# Patient Record
Sex: MALE | Race: WHITE | NOT HISPANIC OR LATINO | Employment: OTHER | URBAN - METROPOLITAN AREA
[De-identification: names, ages, dates, MRNs, and addresses within clinical notes are randomized per-mention and may not be internally consistent; named-entity substitution may affect disease eponyms.]

---

## 2018-01-25 ENCOUNTER — TELEPHONE (OUTPATIENT)
Dept: FAMILY MEDICINE CLINIC | Facility: CLINIC | Age: 48
End: 2018-01-25

## 2018-01-25 DIAGNOSIS — Z20.828 EXPOSURE TO THE FLU: Primary | ICD-10-CM

## 2018-01-25 RX ORDER — OSELTAMIVIR PHOSPHATE 75 MG/1
75 CAPSULE ORAL DAILY
Qty: 10 CAPSULE | Refills: 0 | Status: SHIPPED | OUTPATIENT
Start: 2018-01-25 | End: 2018-10-03

## 2018-01-25 NOTE — TELEPHONE ENCOUNTER
Patient is with wife today, she is positive for influenza. He is requesting Tamiflu for prevention. KRYSTIN

## 2018-10-03 ENCOUNTER — HOSPITAL ENCOUNTER (OUTPATIENT)
Dept: GENERAL RADIOLOGY | Facility: HOSPITAL | Age: 48
Discharge: HOME | End: 2018-10-03
Admitting: FAMILY MEDICINE

## 2018-10-03 ENCOUNTER — OFFICE VISIT (OUTPATIENT)
Dept: FAMILY MEDICINE CLINIC | Facility: CLINIC | Age: 48
End: 2018-10-03

## 2018-10-03 VITALS
SYSTOLIC BLOOD PRESSURE: 140 MMHG | HEART RATE: 80 BPM | DIASTOLIC BLOOD PRESSURE: 72 MMHG | TEMPERATURE: 97.7 F | WEIGHT: 315 LBS | BODY MASS INDEX: 52.48 KG/M2 | HEIGHT: 65 IN | RESPIRATION RATE: 18 BRPM

## 2018-10-03 DIAGNOSIS — R55 NEAR SYNCOPE: ICD-10-CM

## 2018-10-03 DIAGNOSIS — Z00.00 WELL ADULT EXAM: Primary | ICD-10-CM

## 2018-10-03 DIAGNOSIS — H81.13 BENIGN PAROXYSMAL POSITIONAL VERTIGO DUE TO BILATERAL VESTIBULAR DISORDER: ICD-10-CM

## 2018-10-03 DIAGNOSIS — Z12.11 SCREEN FOR COLON CANCER: ICD-10-CM

## 2018-10-03 DIAGNOSIS — R06.02 SHORTNESS OF BREATH: ICD-10-CM

## 2018-10-03 DIAGNOSIS — G47.33 OSA (OBSTRUCTIVE SLEEP APNEA): ICD-10-CM

## 2018-10-03 DIAGNOSIS — R07.89 CHEST PAIN, ATYPICAL: ICD-10-CM

## 2018-10-03 PROBLEM — M54.50 LBP (LOW BACK PAIN): Status: ACTIVE | Noted: 2018-10-03

## 2018-10-03 PROBLEM — E66.9 ADIPOSITY: Status: ACTIVE | Noted: 2018-10-03

## 2018-10-03 PROCEDURE — 99214 OFFICE O/P EST MOD 30 MIN: CPT | Performed by: FAMILY MEDICINE

## 2018-10-03 PROCEDURE — 71046 X-RAY EXAM CHEST 2 VIEWS: CPT

## 2018-10-03 RX ORDER — HYDROCODONE BITARTRATE AND ACETAMINOPHEN 10; 325 MG/1; MG/1
TABLET ORAL
Refills: 0 | COMMUNITY
Start: 2018-09-17 | End: 2021-09-21

## 2018-10-03 RX ORDER — TRAMADOL HYDROCHLORIDE 50 MG/1
50 TABLET ORAL 2 TIMES DAILY
Refills: 1 | COMMUNITY
Start: 2018-08-18 | End: 2021-09-21

## 2018-10-03 NOTE — PROGRESS NOTES
"Subjective   Matt Bueno is a 47 y.o. male.     History of Present Illness     Matt Bueno 47 y.o. male who presents for yearly preventive exam.  His overall health is: good  He exercises none.  He does see his dentist regularly  His diet is in general, an \"unhealthy\" diet, but they are going to try to make changes  He describes his alcohol intake as very rare  He is sexually active  He does not have ED or other bedroom issues      Mom had colon cancer then she was 58 and passed away at age 64  He has not had a colonsocopy    He noticed since he had a cold about a month ago that he has had increase work of breathing  Can happen at rest and with exertion  Sometimes when cough it actually makes it easier to breath  No congestion, no CP    He had an epsiode one night about a month ago  He woke up, sweaty and SOA  Felt like he was going to pass out  He has been dizzy with change in position since that time  More dizzy with change of head position    The following portions of the patient's history were reviewed and updated as appropriate: allergies, current medications, past family history, past medical history, past social history, past surgical history and problem list.    Review of Systems   Constitutional: Positive for fatigue. Negative for fever.   HENT: Positive for congestion and rhinorrhea. Negative for sinus pressure and sore throat.    Eyes: Negative.    Respiratory: Positive for shortness of breath.    Cardiovascular: Positive for chest pain.   Gastrointestinal: Negative.  Negative for constipation and diarrhea.   Endocrine: Negative.  Negative for polydipsia and polyuria.   Genitourinary: Negative.    Musculoskeletal: Negative.    Skin: Negative.    Neurological: Positive for dizziness.   Hematological: Negative.  Does not bruise/bleed easily.   Psychiatric/Behavioral: Negative.    All other systems reviewed and are negative.      Objective   Physical Exam   Constitutional: He is oriented to " person, place, and time. He appears well-developed and well-nourished. No distress.   Morbidly obese, pleasant 47 YOM, appears stated age   HENT:   Head: Normocephalic and atraumatic.   Right Ear: Tympanic membrane, external ear and ear canal normal.   Left Ear: Tympanic membrane, external ear and ear canal normal.   Nose: Nose normal.   Mouth/Throat: Uvula is midline and oropharynx is clear and moist.   Eyes: Conjunctivae and EOM are normal.   Neck: Normal range of motion. Neck supple. No thyromegaly present.   Cardiovascular: Normal rate, regular rhythm and normal heart sounds.    No murmur heard.  Pulmonary/Chest: Effort normal and breath sounds normal. No respiratory distress.   Abdominal: Soft. Bowel sounds are normal. He exhibits no distension and no mass. There is no tenderness.   Musculoskeletal: Normal range of motion. He exhibits edema (trace BLE edema).   Lymphadenopathy:     He has no cervical adenopathy.   Neurological: He is alert and oriented to person, place, and time.   Skin: Skin is warm and dry.   Psychiatric: He has a normal mood and affect. His behavior is normal. Judgment and thought content normal.   Nursing note and vitals reviewed.      Assessment/Plan   Matt was seen today for establish care.    Diagnoses and all orders for this visit:    Well adult exam    Screen for colon cancer  -     Ambulatory Referral For Screening Colonoscopy    POOJA (obstructive sleep apnea)  -     Home Sleep Study; Future    Benign paroxysmal positional vertigo due to bilateral vestibular disorder    Shortness of breath  -     XR Chest PA & Lateral    Chest pain, atypical  -     Treadmill Stress Test; Future  -     CBC & Differential  -     Comprehensive Metabolic Panel  -     Lipid Panel    Near syncope      Counseled regarding well adult issues and encouraged diet and exercise for weight loss.  Will also check colonoscopy as his mother had colon cancer.  Will check routine labs as well    wioll check home sleep  study for POOJA that he is no longer using CPAP for  CXR for SOA, stress test for chest pain, consider PFTs is everything else checks out.  Home epley maneuvers for dizziness.

## 2018-10-04 LAB
ALBUMIN SERPL-MCNC: 4.26 G/DL (ref 3.2–4.8)
ALBUMIN/GLOB SERPL: 1.9 G/DL (ref 1.5–2.5)
ALP SERPL-CCNC: 57 U/L (ref 25–100)
ALT SERPL-CCNC: 11 U/L (ref 7–40)
AST SERPL-CCNC: 12 U/L (ref 0–33)
BASOPHILS # BLD AUTO: 0.03 10*3/MM3 (ref 0–0.2)
BASOPHILS NFR BLD AUTO: 0.4 % (ref 0–1)
BILIRUB SERPL-MCNC: 0.6 MG/DL (ref 0.3–1.2)
BUN SERPL-MCNC: 14 MG/DL (ref 9–23)
BUN/CREAT SERPL: 18.7 (ref 7–25)
CALCIUM SERPL-MCNC: 9.1 MG/DL (ref 8.7–10.4)
CHLORIDE SERPL-SCNC: 102 MMOL/L (ref 99–109)
CHOLEST SERPL-MCNC: 166 MG/DL (ref 0–200)
CO2 SERPL-SCNC: 26 MMOL/L (ref 20–31)
CREAT SERPL-MCNC: 0.75 MG/DL (ref 0.6–1.3)
EOSINOPHIL # BLD AUTO: 0.31 10*3/MM3 (ref 0–0.3)
EOSINOPHIL NFR BLD AUTO: 3.9 % (ref 0–3)
ERYTHROCYTE [DISTWIDTH] IN BLOOD BY AUTOMATED COUNT: 14.1 % (ref 11.3–14.5)
GLOBULIN SER CALC-MCNC: 2.2 GM/DL
GLUCOSE SERPL-MCNC: 87 MG/DL (ref 70–100)
HCT VFR BLD AUTO: 38.8 % (ref 38.9–50.9)
HDLC SERPL-MCNC: 47 MG/DL (ref 40–60)
HGB BLD-MCNC: 12 G/DL (ref 13.1–17.5)
IMM GRANULOCYTES # BLD: 0.02 10*3/MM3 (ref 0–0.03)
IMM GRANULOCYTES NFR BLD: 0.3 % (ref 0–0.6)
LDLC SERPL CALC-MCNC: 99 MG/DL (ref 0–100)
LYMPHOCYTES # BLD AUTO: 1.99 10*3/MM3 (ref 0.6–4.8)
LYMPHOCYTES NFR BLD AUTO: 24.9 % (ref 24–44)
MCH RBC QN AUTO: 25.6 PG (ref 27–31)
MCHC RBC AUTO-ENTMCNC: 30.9 G/DL (ref 32–36)
MCV RBC AUTO: 82.9 FL (ref 80–99)
MONOCYTES # BLD AUTO: 0.63 10*3/MM3 (ref 0–1)
MONOCYTES NFR BLD AUTO: 7.9 % (ref 0–12)
NEUTROPHILS # BLD AUTO: 5.01 10*3/MM3 (ref 1.5–8.3)
NEUTROPHILS NFR BLD AUTO: 62.6 % (ref 41–71)
PLATELET # BLD AUTO: 307 10*3/MM3 (ref 150–450)
POTASSIUM SERPL-SCNC: 4.4 MMOL/L (ref 3.5–5.5)
PROT SERPL-MCNC: 6.5 G/DL (ref 5.7–8.2)
RBC # BLD AUTO: 4.68 10*6/MM3 (ref 4.2–5.76)
SODIUM SERPL-SCNC: 139 MMOL/L (ref 132–146)
TRIGL SERPL-MCNC: 101 MG/DL (ref 0–150)
VLDLC SERPL CALC-MCNC: 20.2 MG/DL
WBC # BLD AUTO: 7.99 10*3/MM3 (ref 3.5–10.8)

## 2018-10-05 ENCOUNTER — PREP FOR SURGERY (OUTPATIENT)
Dept: OTHER | Facility: HOSPITAL | Age: 48
End: 2018-10-05

## 2018-10-05 DIAGNOSIS — Z12.11 SCREEN FOR COLON CANCER: Primary | ICD-10-CM

## 2018-10-05 DIAGNOSIS — I51.7 CARDIOMEGALY: Primary | ICD-10-CM

## 2018-10-19 PROBLEM — Z12.11 SCREEN FOR COLON CANCER: Status: ACTIVE | Noted: 2018-10-19

## 2018-10-31 ENCOUNTER — HOSPITAL ENCOUNTER (OUTPATIENT)
Dept: CARDIOLOGY | Facility: HOSPITAL | Age: 48
Discharge: HOME | End: 2018-10-31
Admitting: FAMILY MEDICINE

## 2018-10-31 ENCOUNTER — TELEPHONE (OUTPATIENT)
Dept: FAMILY MEDICINE CLINIC | Facility: CLINIC | Age: 48
End: 2018-10-31

## 2018-10-31 ENCOUNTER — OFFICE VISIT (OUTPATIENT)
Dept: FAMILY MEDICINE CLINIC | Facility: CLINIC | Age: 48
End: 2018-10-31

## 2018-10-31 VITALS — WEIGHT: 315 LBS | BODY MASS INDEX: 52.48 KG/M2 | HEIGHT: 65 IN

## 2018-10-31 VITALS
WEIGHT: 315 LBS | SYSTOLIC BLOOD PRESSURE: 148 MMHG | TEMPERATURE: 98.6 F | DIASTOLIC BLOOD PRESSURE: 76 MMHG | HEIGHT: 65 IN | RESPIRATION RATE: 24 BRPM | HEART RATE: 98 BPM | BODY MASS INDEX: 52.48 KG/M2 | OXYGEN SATURATION: 97 %

## 2018-10-31 DIAGNOSIS — E66.01 MORBIDLY OBESE (HCC): ICD-10-CM

## 2018-10-31 DIAGNOSIS — R07.89 OTHER CHEST PAIN: ICD-10-CM

## 2018-10-31 DIAGNOSIS — I51.7 CARDIOMEGALY: ICD-10-CM

## 2018-10-31 DIAGNOSIS — I50.9 CONGESTIVE HEART FAILURE OF UNKNOWN ETIOLOGY (HCC): Primary | ICD-10-CM

## 2018-10-31 DIAGNOSIS — J40 BRONCHITIS: Primary | ICD-10-CM

## 2018-10-31 PROCEDURE — 93306 TTE W/DOPPLER COMPLETE: CPT

## 2018-10-31 PROCEDURE — 99213 OFFICE O/P EST LOW 20 MIN: CPT | Performed by: FAMILY MEDICINE

## 2018-10-31 PROCEDURE — 93306 TTE W/DOPPLER COMPLETE: CPT | Performed by: INTERNAL MEDICINE

## 2018-10-31 PROCEDURE — 94640 AIRWAY INHALATION TREATMENT: CPT | Performed by: FAMILY MEDICINE

## 2018-10-31 RX ORDER — CARVEDILOL 3.12 MG/1
3.12 TABLET ORAL 2 TIMES DAILY WITH MEALS
Qty: 60 TABLET | Refills: 2 | Status: ON HOLD | OUTPATIENT
Start: 2018-10-31 | End: 2018-11-16 | Stop reason: SDUPTHER

## 2018-10-31 RX ORDER — AZITHROMYCIN 250 MG/1
TABLET, FILM COATED ORAL
Qty: 6 TABLET | Refills: 0 | Status: SHIPPED | OUTPATIENT
Start: 2018-10-31 | End: 2018-11-05

## 2018-10-31 RX ORDER — ALBUTEROL SULFATE 90 UG/1
AEROSOL, METERED RESPIRATORY (INHALATION)
Qty: 1 INHALER | Refills: 5 | Status: SHIPPED | OUTPATIENT
Start: 2018-10-31 | End: 2020-09-28

## 2018-10-31 RX ORDER — PREDNISONE 20 MG/1
40 TABLET ORAL DAILY
Qty: 10 TABLET | Refills: 0 | Status: SHIPPED | OUTPATIENT
Start: 2018-10-31 | End: 2018-11-05

## 2018-10-31 NOTE — PROGRESS NOTES
Subjective   Matt Bueno is a 48 y.o. male.     History of Present Illness     He continues to have SOA and difficulty breathing  this has been worse the last 3-4 days when he had a slight cold  He tried to do a stress test this AM but he was SOA that he could not complete this  This is new for him, just stuggles to get air in, movement is poor  No CP with this, just can't get enough air in      Review of Systems   Respiratory: Positive for shortness of breath.        Objective   Physical Exam   Constitutional: He appears well-developed and well-nourished.   HENT:   Head: Normocephalic and atraumatic.   Right Ear: Hearing and external ear normal.   Left Ear: Hearing and external ear normal.   Nose: Nose normal.   Mouth/Throat: Uvula is midline, oropharynx is clear and moist and mucous membranes are normal.   Eyes: Conjunctivae and EOM are normal.   Neck: Normal range of motion.   Cardiovascular: Normal rate, regular rhythm and normal heart sounds.    Pulmonary/Chest: Effort normal. He has wheezes.   Musculoskeletal: He exhibits edema (trace BLE edema).   Lymphadenopathy:     He has no cervical adenopathy.   Psychiatric: He has a normal mood and affect. His behavior is normal.   Nursing note and vitals reviewed.      Assessment/Plan   Matt was seen today for shortness of breath.    Diagnoses and all orders for this visit:    Bronchitis    Morbidly obese (CMS/HCC)    Other orders  -     albuterol (PROVENTIL HFA;VENTOLIN HFA) 108 (90 Base) MCG/ACT inhaler; 1-2 puffs q 4-6 hours PRN  -     predniSONE (DELTASONE) 20 MG tablet; Take 2 tablets by mouth Daily. Take with food  -     azithromycin (ZITHROMAX) 250 MG tablet; Take 2 tablets the first day, then 1 tablet daily for 4 days.    there was nice improvement in his breathing after duoneb nebulizer treatment.  Will use albuterol HFA as well as pred burst and Abx.  He will call back INB in 48 hours.  Will work on stress test in the future

## 2018-11-01 LAB
BH CV ECHO MEAS - AO ROOT AREA (BSA CORRECTED): 1.2
BH CV ECHO MEAS - AO ROOT AREA: 6.8 CM^2
BH CV ECHO MEAS - AO ROOT DIAM: 2.9 CM
BH CV ECHO MEAS - BSA(HAYCOCK): 2.8 M^2
BH CV ECHO MEAS - BSA: 2.5 M^2
BH CV ECHO MEAS - BZI_BMI: 57.6 KILOGRAMS/M^2
BH CV ECHO MEAS - BZI_METRIC_HEIGHT: 165.1 CM
BH CV ECHO MEAS - BZI_METRIC_WEIGHT: 156.9 KG
BH CV ECHO MEAS - EDV(CUBED): 317.9 ML
BH CV ECHO MEAS - EDV(TEICH): 241.3 ML
BH CV ECHO MEAS - EF(CUBED): 16.1 %
BH CV ECHO MEAS - EF(TEICH): 12.4 %
BH CV ECHO MEAS - ESV(CUBED): 266.9 ML
BH CV ECHO MEAS - ESV(TEICH): 211.4 ML
BH CV ECHO MEAS - FS: 5.7 %
BH CV ECHO MEAS - IVS/LVPW: 0.92
BH CV ECHO MEAS - IVSD: 1.1 CM
BH CV ECHO MEAS - LA DIMENSION: 5.9 CM
BH CV ECHO MEAS - LA/AO: 2
BH CV ECHO MEAS - LAD MAJOR: 6 CM
BH CV ECHO MEAS - LV MASS(C)D: 368.7 GRAMS
BH CV ECHO MEAS - LV MASS(C)DI: 147.6 GRAMS/M^2
BH CV ECHO MEAS - LVIDD: 6.8 CM
BH CV ECHO MEAS - LVIDS: 6.4 CM
BH CV ECHO MEAS - LVPWD: 1.2 CM
BH CV ECHO MEAS - MV E MAX VEL: 119.4 CM/SEC
BH CV ECHO MEAS - PA ACC SLOPE: 576.9 CM/SEC^2
BH CV ECHO MEAS - PA ACC TIME: 0.09 SEC
BH CV ECHO MEAS - PA PR(ACCEL): 37.8 MMHG
BH CV ECHO MEAS - RAP SYSTOLE: 15 MMHG
BH CV ECHO MEAS - RVSP: 57 MMHG
BH CV ECHO MEAS - SI(CUBED): 20.5 ML/M^2
BH CV ECHO MEAS - SI(TEICH): 12 ML/M^2
BH CV ECHO MEAS - SV(CUBED): 51.1 ML
BH CV ECHO MEAS - SV(TEICH): 29.9 ML
BH CV ECHO MEAS - TAPSE (>1.6): 1.5 CM2
BH CV ECHO MEAS - TR MAX PG: 47 MMHG
BH CV ECHO MEAS - TR MAX VEL: 341 CM/SEC
BH CV VAS BP RIGHT ARM: NORMAL MMHG
BH CV XLRA - RV BASE: 4.3 CM
BH CV XLRA - RV LENGTH: 8.6 CM
BH CV XLRA - RV MID: 3.3 CM
LEFT ATRIUM VOLUME INDEX: 56.5 ML/M^2
LEFT ATRIUM VOLUME: 141 CM3
LV EF 2D ECHO EST: 25 %
MAXIMAL PREDICTED HEART RATE: 170 BPM
STRESS TARGET HR: 145 BPM

## 2018-11-05 ENCOUNTER — CONSULT (OUTPATIENT)
Dept: CARDIOLOGY | Facility: CLINIC | Age: 48
End: 2018-11-05

## 2018-11-05 VITALS
BODY MASS INDEX: 52.48 KG/M2 | DIASTOLIC BLOOD PRESSURE: 80 MMHG | HEIGHT: 65 IN | SYSTOLIC BLOOD PRESSURE: 124 MMHG | HEART RATE: 98 BPM | WEIGHT: 315 LBS

## 2018-11-05 DIAGNOSIS — I50.43 ACUTE ON CHRONIC COMBINED SYSTOLIC AND DIASTOLIC CONGESTIVE HEART FAILURE (HCC): ICD-10-CM

## 2018-11-05 DIAGNOSIS — I42.9 CARDIOMYOPATHY, UNSPECIFIED TYPE (HCC): Primary | ICD-10-CM

## 2018-11-05 PROCEDURE — 93000 ELECTROCARDIOGRAM COMPLETE: CPT | Performed by: INTERNAL MEDICINE

## 2018-11-05 PROCEDURE — 99214 OFFICE O/P EST MOD 30 MIN: CPT | Performed by: INTERNAL MEDICINE

## 2018-11-05 RX ORDER — FUROSEMIDE 40 MG/1
40 TABLET ORAL DAILY
Qty: 30 TABLET | Refills: 11 | Status: SHIPPED | OUTPATIENT
Start: 2018-11-05

## 2018-11-05 RX ORDER — OMEPRAZOLE 20 MG/1
20 CAPSULE, DELAYED RELEASE ORAL DAILY
COMMUNITY
End: 2020-02-03

## 2018-11-05 NOTE — H&P (VIEW-ONLY)
Subjective:     Encounter Date:11/05/2018    Primary Care Physician: Stas Rust MD      Patient ID: Matt Bueno is a 48 y.o. male.    Chief Complaint:Chest Pain; Dizziness; Shortness of Breath; and Edema    PROBLEM LIST:  1. Cardiomyopathy  a.  ECHO EF 25% with RVSP 57 mmHg. Mild MR>  2. Morbid obesity  3. Sleep apnea on CPAP  4. Incomplete LBBB  5. Surgeries:  a. Gastric sleeve  b. Left knee arthroscopy  c. Bilateral shoulder surgery      No Known Allergies      Current Outpatient Prescriptions:   •  albuterol (PROVENTIL HFA;VENTOLIN HFA) 108 (90 Base) MCG/ACT inhaler, 1-2 puffs q 4-6 hours PRN, Disp: 1 inhaler, Rfl: 5  •  carvedilol (COREG) 3.125 MG tablet, Take 1 tablet by mouth 2 (Two) Times a Day With Meals., Disp: 60 tablet, Rfl: 2  •  HYDROcodone-acetaminophen (NORCO)  MG per tablet, TAKE 1 TABLET EVERY 6 HOURS, Disp: , Rfl: 0  •  omeprazole (priLOSEC) 20 MG capsule, Take 20 mg by mouth Daily., Disp: , Rfl:   •  traMADol (ULTRAM) 50 MG tablet, Take 50 mg by mouth 2 (Two) Times a Day., Disp: , Rfl: 1        History of Present Illness    Patient is a 48-year-old  male who we are seeing today for new diagnosis of cardiomyopathy.  He has no previous history of coronary disease.  He notes that he has not been feeling well for the last 4-5 months.  Within the last 2-3 months he had an episode where he woke up in the middle the night and was diaphoretic with associated nausea and felt as if he was going to pass out.  He notes that he laid down and after about 45 minutes the symptoms resolved.  Over the course of last few months he notes significant progressive shortness of breath with exertion.  He will also experience some exertional face discomfort.  This improves with rest.  Notes that he is only able to walk from the exam room to the lobby before becoming short of breath.  No reported syncope.  He states that he has gained almost 35 pounds in the last 6 months.  Also notes an  occasional sharp chest twinge.  Complains of some bilateral lower short of the edema which does not improve with elevation of his extremities.  He had a previous history of sleep apnea.  He was retested recently and started back on CPAP within the last one to 2 weeks.  Due to his symptoms he was referred for an echocardiogram which showed decreased LVEF.  Secondary to this he is referred here for further evaluation.    The following portions of the patient's history were reviewed and updated as appropriate: allergies, current medications, past family history, past medical history, past social history, past surgical history and problem list.    Family History   Problem Relation Age of Onset   • Colon cancer Mother    • Coronary artery disease Father    • Heart disease Father        Social History   Substance Use Topics   • Smoking status: Never Smoker   • Smokeless tobacco: Never Used   • Alcohol use No      Comment: rare         Review of Systems   Constitution: Positive for malaise/fatigue and weight gain. Negative for fever and weakness.   HENT: Negative for nosebleeds.    Eyes: Positive for blurred vision. Negative for redness and visual disturbance.   Cardiovascular: Positive for chest pain, leg swelling and orthopnea. Negative for palpitations and paroxysmal nocturnal dyspnea.   Respiratory: Positive for cough, shortness of breath and wheezing. Negative for snoring and sputum production.    Hematologic/Lymphatic: Negative for bleeding problem.   Skin: Negative for flushing, itching and rash.   Musculoskeletal: Positive for muscle weakness and myalgias. Negative for falls, joint pain and muscle cramps.   Gastrointestinal: Positive for heartburn. Negative for abdominal pain, diarrhea, nausea and vomiting.   Genitourinary: Negative for hematuria.   Neurological: Positive for excessive daytime sleepiness, dizziness, headaches and vertigo. Negative for tremors.   Psychiatric/Behavioral: Negative for substance abuse.  "The patient is not nervous/anxious.           Objective:    height is 165.1 cm (65\") and weight is 160 kg (353 lb) (abnormal). His blood pressure is 124/80 and his pulse is 98.         Physical Exam   Constitutional: He is oriented to person, place, and time. He appears well-developed and well-nourished.   HENT:   Head: Normocephalic and atraumatic.   Mouth/Throat: Oropharynx is clear and moist.   Eyes: Pupils are equal, round, and reactive to light. Conjunctivae are normal.   Neck: Normal carotid pulses and no JVD present. Carotid bruit is not present. No thyromegaly present.   Cardiovascular: Normal rate, regular rhythm, S1 normal and S2 normal.  Exam reveals no gallop and no friction rub.    No murmur heard.  Pulses:       Carotid pulses are 2+ on the right side, and 2+ on the left side.       Dorsalis pedis pulses are 2+ on the right side, and 2+ on the left side.        Posterior tibial pulses are 2+ on the right side, and 2+ on the left side.   Pulmonary/Chest: No respiratory distress. He has wheezes. He has no rales. He exhibits no tenderness.   Abdominal: He exhibits no distension, no abdominal bruit and no mass. There is no hepatosplenomegaly. There is no tenderness. There is no rebound.   Musculoskeletal: He exhibits edema (Into 2+ bilateral pretibial). He exhibits no tenderness or deformity.   Lymphadenopathy:     He has no cervical adenopathy.   Neurological: He is alert and oriented to person, place, and time. He has normal strength.   Skin: Skin is warm and dry. No rash noted. No cyanosis. Nails show no clubbing.   Psychiatric: He has a normal mood and affect. Cognition and memory are normal.         ECG 12 Lead  Date/Time: 11/5/2018 5:08 PM  Performed by: MATT COTE  Authorized by: MATT COTE   Rhythm: sinus rhythm  Conduction: incomplete LBBB                  Assessment:   Assessment/Plan      Matt was seen today for chest pain, dizziness, shortness of breath and edema.    Diagnoses and " all orders for this visit:    Cardiomyopathy, unspecified type (CMS/HCC)  -     Case Request Cath Lab: Left Heart Cath    Acute on chronic combined systolic and diastolic congestive heart failure (CMS/HCC)    Other orders  -     furosemide (LASIX) 40 MG tablet; Take 1 tablet by mouth Daily.      1.  New onset cardio myopathy, LVEF 20%.  2.  Heart failure, biventricular, acute on chronic functional class III–IV  3.  Pulmonary hypertension by echocardiography, RVSP equals 60.  4.  Obstructive sleep apnea, just starting on CPAP.    1.  The encourage CPAP usage and weight loss  2.  Initiate furosemide 40 mg daily for volume overload  Re-.  Start Entresto 24/26 twice a day, samples given  4.  Left heart catheterization next week once volume status as improved to rule out ischemia  5.  Further recommendations after the above.  Had long discussion today regarding the nature, diagnostic workup, and therapies for his new cardiomyopathy.       Noy GANT scribed portions of this dictation for  Dr. Hernandez.  I, Matt Hernandez MD, personally performed the services described in this documentation as scribed by the above individual in my presence, and it is both accurate and complete    Dictated utilizing Dragon dictation

## 2018-11-05 NOTE — PROGRESS NOTES
Subjective:     Encounter Date:11/05/2018    Primary Care Physician: Stas Rust MD      Patient ID: Matt Bueno is a 48 y.o. male.    Chief Complaint:Chest Pain; Dizziness; Shortness of Breath; and Edema    PROBLEM LIST:  1. Cardiomyopathy  a.  ECHO EF 25% with RVSP 57 mmHg. Mild MR>  2. Morbid obesity  3. Sleep apnea on CPAP  4. Incomplete LBBB  5. Surgeries:  a. Gastric sleeve  b. Left knee arthroscopy  c. Bilateral shoulder surgery      No Known Allergies      Current Outpatient Prescriptions:   •  albuterol (PROVENTIL HFA;VENTOLIN HFA) 108 (90 Base) MCG/ACT inhaler, 1-2 puffs q 4-6 hours PRN, Disp: 1 inhaler, Rfl: 5  •  carvedilol (COREG) 3.125 MG tablet, Take 1 tablet by mouth 2 (Two) Times a Day With Meals., Disp: 60 tablet, Rfl: 2  •  HYDROcodone-acetaminophen (NORCO)  MG per tablet, TAKE 1 TABLET EVERY 6 HOURS, Disp: , Rfl: 0  •  omeprazole (priLOSEC) 20 MG capsule, Take 20 mg by mouth Daily., Disp: , Rfl:   •  traMADol (ULTRAM) 50 MG tablet, Take 50 mg by mouth 2 (Two) Times a Day., Disp: , Rfl: 1        History of Present Illness    Patient is a 48-year-old  male who we are seeing today for new diagnosis of cardiomyopathy.  He has no previous history of coronary disease.  He notes that he has not been feeling well for the last 4-5 months.  Within the last 2-3 months he had an episode where he woke up in the middle the night and was diaphoretic with associated nausea and felt as if he was going to pass out.  He notes that he laid down and after about 45 minutes the symptoms resolved.  Over the course of last few months he notes significant progressive shortness of breath with exertion.  He will also experience some exertional face discomfort.  This improves with rest.  Notes that he is only able to walk from the exam room to the lobby before becoming short of breath.  No reported syncope.  He states that he has gained almost 35 pounds in the last 6 months.  Also notes an  occasional sharp chest twinge.  Complains of some bilateral lower short of the edema which does not improve with elevation of his extremities.  He had a previous history of sleep apnea.  He was retested recently and started back on CPAP within the last one to 2 weeks.  Due to his symptoms he was referred for an echocardiogram which showed decreased LVEF.  Secondary to this he is referred here for further evaluation.    The following portions of the patient's history were reviewed and updated as appropriate: allergies, current medications, past family history, past medical history, past social history, past surgical history and problem list.    Family History   Problem Relation Age of Onset   • Colon cancer Mother    • Coronary artery disease Father    • Heart disease Father        Social History   Substance Use Topics   • Smoking status: Never Smoker   • Smokeless tobacco: Never Used   • Alcohol use No      Comment: rare         Review of Systems   Constitution: Positive for malaise/fatigue and weight gain. Negative for fever and weakness.   HENT: Negative for nosebleeds.    Eyes: Positive for blurred vision. Negative for redness and visual disturbance.   Cardiovascular: Positive for chest pain, leg swelling and orthopnea. Negative for palpitations and paroxysmal nocturnal dyspnea.   Respiratory: Positive for cough, shortness of breath and wheezing. Negative for snoring and sputum production.    Hematologic/Lymphatic: Negative for bleeding problem.   Skin: Negative for flushing, itching and rash.   Musculoskeletal: Positive for muscle weakness and myalgias. Negative for falls, joint pain and muscle cramps.   Gastrointestinal: Positive for heartburn. Negative for abdominal pain, diarrhea, nausea and vomiting.   Genitourinary: Negative for hematuria.   Neurological: Positive for excessive daytime sleepiness, dizziness, headaches and vertigo. Negative for tremors.   Psychiatric/Behavioral: Negative for substance abuse.  "The patient is not nervous/anxious.           Objective:    height is 165.1 cm (65\") and weight is 160 kg (353 lb) (abnormal). His blood pressure is 124/80 and his pulse is 98.         Physical Exam   Constitutional: He is oriented to person, place, and time. He appears well-developed and well-nourished.   HENT:   Head: Normocephalic and atraumatic.   Mouth/Throat: Oropharynx is clear and moist.   Eyes: Pupils are equal, round, and reactive to light. Conjunctivae are normal.   Neck: Normal carotid pulses and no JVD present. Carotid bruit is not present. No thyromegaly present.   Cardiovascular: Normal rate, regular rhythm, S1 normal and S2 normal.  Exam reveals no gallop and no friction rub.    No murmur heard.  Pulses:       Carotid pulses are 2+ on the right side, and 2+ on the left side.       Dorsalis pedis pulses are 2+ on the right side, and 2+ on the left side.        Posterior tibial pulses are 2+ on the right side, and 2+ on the left side.   Pulmonary/Chest: No respiratory distress. He has wheezes. He has no rales. He exhibits no tenderness.   Abdominal: He exhibits no distension, no abdominal bruit and no mass. There is no hepatosplenomegaly. There is no tenderness. There is no rebound.   Musculoskeletal: He exhibits edema (Into 2+ bilateral pretibial). He exhibits no tenderness or deformity.   Lymphadenopathy:     He has no cervical adenopathy.   Neurological: He is alert and oriented to person, place, and time. He has normal strength.   Skin: Skin is warm and dry. No rash noted. No cyanosis. Nails show no clubbing.   Psychiatric: He has a normal mood and affect. Cognition and memory are normal.         ECG 12 Lead  Date/Time: 11/5/2018 5:08 PM  Performed by: MATT COTE  Authorized by: MATT COTE   Rhythm: sinus rhythm  Conduction: incomplete LBBB                  Assessment:   Assessment/Plan      Matt was seen today for chest pain, dizziness, shortness of breath and edema.    Diagnoses and " all orders for this visit:    Cardiomyopathy, unspecified type (CMS/HCC)  -     Case Request Cath Lab: Left Heart Cath    Acute on chronic combined systolic and diastolic congestive heart failure (CMS/HCC)    Other orders  -     furosemide (LASIX) 40 MG tablet; Take 1 tablet by mouth Daily.      1.  New onset cardio myopathy, LVEF 20%.  2.  Heart failure, biventricular, acute on chronic functional class III-IV  3.  Pulmonary hypertension by echocardiography, RVSP equals 60.  4.  Obstructive sleep apnea, just starting on CPAP.    1.  The encourage CPAP usage and weight loss  2.  Initiate furosemide 40 mg daily for volume overload  Re-.  Start Entresto 24/26 twice a day, samples given  4.  Left heart catheterization next week once volume status as improved to rule out ischemia  5.  Further recommendations after the above.  Had long discussion today regarding the nature, diagnostic workup, and therapies for his new cardiomyopathy.       Noy GANT scribed portions of this dictation for  Dr. Hernandez.  I, Matt Hernandez MD, personally performed the services described in this documentation as scribed by the above individual in my presence, and it is both accurate and complete    Dictated utilizing Dragon dictation

## 2018-11-07 PROBLEM — I42.9 CARDIOMYOPATHY (HCC): Status: ACTIVE | Noted: 2018-11-07

## 2018-11-08 ENCOUNTER — PREP FOR SURGERY (OUTPATIENT)
Dept: OTHER | Facility: HOSPITAL | Age: 48
End: 2018-11-08

## 2018-11-08 DIAGNOSIS — I42.9 CARDIOMYOPATHY (HCC): Primary | ICD-10-CM

## 2018-11-08 RX ORDER — ASPIRIN 325 MG
325 TABLET, DELAYED RELEASE (ENTERIC COATED) ORAL DAILY
Status: CANCELLED | OUTPATIENT
Start: 2018-11-09

## 2018-11-08 RX ORDER — ACETAMINOPHEN 325 MG/1
650 TABLET ORAL EVERY 4 HOURS PRN
Status: CANCELLED | OUTPATIENT
Start: 2018-11-08

## 2018-11-08 RX ORDER — NITROGLYCERIN 0.4 MG/1
0.4 TABLET SUBLINGUAL
Status: CANCELLED | OUTPATIENT
Start: 2018-11-08

## 2018-11-08 RX ORDER — SODIUM CHLORIDE 0.9 % (FLUSH) 0.9 %
3 SYRINGE (ML) INJECTION EVERY 12 HOURS SCHEDULED
Status: CANCELLED | OUTPATIENT
Start: 2018-11-08

## 2018-11-08 RX ORDER — SODIUM CHLORIDE 9 MG/ML
1-3 INJECTION, SOLUTION INTRAVENOUS CONTINUOUS
Status: CANCELLED | OUTPATIENT
Start: 2018-11-08

## 2018-11-08 RX ORDER — ASPIRIN 325 MG
325 TABLET ORAL ONCE
Status: CANCELLED | OUTPATIENT
Start: 2018-11-08 | End: 2018-11-08

## 2018-11-08 RX ORDER — ONDANSETRON 2 MG/ML
4 INJECTION INTRAMUSCULAR; INTRAVENOUS EVERY 6 HOURS PRN
Status: CANCELLED | OUTPATIENT
Start: 2018-11-08

## 2018-11-08 RX ORDER — SODIUM CHLORIDE 0.9 % (FLUSH) 0.9 %
1-10 SYRINGE (ML) INJECTION AS NEEDED
Status: CANCELLED | OUTPATIENT
Start: 2018-11-08

## 2018-11-16 ENCOUNTER — HOSPITAL ENCOUNTER (OUTPATIENT)
Facility: HOSPITAL | Age: 48
Discharge: HOME | End: 2018-11-16
Attending: INTERNAL MEDICINE | Admitting: INTERNAL MEDICINE

## 2018-11-16 VITALS
WEIGHT: 315 LBS | TEMPERATURE: 97.5 F | RESPIRATION RATE: 18 BRPM | BODY MASS INDEX: 52.48 KG/M2 | HEIGHT: 65 IN | DIASTOLIC BLOOD PRESSURE: 86 MMHG | OXYGEN SATURATION: 97 % | HEART RATE: 104 BPM | SYSTOLIC BLOOD PRESSURE: 101 MMHG

## 2018-11-16 DIAGNOSIS — I42.9 CARDIOMYOPATHY, UNSPECIFIED TYPE (HCC): ICD-10-CM

## 2018-11-16 DIAGNOSIS — I42.9 CARDIOMYOPATHY (HCC): ICD-10-CM

## 2018-11-16 LAB
ALBUMIN SERPL-MCNC: 4.23 G/DL (ref 3.2–4.8)
ALBUMIN/GLOB SERPL: 1.6 G/DL (ref 1.5–2.5)
ALP SERPL-CCNC: 53 U/L (ref 25–100)
ALT SERPL W P-5'-P-CCNC: 16 U/L (ref 7–40)
ANION GAP SERPL CALCULATED.3IONS-SCNC: 4 MMOL/L (ref 3–11)
ARTICHOKE IGE QN: 112 MG/DL (ref 0–130)
AST SERPL-CCNC: 18 U/L (ref 0–33)
BILIRUB SERPL-MCNC: 0.5 MG/DL (ref 0.3–1.2)
BUN BLD-MCNC: 14 MG/DL (ref 9–23)
BUN/CREAT SERPL: 17.5 (ref 7–25)
CALCIUM SPEC-SCNC: 8.9 MG/DL (ref 8.7–10.4)
CHLORIDE SERPL-SCNC: 103 MMOL/L (ref 99–109)
CHOLEST SERPL-MCNC: 172 MG/DL (ref 0–200)
CO2 SERPL-SCNC: 28 MMOL/L (ref 20–31)
CREAT BLD-MCNC: 0.8 MG/DL (ref 0.6–1.3)
DEPRECATED RDW RBC AUTO: 39.8 FL (ref 37–54)
ERYTHROCYTE [DISTWIDTH] IN BLOOD BY AUTOMATED COUNT: 13.7 % (ref 11.3–14.5)
GFR SERPL CREATININE-BSD FRML MDRD: 102 ML/MIN/1.73
GLOBULIN UR ELPH-MCNC: 2.6 GM/DL
GLUCOSE BLD-MCNC: 94 MG/DL (ref 70–100)
HBA1C MFR BLD: 6 % (ref 4.8–5.6)
HCT VFR BLD AUTO: 38.7 % (ref 38.9–50.9)
HDLC SERPL-MCNC: 44 MG/DL (ref 40–60)
HGB BLD-MCNC: 12.3 G/DL (ref 13.1–17.5)
MCH RBC QN AUTO: 25.3 PG (ref 27–31)
MCHC RBC AUTO-ENTMCNC: 31.8 G/DL (ref 32–36)
MCV RBC AUTO: 79.5 FL (ref 80–99)
PLATELET # BLD AUTO: 311 10*3/MM3 (ref 150–450)
PMV BLD AUTO: 9.7 FL (ref 6–12)
POTASSIUM BLD-SCNC: 4.3 MMOL/L (ref 3.5–5.5)
PROT SERPL-MCNC: 6.8 G/DL (ref 5.7–8.2)
RBC # BLD AUTO: 4.87 10*6/MM3 (ref 4.2–5.76)
SODIUM BLD-SCNC: 135 MMOL/L (ref 132–146)
TRIGL SERPL-MCNC: 164 MG/DL (ref 0–150)
WBC NRBC COR # BLD: 9.09 10*3/MM3 (ref 3.5–10.8)

## 2018-11-16 PROCEDURE — 0 IOPAMIDOL PER 1 ML: Performed by: INTERNAL MEDICINE

## 2018-11-16 PROCEDURE — 80053 COMPREHEN METABOLIC PANEL: CPT | Performed by: NURSE PRACTITIONER

## 2018-11-16 PROCEDURE — 25010000002 HEPARIN (PORCINE) PER 1000 UNITS: Performed by: INTERNAL MEDICINE

## 2018-11-16 PROCEDURE — 80061 LIPID PANEL: CPT | Performed by: NURSE PRACTITIONER

## 2018-11-16 PROCEDURE — 36415 COLL VENOUS BLD VENIPUNCTURE: CPT

## 2018-11-16 PROCEDURE — 25010000002 FENTANYL CITRATE (PF) 100 MCG/2ML SOLUTION: Performed by: INTERNAL MEDICINE

## 2018-11-16 PROCEDURE — 85027 COMPLETE CBC AUTOMATED: CPT | Performed by: NURSE PRACTITIONER

## 2018-11-16 PROCEDURE — C1894 INTRO/SHEATH, NON-LASER: HCPCS | Performed by: INTERNAL MEDICINE

## 2018-11-16 PROCEDURE — 93458 L HRT ARTERY/VENTRICLE ANGIO: CPT | Performed by: INTERNAL MEDICINE

## 2018-11-16 PROCEDURE — 83036 HEMOGLOBIN GLYCOSYLATED A1C: CPT | Performed by: NURSE PRACTITIONER

## 2018-11-16 PROCEDURE — C1769 GUIDE WIRE: HCPCS | Performed by: INTERNAL MEDICINE

## 2018-11-16 PROCEDURE — 25010000002 MIDAZOLAM PER 1 MG: Performed by: INTERNAL MEDICINE

## 2018-11-16 RX ORDER — MIDAZOLAM HYDROCHLORIDE 1 MG/ML
INJECTION INTRAMUSCULAR; INTRAVENOUS AS NEEDED
Status: DISCONTINUED | OUTPATIENT
Start: 2018-11-16 | End: 2018-11-16 | Stop reason: HOSPADM

## 2018-11-16 RX ORDER — NITROGLYCERIN 0.4 MG/1
0.4 TABLET SUBLINGUAL
Status: DISCONTINUED | OUTPATIENT
Start: 2018-11-16 | End: 2018-11-16 | Stop reason: HOSPADM

## 2018-11-16 RX ORDER — SODIUM CHLORIDE 0.9 % (FLUSH) 0.9 %
1-10 SYRINGE (ML) INJECTION AS NEEDED
Status: DISCONTINUED | OUTPATIENT
Start: 2018-11-16 | End: 2018-11-16 | Stop reason: HOSPADM

## 2018-11-16 RX ORDER — SODIUM CHLORIDE 0.9 % (FLUSH) 0.9 %
3 SYRINGE (ML) INJECTION EVERY 12 HOURS SCHEDULED
Status: DISCONTINUED | OUTPATIENT
Start: 2018-11-16 | End: 2018-11-16 | Stop reason: HOSPADM

## 2018-11-16 RX ORDER — ASPIRIN 325 MG
325 TABLET, DELAYED RELEASE (ENTERIC COATED) ORAL DAILY
Status: DISCONTINUED | OUTPATIENT
Start: 2018-11-17 | End: 2018-11-16 | Stop reason: HOSPADM

## 2018-11-16 RX ORDER — CARVEDILOL 6.25 MG/1
6.25 TABLET ORAL 2 TIMES DAILY WITH MEALS
Qty: 60 TABLET | Refills: 11 | Status: SHIPPED | OUTPATIENT
Start: 2018-11-16 | End: 2018-12-12

## 2018-11-16 RX ORDER — ASPIRIN 325 MG
325 TABLET ORAL ONCE
Status: COMPLETED | OUTPATIENT
Start: 2018-11-16 | End: 2018-11-16

## 2018-11-16 RX ORDER — ACETAMINOPHEN 325 MG/1
650 TABLET ORAL EVERY 4 HOURS PRN
Status: DISCONTINUED | OUTPATIENT
Start: 2018-11-16 | End: 2018-11-16 | Stop reason: HOSPADM

## 2018-11-16 RX ORDER — SODIUM CHLORIDE 9 MG/ML
1-3 INJECTION, SOLUTION INTRAVENOUS CONTINUOUS
Status: DISCONTINUED | OUTPATIENT
Start: 2018-11-16 | End: 2018-11-16 | Stop reason: HOSPADM

## 2018-11-16 RX ORDER — DIPHENHYDRAMINE HYDROCHLORIDE 50 MG/ML
25 INJECTION INTRAMUSCULAR; INTRAVENOUS EVERY 6 HOURS PRN
Status: DISCONTINUED | OUTPATIENT
Start: 2018-11-16 | End: 2018-11-16 | Stop reason: HOSPADM

## 2018-11-16 RX ORDER — HYDROCODONE BITARTRATE AND ACETAMINOPHEN 5; 325 MG/1; MG/1
1 TABLET ORAL EVERY 4 HOURS PRN
Status: DISCONTINUED | OUTPATIENT
Start: 2018-11-16 | End: 2018-11-16 | Stop reason: HOSPADM

## 2018-11-16 RX ORDER — LIDOCAINE HYDROCHLORIDE 10 MG/ML
INJECTION, SOLUTION EPIDURAL; INFILTRATION; INTRACAUDAL; PERINEURAL AS NEEDED
Status: DISCONTINUED | OUTPATIENT
Start: 2018-11-16 | End: 2018-11-16 | Stop reason: HOSPADM

## 2018-11-16 RX ORDER — ONDANSETRON 2 MG/ML
4 INJECTION INTRAMUSCULAR; INTRAVENOUS EVERY 6 HOURS PRN
Status: DISCONTINUED | OUTPATIENT
Start: 2018-11-16 | End: 2018-11-16 | Stop reason: HOSPADM

## 2018-11-16 RX ORDER — FENTANYL CITRATE 50 UG/ML
INJECTION, SOLUTION INTRAMUSCULAR; INTRAVENOUS AS NEEDED
Status: DISCONTINUED | OUTPATIENT
Start: 2018-11-16 | End: 2018-11-16 | Stop reason: HOSPADM

## 2018-11-16 RX ADMIN — ASPIRIN 325 MG ORAL TABLET 325 MG: 325 PILL ORAL at 06:45

## 2018-11-16 RX ADMIN — SODIUM CHLORIDE 2.06 ML/KG/HR: 9 INJECTION, SOLUTION INTRAVENOUS at 06:45

## 2018-11-16 NOTE — INTERVAL H&P NOTE
H&P reviewed. The patient was examined and there are no changes to the H&P.     ALFREDA Sandoval

## 2018-11-19 ENCOUNTER — DOCUMENTATION (OUTPATIENT)
Dept: CARDIAC REHAB | Facility: HOSPITAL | Age: 48
End: 2018-11-19

## 2018-12-04 ENCOUNTER — DOCUMENTATION (OUTPATIENT)
Dept: CARDIAC REHAB | Facility: HOSPITAL | Age: 48
End: 2018-12-04

## 2018-12-06 ENCOUNTER — DOCUMENTATION (OUTPATIENT)
Dept: CARDIAC REHAB | Facility: HOSPITAL | Age: 48
End: 2018-12-06

## 2018-12-06 NOTE — PROGRESS NOTES
Pt. Referred for Phase II Cardiac Rehab. Staff discussed benefits of exercise, program protocol, and educational material provided. Teach back verified.  Permission granted from patient for staff to fax referral information to outlying program at this time.  Staff to fax referral info to [facility].

## 2018-12-11 NOTE — PROGRESS NOTES
Subjective:     Encounter Date:12/12/2018    Primary Care Physician: Stas Rust MD      Patient ID: Matt Bueno is a 48 y.o. male.    Chief Complaint:Cardiomyopathy      PROBLEM LIST:  1. Cardiomyopathy, nonischemic  a.  ECHO EF 25% with RVSP 57 mmHg. Mild MR>  b. 11/16/18 cardiac catheterization with normal coronary arteries. Dilated cardiomyopathy with EF of 15%.  Significantly elevated LVEDP.  2. Morbid obesity  3. Sleep apnea on CPAP  4. Incomplete LBBB  5. Surgeries:  a. Gastric sleeve  b. Left knee arthroscopy  c. Bilateral shoulder surgery        No Known Allergies      Current Outpatient Medications:   •  albuterol (PROVENTIL HFA;VENTOLIN HFA) 108 (90 Base) MCG/ACT inhaler, 1-2 puffs q 4-6 hours PRN, Disp: 1 inhaler, Rfl: 5  •  carvedilol (COREG) 6.25 MG tablet, Take 1 tablet by mouth 2 (Two) Times a Day With Meals., Disp: 60 tablet, Rfl: 11  •  furosemide (LASIX) 40 MG tablet, Take 1 tablet by mouth Daily., Disp: 30 tablet, Rfl: 11  •  HYDROcodone-acetaminophen (NORCO)  MG per tablet, TAKE 1 TABLET EVERY 6 HOURS, Disp: , Rfl: 0  •  omeprazole (priLOSEC) 20 MG capsule, Take 20 mg by mouth Daily., Disp: , Rfl:   •  sacubitril-valsartan (ENTRESTO) 49-51 MG tablet, Take 1 tablet by mouth Every 12 (Twelve) Hours., Disp: 60 tablet, Rfl: 6  •  traMADol (ULTRAM) 50 MG tablet, Take 50 mg by mouth 2 (Two) Times a Day., Disp: , Rfl: 1        History of Present Illness    Patient is a 48-year-old  male who we are seeing today for follow-up for nonischemic cardiomyopathy post cardiac catheterization which showed normal coronary arteries.  Patient notes that he has been compliant with his medications.  States that he has had some mild improvement of his dyspnea with the diuretic therapy.  His edema is overall improved.  No reported chest pain, pressure, tightness.  States that his weight has been maintaining.  He is wearing his LifeVest.  He notes that this is alerted couple of times at  "night.    The following portions of the patient's history were reviewed and updated as appropriate: allergies, current medications, past family history, past medical history, past social history, past surgical history and problem list.      Social History     Tobacco Use   • Smoking status: Never Smoker   • Smokeless tobacco: Never Used   Substance Use Topics   • Alcohol use: No     Comment: rare   • Drug use: No         Review of Systems   Constitution: Positive for malaise/fatigue.   Eyes: Positive for blurred vision.   Cardiovascular: Positive for dyspnea on exertion.   Respiratory: Positive for shortness of breath.    Hematologic/Lymphatic: Negative for bleeding problem. Does not bruise/bleed easily.   Skin: Negative for rash.   Musculoskeletal: Positive for back pain, joint pain, myalgias and neck pain.   Gastrointestinal: Negative for heartburn, nausea and vomiting.   Neurological: Positive for excessive daytime sleepiness and dizziness.          Objective:    height is 165.1 cm (65\") and weight is 162 kg (358 lb) (abnormal). His blood pressure is 126/80 and his pulse is 77.         Physical Exam   Constitutional: He is oriented to person, place, and time. He appears well-developed and well-nourished. No distress.   Neck: No JVD present. No tracheal deviation present.   Cardiovascular: Normal rate, regular rhythm, normal heart sounds and intact distal pulses. Exam reveals no friction rub.   No murmur heard.  Pulmonary/Chest: Effort normal and breath sounds normal. No respiratory distress.   Abdominal: Soft. Bowel sounds are normal. There is no tenderness.   Musculoskeletal: He exhibits no edema or deformity.   Neurological: He is alert and oriented to person, place, and time.   Skin: Skin is warm and dry.       Procedures          Assessment:   Assessment/Plan      Matt was seen today for cardiomyopathy.    Diagnoses and all orders for this visit:    Nonischemic cardiomyopathy (CMS/HCC)    Morbidly obese " (CMS/[facility])      1. Nonischemic cardiomyopathy, currently appears euvolemic.  Currently on Entresto and Coreg.  2. Morbid obesity, encouraged weight loss.    Plan:  1. Increase Coreg to 12.5 mg twice daily.  2. Continue diuretics for systolic dysfunction.  3. We'll plan for repeat echocardiogram 90 days from institution of medical therapy for his cardiomyopathy.  4. Follow-up in one month's time.  If still room for medication titration will increase Entresto at that time.    Dictation was scribed for Dr. Matt Hernandez by Noy GANT I, Matt Hernandez MD, personally performed the services described in this documentation as scribed by the above individual in my presence, and it is both accurate and complete      Dictated utilizing Dragon dictation

## 2018-12-12 ENCOUNTER — OFFICE VISIT (OUTPATIENT)
Dept: CARDIOLOGY | Facility: CLINIC | Age: 48
End: 2018-12-12

## 2018-12-12 VITALS
SYSTOLIC BLOOD PRESSURE: 126 MMHG | HEART RATE: 77 BPM | HEIGHT: 65 IN | BODY MASS INDEX: 52.48 KG/M2 | DIASTOLIC BLOOD PRESSURE: 80 MMHG | WEIGHT: 315 LBS

## 2018-12-12 DIAGNOSIS — E66.01 MORBIDLY OBESE (HCC): ICD-10-CM

## 2018-12-12 DIAGNOSIS — I42.8 NONISCHEMIC CARDIOMYOPATHY (HCC): Primary | ICD-10-CM

## 2018-12-12 PROCEDURE — 99213 OFFICE O/P EST LOW 20 MIN: CPT | Performed by: INTERNAL MEDICINE

## 2018-12-12 RX ORDER — CARVEDILOL 12.5 MG/1
12.5 TABLET ORAL 2 TIMES DAILY WITH MEALS
Qty: 60 TABLET | Refills: 11 | Status: SHIPPED | OUTPATIENT
Start: 2018-12-12 | End: 2019-03-26

## 2019-01-15 NOTE — PROGRESS NOTES
Subjective:     Encounter Date:01/16/2019    Primary Care Physician: Stas Rust MD      Patient ID: Matt Bueno is a 48 y.o. male.    Chief Complaint:Cardiomyopathy    PROBLEM LIST:  1. Cardiomyopathy, nonischemic  a.  ECHO EF 25% with RVSP 57 mmHg. Mild MR>  b. 11/16/18 cardiac catheterization with normal coronary arteries. Dilated cardiomyopathy with EF of 15%.  Significantly elevated LVEDP.  2. Morbid obesity  3. Sleep apnea on CPAP  4. Incomplete LBBB  5. Surgeries:  a. Gastric sleeve  b. Left knee arthroscopy  c. Bilateral shoulder surgery      No Known Allergies      Current Outpatient Medications:   •  albuterol (PROVENTIL HFA;VENTOLIN HFA) 108 (90 Base) MCG/ACT inhaler, 1-2 puffs q 4-6 hours PRN, Disp: 1 inhaler, Rfl: 5  •  carvedilol (COREG) 12.5 MG tablet, Take 1 tablet by mouth 2 (Two) Times a Day With Meals., Disp: 60 tablet, Rfl: 11  •  furosemide (LASIX) 40 MG tablet, Take 1 tablet by mouth Daily., Disp: 30 tablet, Rfl: 11  •  HYDROcodone-acetaminophen (NORCO)  MG per tablet, TAKE 1 TABLET EVERY 6 HOURS, Disp: , Rfl: 0  •  omeprazole (priLOSEC) 20 MG capsule, Take 20 mg by mouth Daily., Disp: , Rfl:   •  sacubitril-valsartan (ENTRESTO) 49-51 MG tablet, Take 1 tablet by mouth Every 12 (Twelve) Hours., Disp: 60 tablet, Rfl: 6  •  traMADol (ULTRAM) 50 MG tablet, Take 50 mg by mouth 2 (Two) Times a Day., Disp: , Rfl: 1        History of Present Illness    Patient returns today for follow-up of his nonischemic cardio myopathy.  Since her last visit, and he is unchanged.  He notes he sometimes forgets his furosemide dosage notes no edema.  Does not have orthopnea but does wear his CPAP every evening.  Overall feels unchanged at his functional class II-III level.    The following portions of the patient's history were reviewed and updated as appropriate: allergies, current medications, past family history, past medical history, past social history, past surgical history and problem  "list.      Social History     Tobacco Use   • Smoking status: Never Smoker   • Smokeless tobacco: Never Used   Substance Use Topics   • Alcohol use: No     Comment: rare   • Drug use: No         Review of Systems   Constitution: Positive for malaise/fatigue and weight gain.   Eyes: Positive for blurred vision.   Cardiovascular: Positive for dyspnea on exertion.   Respiratory: Positive for shortness of breath.    Hematologic/Lymphatic: Negative for bleeding problem. Does not bruise/bleed easily.   Skin: Negative for rash.   Musculoskeletal: Positive for back pain, joint pain, myalgias and neck pain.   Gastrointestinal: Negative for heartburn, nausea and vomiting.   Neurological: Positive for excessive daytime sleepiness and dizziness.          Objective:    height is 165.1 cm (65\") and weight is 165 kg (363 lb) (abnormal). His blood pressure is 124/82 and his pulse is 68.         Physical Exam   Constitutional: He is oriented to person, place, and time. He appears well-developed and well-nourished.   HENT:   Mouth/Throat: Oropharynx is clear and moist.   Neck: No JVD present. Carotid bruit is not present. No thyromegaly present.   Cardiovascular: Regular rhythm, S1 normal, S2 normal, normal heart sounds and intact distal pulses. Exam reveals no gallop, no S3 and no S4.   No murmur heard.  Pulses:       Carotid pulses are 2+ on the right side, and 2+ on the left side.       Radial pulses are 2+ on the right side, and 2+ on the left side.   Pulmonary/Chest: Breath sounds normal.   Abdominal: Soft. Bowel sounds are normal. He exhibits no mass. There is no tenderness.   Musculoskeletal: He exhibits edema (Trace to 1+ bilateral).   Neurological: He is alert and oriented to person, place, and time.   Skin: Skin is warm and dry. No rash noted.       Procedures          Assessment:   Assessment/Plan      Matt was seen today for cardiomyopathy.    Diagnoses and all orders for this visit:    Dilated cardiomyopathy " (CMS/[facility])    Morbidly obese (CMS/[facility])    Essential hypertension      1.  Nonischemic cardiomyopathy.  In the process of uptitrating medications.  Wearing LifeVest.  2.  Morbid obesity  3.  Obstructive sleep apnea  4.  Hypertension improved    Recommendations: We'll increase his Entresto to 88/102 today.  Will revisit in one month with an echocardiogram to assess the need for CAD, and EKG to see if a biventricular pacer would also be of benefit    Matt Hernandez MD           Dictated utilizing Dragon dictation

## 2019-01-16 ENCOUNTER — OFFICE VISIT (OUTPATIENT)
Dept: CARDIOLOGY | Facility: CLINIC | Age: 49
End: 2019-01-16

## 2019-01-16 VITALS
DIASTOLIC BLOOD PRESSURE: 82 MMHG | HEIGHT: 65 IN | WEIGHT: 315 LBS | HEART RATE: 68 BPM | SYSTOLIC BLOOD PRESSURE: 124 MMHG | BODY MASS INDEX: 52.48 KG/M2

## 2019-01-16 DIAGNOSIS — I42.0 DILATED CARDIOMYOPATHY (HCC): Primary | ICD-10-CM

## 2019-01-16 DIAGNOSIS — E66.01 MORBIDLY OBESE (HCC): ICD-10-CM

## 2019-01-16 DIAGNOSIS — I10 ESSENTIAL HYPERTENSION: ICD-10-CM

## 2019-01-16 PROCEDURE — 99214 OFFICE O/P EST MOD 30 MIN: CPT | Performed by: INTERNAL MEDICINE

## 2019-01-22 ENCOUNTER — OFFICE VISIT (OUTPATIENT)
Dept: FAMILY MEDICINE CLINIC | Facility: CLINIC | Age: 49
End: 2019-01-22

## 2019-01-22 VITALS
HEART RATE: 74 BPM | SYSTOLIC BLOOD PRESSURE: 118 MMHG | RESPIRATION RATE: 18 BRPM | BODY MASS INDEX: 52.48 KG/M2 | DIASTOLIC BLOOD PRESSURE: 82 MMHG | TEMPERATURE: 98.4 F | HEIGHT: 65 IN | WEIGHT: 315 LBS

## 2019-01-22 DIAGNOSIS — Z99.89 OSA ON CPAP: Primary | ICD-10-CM

## 2019-01-22 DIAGNOSIS — G47.33 OSA ON CPAP: Primary | ICD-10-CM

## 2019-01-22 PROCEDURE — 99213 OFFICE O/P EST LOW 20 MIN: CPT | Performed by: FAMILY MEDICINE

## 2019-01-22 NOTE — PROGRESS NOTES
Subjective   Matt Bueno is a 48 y.o. male.     History of Present Illness     He has been using his CPAP for the last 3 months and he does feel like it is helping  He does feel better during the day after using the CPAP machine  No issues with the mask at all  He is using it every night    The following portions of the patient's history were reviewed and updated as appropriate: allergies, current medications, past family history, past medical history, past social history, past surgical history and problem list.    Review of Systems   Constitutional: Negative.        Objective   Physical Exam   Constitutional: He appears well-developed and well-nourished. No distress.   Cardiovascular: Normal rate, regular rhythm and normal heart sounds.   Pulmonary/Chest: Effort normal and breath sounds normal.   Psychiatric: He has a normal mood and affect. His behavior is normal.   Nursing note and vitals reviewed.      Assessment/Plan   Matt was seen today for follow-up.    Diagnoses and all orders for this visit:    POOJA on CPAP    he continues to use his CPAP machine and has felt positive improvement with the device  No complaints and will continue the CPAP machine

## 2019-02-20 ENCOUNTER — HOSPITAL ENCOUNTER (OUTPATIENT)
Dept: CARDIOLOGY | Facility: HOSPITAL | Age: 49
Discharge: HOME | End: 2019-02-20
Attending: INTERNAL MEDICINE | Admitting: INTERNAL MEDICINE

## 2019-02-20 VITALS — BODY MASS INDEX: 52.48 KG/M2 | WEIGHT: 315 LBS | HEIGHT: 65 IN

## 2019-02-20 DIAGNOSIS — E66.01 MORBIDLY OBESE (HCC): ICD-10-CM

## 2019-02-20 DIAGNOSIS — I10 ESSENTIAL HYPERTENSION: ICD-10-CM

## 2019-02-20 DIAGNOSIS — I42.0 DILATED CARDIOMYOPATHY (HCC): ICD-10-CM

## 2019-02-20 PROCEDURE — 93306 TTE W/DOPPLER COMPLETE: CPT

## 2019-02-20 PROCEDURE — 93306 TTE W/DOPPLER COMPLETE: CPT | Performed by: INTERNAL MEDICINE

## 2019-02-21 LAB
BH CV ECHO MEAS - AO MAX PG (FULL): 3.1 MMHG
BH CV ECHO MEAS - AO MAX PG: 5 MMHG
BH CV ECHO MEAS - AO MEAN PG (FULL): 2.1 MMHG
BH CV ECHO MEAS - AO MEAN PG: 3.1 MMHG
BH CV ECHO MEAS - AO ROOT AREA (BSA CORRECTED): 1.1
BH CV ECHO MEAS - AO ROOT AREA: 6 CM^2
BH CV ECHO MEAS - AO ROOT DIAM: 2.8 CM
BH CV ECHO MEAS - AO V2 MAX: 112.2 CM/SEC
BH CV ECHO MEAS - AO V2 MEAN: 83.7 CM/SEC
BH CV ECHO MEAS - AO V2 VTI: 21.8 CM
BH CV ECHO MEAS - AVA(I,A): 2.2 CM^2
BH CV ECHO MEAS - AVA(I,D): 2.2 CM^2
BH CV ECHO MEAS - AVA(V,A): 2.1 CM^2
BH CV ECHO MEAS - AVA(V,D): 2.1 CM^2
BH CV ECHO MEAS - BSA(HAYCOCK): 2.9 M^2
BH CV ECHO MEAS - BSA: 2.5 M^2
BH CV ECHO MEAS - BZI_BMI: 60.1 KILOGRAMS/M^2
BH CV ECHO MEAS - BZI_METRIC_HEIGHT: 165.1 CM
BH CV ECHO MEAS - BZI_METRIC_WEIGHT: 163.8 KG
BH CV ECHO MEAS - EDV(CUBED): 303.2 ML
BH CV ECHO MEAS - EDV(TEICH): 232.7 ML
BH CV ECHO MEAS - EF(CUBED): 27.9 %
BH CV ECHO MEAS - EF(TEICH): 21.9 %
BH CV ECHO MEAS - ESV(CUBED): 218.7 ML
BH CV ECHO MEAS - ESV(TEICH): 181.7 ML
BH CV ECHO MEAS - FS: 10.3 %
BH CV ECHO MEAS - IVS/LVPW: 1.1
BH CV ECHO MEAS - IVSD: 1.1 CM
BH CV ECHO MEAS - LA DIMENSION: 5.7 CM
BH CV ECHO MEAS - LA/AO: 2.1
BH CV ECHO MEAS - LAD MAJOR: 7.2 CM
BH CV ECHO MEAS - LAT PEAK E' VEL: 9.1 CM/SEC
BH CV ECHO MEAS - LATERAL E/E' RATIO: 11.6
BH CV ECHO MEAS - LV MASS(C)D: 314.8 GRAMS
BH CV ECHO MEAS - LV MASS(C)DI: 123.8 GRAMS/M^2
BH CV ECHO MEAS - LV MAX PG: 1.9 MMHG
BH CV ECHO MEAS - LV MEAN PG: 1 MMHG
BH CV ECHO MEAS - LV V1 MAX: 69.1 CM/SEC
BH CV ECHO MEAS - LV V1 MEAN: 47.2 CM/SEC
BH CV ECHO MEAS - LV V1 VTI: 14.1 CM
BH CV ECHO MEAS - LVIDD: 6.7 CM
BH CV ECHO MEAS - LVIDS: 6 CM
BH CV ECHO MEAS - LVOT AREA (M): 3.5 CM^2
BH CV ECHO MEAS - LVOT AREA: 3.4 CM^2
BH CV ECHO MEAS - LVOT DIAM: 2.1 CM
BH CV ECHO MEAS - LVPWD: 0.99 CM
BH CV ECHO MEAS - MED PEAK E' VEL: 5.7 CM/SEC
BH CV ECHO MEAS - MEDIAL E/E' RATIO: 18.4
BH CV ECHO MEAS - MV A MAX VEL: 74 CM/SEC
BH CV ECHO MEAS - MV DEC TIME: 0.17 SEC
BH CV ECHO MEAS - MV E MAX VEL: 106.6 CM/SEC
BH CV ECHO MEAS - MV E/A: 1.4
BH CV ECHO MEAS - PA ACC SLOPE: 1058 CM/SEC^2
BH CV ECHO MEAS - PA ACC TIME: 0.08 SEC
BH CV ECHO MEAS - PA MAX PG: 3 MMHG
BH CV ECHO MEAS - PA PR(ACCEL): 44.1 MMHG
BH CV ECHO MEAS - PA V2 MAX: 86.9 CM/SEC
BH CV ECHO MEAS - SI(AO): 51.8 ML/M^2
BH CV ECHO MEAS - SI(CUBED): 33.2 ML/M^2
BH CV ECHO MEAS - SI(LVOT): 18.7 ML/M^2
BH CV ECHO MEAS - SI(TEICH): 20.1 ML/M^2
BH CV ECHO MEAS - SV(AO): 131.7 ML
BH CV ECHO MEAS - SV(CUBED): 84.5 ML
BH CV ECHO MEAS - SV(LVOT): 47.5 ML
BH CV ECHO MEAS - SV(TEICH): 51 ML
BH CV ECHO MEASUREMENTS AVERAGE E/E' RATIO: 14.41
BH CV VAS BP RIGHT ARM: NORMAL MMHG
LEFT ATRIUM VOLUME INDEX: 42.9 ML/M^2
LEFT ATRIUM VOLUME: 109 ML
LV EF 2D ECHO EST: 25 %

## 2019-02-22 ENCOUNTER — TELEPHONE (OUTPATIENT)
Dept: CARDIOLOGY | Facility: CLINIC | Age: 49
End: 2019-02-22

## 2019-02-22 NOTE — TELEPHONE ENCOUNTER
Spoke with patient, advised needs ICD plus/minus biventricular pacemaker.  [facility]  Ok per him to place order for referral.

## 2019-02-22 NOTE — TELEPHONE ENCOUNTER
----- Message from Matt Hernandez MD sent at 2/22/2019 11:52 AM EST -----  Needs ICD, plus minus biventricular pacemaker.

## 2019-02-26 NOTE — PROGRESS NOTES
Subjective:     Encounter Date:02/27/2019    Primary Care Physician: Stas Rust MD      Patient ID: Matt Bueno is a 48 y.o. male.    Chief Complaint:Cardiomyopathy    PROBLEM LIST:  1. Cardiomyopathy, nonischemic  a.  ECHO EF 25% with RVSP 57 mmHg. Mild MR>  b. 11/16/18 cardiac catheterization with normal coronary arteries. Dilated cardiomyopathy with EF of 15%.  Significantly elevated LVEDP.  c. 2/20/19 echo EF 25%. Mild to moderate MR.  2. Morbid obesity  3. Sleep apnea on CPAP  4. Incomplete LBBB  5. Surgeries:  a. Gastric sleeve  b. Left knee arthroscopy  c. Bilateral shoulder surgery      No Known Allergies      Current Outpatient Medications:   •  albuterol (PROVENTIL HFA;VENTOLIN HFA) 108 (90 Base) MCG/ACT inhaler, 1-2 puffs q 4-6 hours PRN, Disp: 1 inhaler, Rfl: 5  •  carvedilol (COREG) 12.5 MG tablet, Take 1 tablet by mouth 2 (Two) Times a Day With Meals., Disp: 60 tablet, Rfl: 11  •  furosemide (LASIX) 40 MG tablet, Take 1 tablet by mouth Daily., Disp: 30 tablet, Rfl: 11  •  HYDROcodone-acetaminophen (NORCO)  MG per tablet, TAKE 1 TABLET EVERY 6 HOURS, Disp: , Rfl: 0  •  omeprazole (priLOSEC) 20 MG capsule, Take 20 mg by mouth Daily., Disp: , Rfl:   •  sacubitril-valsartan (ENTRESTO)  MG tablet, Take 1 tablet by mouth 2 (Two) Times a Day., Disp: 60 tablet, Rfl: 11  •  traMADol (ULTRAM) 50 MG tablet, Take 50 mg by mouth 2 (Two) Times a Day., Disp: , Rfl: 1        History of Present Illness    Returns today for follow-up of nonischemic cardiomyopathy.  Since her last visit an echocardiogram showed his LVEF is improved only to 25%.  From a symptom standpoint, no significant change since her last visit.  Still some occasional shortness of breath and orthopnea.  He has some trouble with the cost of his medications, as it issues with the cost of his LifeVest.  He is concerned about the cost of an upcoming defibrillator which is here discussed today.    The following portions of  "the patient's history were reviewed and updated as appropriate: allergies, current medications, past family history, past medical history, past social history, past surgical history and problem list.      Social History     Tobacco Use   • Smoking status: Never Smoker   • Smokeless tobacco: Never Used   Substance Use Topics   • Alcohol use: Yes     Alcohol/week: 0.6 oz     Types: 1 Cans of beer per week     Comment: twice a year   • Drug use: No         Review of Systems   Constitution: Positive for weight gain.   Eyes: Positive for blurred vision.   Cardiovascular: Positive for dyspnea on exertion and leg swelling.   Respiratory: Positive for shortness of breath.    Hematologic/Lymphatic: Negative for bleeding problem. Does not bruise/bleed easily.   Skin: Negative for rash.   Musculoskeletal: Positive for back pain, myalgias and neck pain.   Gastrointestinal: Negative for heartburn, nausea and vomiting.   Neurological: Positive for excessive daytime sleepiness and dizziness.          Objective:    height is 165.1 cm (65\") and weight is 162 kg (358 lb) (abnormal). His blood pressure is 136/82 and his pulse is 72.         Physical Exam   Constitutional: He is oriented to person, place, and time. He appears well-developed and well-nourished.   HENT:   Mouth/Throat: Oropharynx is clear and moist.   Neck: No JVD present. Carotid bruit is not present. No thyromegaly present.   Cardiovascular: Regular rhythm, S1 normal, S2 normal, normal heart sounds and intact distal pulses. Exam reveals no gallop, no S3 and no S4.   No murmur heard.  Pulses:       Carotid pulses are 2+ on the right side, and 2+ on the left side.       Radial pulses are 2+ on the right side, and 2+ on the left side.   Pulmonary/Chest: Breath sounds normal.   Abdominal: Soft. Bowel sounds are normal. He exhibits no mass. There is no tenderness.   Musculoskeletal: He exhibits no edema.   Neurological: He is alert and oriented to person, place, and time. "   Skin: Skin is warm and dry. No rash noted.       Procedures          Assessment:   Assessment/Plan      Matt was seen today for cardiomyopathy.    Diagnoses and all orders for this visit:    Dilated cardiomyopathy (CMS/HCC)    Morbidly obese (CMS/HCC)      1.  Nonischemic cardia myopathy.  90 days post medical therapy with LVEF still of 25%.  Chronic functional class II dyspnea.    Weight loss at length once again with patient.  Discussed ICD.  Patient is willing to proceed, but has issues with cost concerns.  Will have this addressed through electrophysiology.  But if the cost issues can be sorted out, he is willing to proceed.  ( he is not wearing a LifeVest as he sent back due to the high co-pay)     Matt Hernandez MD    Dictated utilizing Dragon dictation

## 2019-02-27 ENCOUNTER — OFFICE VISIT (OUTPATIENT)
Dept: CARDIOLOGY | Facility: CLINIC | Age: 49
End: 2019-02-27

## 2019-02-27 VITALS
WEIGHT: 315 LBS | HEART RATE: 72 BPM | DIASTOLIC BLOOD PRESSURE: 82 MMHG | HEIGHT: 65 IN | BODY MASS INDEX: 52.48 KG/M2 | SYSTOLIC BLOOD PRESSURE: 136 MMHG

## 2019-02-27 DIAGNOSIS — E66.01 MORBIDLY OBESE (HCC): ICD-10-CM

## 2019-02-27 DIAGNOSIS — I42.0 DILATED CARDIOMYOPATHY (HCC): Primary | ICD-10-CM

## 2019-02-27 PROCEDURE — 99213 OFFICE O/P EST LOW 20 MIN: CPT | Performed by: INTERNAL MEDICINE

## 2019-03-11 DIAGNOSIS — I42.8 NON-ISCHEMIC CARDIOMYOPATHY (HCC): Primary | ICD-10-CM

## 2019-03-26 ENCOUNTER — CONSULT (OUTPATIENT)
Dept: CARDIOLOGY | Facility: CLINIC | Age: 49
End: 2019-03-26

## 2019-03-26 ENCOUNTER — APPOINTMENT (OUTPATIENT)
Dept: PREADMISSION TESTING | Facility: HOSPITAL | Age: 49
End: 2019-03-26

## 2019-03-26 ENCOUNTER — PREP FOR SURGERY (OUTPATIENT)
Dept: OTHER | Facility: HOSPITAL | Age: 49
End: 2019-03-26

## 2019-03-26 VITALS
HEIGHT: 65 IN | WEIGHT: 315 LBS | HEART RATE: 71 BPM | DIASTOLIC BLOOD PRESSURE: 76 MMHG | SYSTOLIC BLOOD PRESSURE: 132 MMHG | BODY MASS INDEX: 52.48 KG/M2

## 2019-03-26 DIAGNOSIS — I50.22 CHRONIC SYSTOLIC HEART FAILURE (HCC): ICD-10-CM

## 2019-03-26 DIAGNOSIS — I50.22 CHRONIC SYSTOLIC CONGESTIVE HEART FAILURE (HCC): ICD-10-CM

## 2019-03-26 DIAGNOSIS — I42.0 DILATED CARDIOMYOPATHY (HCC): Primary | ICD-10-CM

## 2019-03-26 DIAGNOSIS — I50.22 CHRONIC SYSTOLIC HEART FAILURE (HCC): Primary | ICD-10-CM

## 2019-03-26 LAB
ALBUMIN SERPL-MCNC: 4.14 G/DL (ref 3.2–4.8)
ALBUMIN/GLOB SERPL: 1.8 G/DL (ref 1.5–2.5)
ALP SERPL-CCNC: 66 U/L (ref 25–100)
ALT SERPL W P-5'-P-CCNC: 14 U/L (ref 7–40)
ANION GAP SERPL CALCULATED.3IONS-SCNC: 7 MMOL/L (ref 3–11)
AST SERPL-CCNC: 14 U/L (ref 0–33)
BASOPHILS # BLD AUTO: 0.03 10*3/MM3 (ref 0–0.2)
BASOPHILS NFR BLD AUTO: 0.5 % (ref 0–1)
BILIRUB SERPL-MCNC: 0.4 MG/DL (ref 0.3–1.2)
BUN BLD-MCNC: 14 MG/DL (ref 9–23)
BUN/CREAT SERPL: 19.7 (ref 7–25)
CALCIUM SPEC-SCNC: 9 MG/DL (ref 8.7–10.4)
CHLORIDE SERPL-SCNC: 104 MMOL/L (ref 99–109)
CO2 SERPL-SCNC: 28 MMOL/L (ref 20–31)
CREAT BLD-MCNC: 0.71 MG/DL (ref 0.6–1.3)
DEPRECATED RDW RBC AUTO: 41.2 FL (ref 37–54)
EOSINOPHIL # BLD AUTO: 0.31 10*3/MM3 (ref 0–0.3)
EOSINOPHIL NFR BLD AUTO: 4.7 % (ref 0–3)
ERYTHROCYTE [DISTWIDTH] IN BLOOD BY AUTOMATED COUNT: 13.7 % (ref 11.3–14.5)
GFR SERPL CREATININE-BSD FRML MDRD: 117 ML/MIN/1.73
GLOBULIN UR ELPH-MCNC: 2.3 GM/DL
GLUCOSE BLD-MCNC: 101 MG/DL (ref 70–100)
HCT VFR BLD AUTO: 40.9 % (ref 38.9–50.9)
HGB BLD-MCNC: 13.3 G/DL (ref 13.1–17.5)
IMM GRANULOCYTES # BLD AUTO: 0.01 10*3/MM3 (ref 0–0.05)
IMM GRANULOCYTES NFR BLD AUTO: 0.2 % (ref 0–0.6)
LYMPHOCYTES # BLD AUTO: 1.74 10*3/MM3 (ref 0.6–4.8)
LYMPHOCYTES NFR BLD AUTO: 26.6 % (ref 24–44)
MCH RBC QN AUTO: 26.7 PG (ref 27–31)
MCHC RBC AUTO-ENTMCNC: 32.5 G/DL (ref 32–36)
MCV RBC AUTO: 82 FL (ref 80–99)
MONOCYTES # BLD AUTO: 0.49 10*3/MM3 (ref 0–1)
MONOCYTES NFR BLD AUTO: 7.5 % (ref 0–12)
NEUTROPHILS # BLD AUTO: 3.97 10*3/MM3 (ref 1.5–8.3)
NEUTROPHILS NFR BLD AUTO: 60.7 % (ref 41–71)
PLATELET # BLD AUTO: 264 10*3/MM3 (ref 150–450)
PMV BLD AUTO: 10.2 FL (ref 6–12)
POTASSIUM BLD-SCNC: 4.5 MMOL/L (ref 3.5–5.5)
PROT SERPL-MCNC: 6.4 G/DL (ref 5.7–8.2)
RBC # BLD AUTO: 4.99 10*6/MM3 (ref 4.2–5.76)
SODIUM BLD-SCNC: 139 MMOL/L (ref 132–146)
WBC NRBC COR # BLD: 6.54 10*3/MM3 (ref 3.5–10.8)

## 2019-03-26 PROCEDURE — 36415 COLL VENOUS BLD VENIPUNCTURE: CPT

## 2019-03-26 PROCEDURE — 80053 COMPREHEN METABOLIC PANEL: CPT | Performed by: PHYSICIAN ASSISTANT

## 2019-03-26 PROCEDURE — 93000 ELECTROCARDIOGRAM COMPLETE: CPT | Performed by: INTERNAL MEDICINE

## 2019-03-26 PROCEDURE — 99204 OFFICE O/P NEW MOD 45 MIN: CPT | Performed by: INTERNAL MEDICINE

## 2019-03-26 PROCEDURE — 85025 COMPLETE CBC W/AUTO DIFF WBC: CPT | Performed by: PHYSICIAN ASSISTANT

## 2019-03-26 RX ORDER — SODIUM CHLORIDE 0.9 % (FLUSH) 0.9 %
3-10 SYRINGE (ML) INJECTION AS NEEDED
Status: CANCELLED | OUTPATIENT
Start: 2019-03-26

## 2019-03-26 RX ORDER — SODIUM CHLORIDE 0.9 % (FLUSH) 0.9 %
3 SYRINGE (ML) INJECTION EVERY 12 HOURS SCHEDULED
Status: CANCELLED | OUTPATIENT
Start: 2019-03-26

## 2019-03-26 RX ORDER — CARVEDILOL 25 MG/1
12.5 TABLET ORAL 2 TIMES DAILY WITH MEALS
COMMUNITY
End: 2020-02-03

## 2019-03-26 RX ORDER — CEFAZOLIN SODIUM 2 G/100ML
2 INJECTION, SOLUTION INTRAVENOUS ONCE
Status: CANCELLED | OUTPATIENT
Start: 2019-03-26

## 2019-03-26 NOTE — DISCHARGE INSTRUCTIONS

## 2019-03-26 NOTE — PROGRESS NOTES
Matt Bueno  1970  PCP: Stas Rust MD    SUBJECTIVE:   Matt Bueno is a 48 y.o. male seen for a consultation visit regarding the following:     Chief Complaint:   Chief Complaint   Patient presents with   • Cardiomyopathy          Consultation is requested by Matt Hernandez MD for evaluation of Cardiomyopathy        History:  Patient is a 48 year old male with a history of morbid obesity and chronic systolic heart failure that presents today for evaluation for ICD. His EF was initially found to be low in Oct 2018. He then underwent LHC that showed normal coronaries. He was placed on optimal rx and echo was rechecked in Feb 2019 with no improvement and it remained at 25%. He is symptomatic with fatigue and WONG when he climbs stairs. No CP, edema, palpitations.       Cardiac PMH: (Old records have been reviewed and summarized below)    PROBLEM LIST:  1. Cardiomyopathy, nonischemic  a.  ECHO EF 25% with RVSP 57 mmHg. Mild MR>  b. 11/16/18 cardiac catheterization with normal coronary arteries. Dilated cardiomyopathy with EF of 15%.  Significantly elevated LVEDP.  c. 2/20/19 echo EF 25%. Mild to moderate MR.  2. Morbid obesity - Peak wt 500 pounds  3. Sleep apnea on CPAP  4. Incomplete LBBB  5. Surgeries:  a. Gastric sleeve  b. Left knee arthroscopy  c. Bilateral shoulder surgery    Past Medical History, Past Surgical History, Family history, Social History, and Medications were all reviewed with the patient today and updated as necessary.       Current Outpatient Medications:   •  albuterol (PROVENTIL HFA;VENTOLIN HFA) 108 (90 Base) MCG/ACT inhaler, 1-2 puffs q 4-6 hours PRN (Patient taking differently: Inhale 2 puffs Every 4 (Four) Hours As Needed for Shortness of Air. 1-2 puffs q 4-6 hours PRN), Disp: 1 inhaler, Rfl: 5  •  furosemide (LASIX) 40 MG tablet, Take 1 tablet by mouth Daily., Disp: 30 tablet, Rfl: 11  •  HYDROcodone-acetaminophen (NORCO)  MG per tablet, TAKE 1 TABLET  EVERY 6 HOURS prn, Disp: , Rfl: 0  •  omeprazole (priLOSEC) 20 MG capsule, Take 20 mg by mouth Daily., Disp: , Rfl:   •  sacubitril-valsartan (ENTRESTO)  MG tablet, Take 1 tablet by mouth 2 (Two) Times a Day., Disp: 60 tablet, Rfl: 11  •  traMADol (ULTRAM) 50 MG tablet, Take 50 mg by mouth 2 (Two) Times a Day., Disp: , Rfl: 1  •  carvedilol (COREG) 25 MG tablet, Take 25 mg by mouth 2 (Two) Times a Day With Meals., Disp: , Rfl:     No Known Allergies      Past Medical History:   Diagnosis Date   • Back pain     WHOLE BACK - LEFT LEG PAIN    • Bilateral arm pain     FROM NECK ISSUES    • CHF (congestive heart failure) (CMS/HCC)    • Enlarged heart    • GERD (gastroesophageal reflux disease)    • Hypertension    • Left leg pain     FROM BACK ISSUES   • Neck pain     BILAT ARM PAIN    • POOJA on CPAP     compliant with machine    • Sciatic nerve pain    • Sleep apnea    • SOBOE (shortness of breath on exertion)    • Wears glasses     readers     Past Surgical History:   Procedure Laterality Date   • CARDIAC CATHETERIZATION N/A 11/16/2018    Procedure: Left Heart Cath;  Surgeon: Matt Hernandez MD;  Location: [facility] INVASIVE LOCATION;  Service: Cardiovascular   • ENDOSCOPY     • GASTRIC SLEEVE LAPAROSCOPIC  2013   • KNEE ARTHROSCOPY Left    • SHOULDER ARTHROSCOPY Bilateral     both shoulder in the past   • WISDOM TOOTH EXTRACTION      aLL 4 REMOVED      Family History   Problem Relation Age of Onset   • Colon cancer Mother    • Coronary artery disease Father    • Heart disease Father      Social History     Tobacco Use   • Smoking status: Never Smoker   • Smokeless tobacco: Never Used   Substance Use Topics   • Alcohol use: Yes     Comment: twice a year       ROS:  Review of Systems:  General: Increased wt gain and fatigue  Skin: no rashes, lumps, or other skin changes  HEENT: no dizziness, lightheadedness, or vision changes  Respiratory: no cough or hemoptysis  Cardiovascular: + SOB/WONG, increased  "swelling  Gastrointestinal: no black/tarry stools or diarrhea  Urinary: no change in frequency or urgency  Peripheral Vascular: no claudication or leg cramps  Musculoskeletal: no muscle or joint pain/stiffness  Psychiatric: no depression or excessive stress  Neurological: no sensory or motor loss, no syncope  Hematologic: no anemia, easy bruising or bleeding  Endocrine: no thyroid problems, nor heat or cold intolerance       PHYSICAL EXAM:   /76 (BP Location: Left arm, Patient Position: Sitting)   Pulse 71   Ht 165.1 cm (65\")   Wt (!) 166 kg (365 lb 12.8 oz)   BMI 60.87 kg/m²      Wt Readings from Last 5 Encounters:   03/26/19 (!) 166 kg (365 lb 12.8 oz)   02/27/19 (!) 162 kg (358 lb)   02/20/19 (!) 164 kg (361 lb)   01/22/19 (!) 164 kg (361 lb)   01/16/19 (!) 165 kg (363 lb)     BP Readings from Last 5 Encounters:   03/26/19 132/76   02/27/19 136/82   01/22/19 118/82   01/16/19 124/82   12/12/18 126/80       General-Well Nourished, Well developed  Eyes - PERRLA  Neck- supple, No mass  CV- regular rate and rhythm, no MRG  Lung- clear bilaterally  Abd- soft, +BS  Musc/skel - Norm strength and range of motion  Skin- warm and dry  Neuro - Alert & Oriented x 3, appropriate mood.    Medical problems and test results were reviewed with the patient today.     Results for orders placed or performed during the hospital encounter of 02/20/19   Adult Transthoracic Echo Complete W/ Cont if Necessary Per Protocol   Result Value Ref Range    BSA 2.5 m^2    IVSd 1.1 cm    LVIDd 6.7 cm    LVIDs 6.0 cm    LVPWd 0.99 cm    IVS/LVPW 1.1     FS 10.3 %    EDV(Teich) 232.7 ml    ESV(Teich) 181.7 ml    EF(Teich) 21.9 %    EDV(cubed) 303.2 ml    ESV(cubed) 218.7 ml    EF(cubed) 27.9 %    LV mass(C)d 314.8 grams    LV mass(C)dI 123.8 grams/m^2    SV(Teich) 51.0 ml    SI(Teich) 20.1 ml/m^2    SV(cubed) 84.5 ml    SI(cubed) 33.2 ml/m^2    Ao root diam 2.8 cm    Ao root area 6.0 cm^2    LA dimension 5.7 cm    LA/Ao 2.1     LVOT diam " 2.1 cm    LVOT area 3.4 cm^2    LVOT area(traced) 3.5 cm^2    LAd major 7.2 cm    LA volume 109.0 ml    Ao root area (BSA corrected) 1.1     LA Volume Index 42.9 ml/m^2    MV E max erickson 106.6 cm/sec    MV A max erickson 74.0 cm/sec    MV E/A 1.4     MV dec time 0.17 sec    Ao pk erickson 112.2 cm/sec    Ao max PG 5.0 mmHg    Ao max PG (full) 3.1 mmHg    Ao V2 mean 83.7 cm/sec    Ao mean PG 3.1 mmHg    Ao mean PG (full) 2.1 mmHg    Ao V2 VTI 21.8 cm    VIKI(I,A) 2.2 cm^2    VIKI(I,D) 2.2 cm^2    VIKI(V,A) 2.1 cm^2    VIKI(V,D) 2.1 cm^2    LV V1 max PG 1.9 mmHg    LV V1 mean PG 1.0 mmHg    LV V1 max 69.1 cm/sec    LV V1 mean 47.2 cm/sec    LV V1 VTI 14.1 cm    SV(Ao) 131.7 ml    SI(Ao) 51.8 ml/m^2    SV(LVOT) 47.5 ml    SI(LVOT) 18.7 ml/m^2    PA V2 max 86.9 cm/sec    PA max PG 3.0 mmHg    PA acc slope 1,058 cm/sec^2    PA acc time 0.08 sec    PA pr(Accel) 44.1 mmHg    Lat E/e'  11.6     Med E/e' 18.4     Lat Peak E' Erickson 9.1 cm/sec    Med Peak E' Erickson 5.7 cm/sec     CV ECHO MINDA - BZI_BMI 60.1 kilograms/m^2     CV ECHO MINDA - BSA(HAYCOCK) 2.9 m^2     CV ECHO MINDA - BZI_METRIC_WEIGHT 163.8 kg     CV ECHO MINDA - BZI_METRIC_HEIGHT 165.1 cm    Avg E/e' ratio 14.41      CV VAS BP RIGHT /82 mmHg    Echo EF Estimated 25 %         Lab Results   Component Value Date    CHOL 172 11/16/2018    HDL 44 11/16/2018     11/16/2018    VLDL 20.2 10/04/2018       EKG:  (EKG/Tracing has been independently visualized by me and summarized below)      ECG 12 Lead  Date/Time: 3/26/2019 8:28 AM  Performed by: Romeo Day MD  Authorized by: Romeo Day MD   Rhythm: sinus rhythm  Rate: normal  BPM: 71  Conduction: non-specific intraventricular conduction delay  ST Segments: ST segments normal  T Waves: T waves normal  QRS axis: left  Other: no other findings    Clinical impression: abnormal EKG            ASSESSMENT and PLAN    1. Chronic systolic heart failure-nonischemic. Persistently low LVEF for >90 days despite  optimal rx. FC II-III HF symptoms. Will plan for VVI ICD implantation.     Return for following procedure .    Electronically signed by SEDRICK Mcmahon, 03/26/19, 7:53 AM.    I have personally performed a face to face diagnostic evaluation on this patient.  I have reviewed and agree with the care plan.  History and Exam by me shows: This is a 48-year-old with a history of a nonischemic dilated cardiomyopathy.  It is felt that his heart failure may be secondary to sleep apnea or chronic morbid obesity.  He has class II heart failure symptoms.  His EF remains low despite maximizing medical therapy.  He has indications for a single-chamber ICD. We discussed the procedure in great detail including risks, benefits, and alternatives. The patient understands that risks include bleeding, infection, stroke, MI, cardiac perforation, and even death.    1. VVI ICD - [place] [employer] vs [employer]     Romeo Day M.D., F.EARL.C, F.H.R.S.  Cardiology/Electrophysiology  03/26/19  12:53 PM

## 2019-04-01 ENCOUNTER — HOSPITAL ENCOUNTER (OUTPATIENT)
Facility: HOSPITAL | Age: 49
Discharge: HOME | End: 2019-04-02
Attending: INTERNAL MEDICINE | Admitting: INTERNAL MEDICINE

## 2019-04-01 DIAGNOSIS — I42.0 DILATED CARDIOMYOPATHY (HCC): ICD-10-CM

## 2019-04-01 PROCEDURE — 25010000003 CEFAZOLIN IN DEXTROSE 2-4 GM/100ML-% SOLUTION: Performed by: PHYSICIAN ASSISTANT

## 2019-04-01 PROCEDURE — 63710000001 OXYCODONE-ACETAMINOPHEN 5-325 MG TABLET: Performed by: INTERNAL MEDICINE

## 2019-04-01 PROCEDURE — 94799 UNLISTED PULMONARY SVC/PX: CPT

## 2019-04-01 PROCEDURE — 63710000001 CARVEDILOL 12.5 MG TABLET: Performed by: INTERNAL MEDICINE

## 2019-04-01 PROCEDURE — C1722 AICD, SINGLE CHAMBER: HCPCS | Performed by: INTERNAL MEDICINE

## 2019-04-01 PROCEDURE — C1895 LEAD, AICD, ENDO DUAL COIL: HCPCS

## 2019-04-01 PROCEDURE — A9270 NON-COVERED ITEM OR SERVICE: HCPCS | Performed by: INTERNAL MEDICINE

## 2019-04-01 PROCEDURE — 93641 EP EVL 1/2CHMB PAC CVDFB TST: CPT | Performed by: INTERNAL MEDICINE

## 2019-04-01 PROCEDURE — C1892 INTRO/SHEATH,FIXED,PEEL-AWAY: HCPCS | Performed by: INTERNAL MEDICINE

## 2019-04-01 PROCEDURE — 99153 MOD SED SAME PHYS/QHP EA: CPT | Performed by: INTERNAL MEDICINE

## 2019-04-01 PROCEDURE — 25010000002 MIDAZOLAM PER 1 MG: Performed by: INTERNAL MEDICINE

## 2019-04-01 PROCEDURE — 99152 MOD SED SAME PHYS/QHP 5/>YRS: CPT | Performed by: INTERNAL MEDICINE

## 2019-04-01 PROCEDURE — 33249 INSJ/RPLCMT DEFIB W/LEAD(S): CPT | Performed by: INTERNAL MEDICINE

## 2019-04-01 PROCEDURE — 25010000002 FENTANYL CITRATE (PF) 100 MCG/2ML SOLUTION: Performed by: INTERNAL MEDICINE

## 2019-04-01 PROCEDURE — 63710000001 SACUBITRIL-VALSARTAN 97-103 MG TABLET: Performed by: INTERNAL MEDICINE

## 2019-04-01 PROCEDURE — 25010000002 CEFAZOLIN PER 500 MG: Performed by: INTERNAL MEDICINE

## 2019-04-01 PROCEDURE — 94660 CPAP INITIATION&MGMT: CPT

## 2019-04-01 PROCEDURE — S0260 H&P FOR SURGERY: HCPCS | Performed by: INTERNAL MEDICINE

## 2019-04-01 DEVICE — IMPLANTABLE CARDIOVERTER DEFIBRILLATOR VR
Type: IMPLANTABLE DEVICE | Status: FUNCTIONAL
Brand: DYNAGEN™ EL ICD VR

## 2019-04-01 RX ORDER — ACETAMINOPHEN 325 MG/1
650 TABLET ORAL EVERY 4 HOURS PRN
Status: DISCONTINUED | OUTPATIENT
Start: 2019-04-01 | End: 2019-04-02 | Stop reason: HOSPADM

## 2019-04-01 RX ORDER — SODIUM CHLORIDE 0.9 % (FLUSH) 0.9 %
1-10 SYRINGE (ML) INJECTION AS NEEDED
Status: DISCONTINUED | OUTPATIENT
Start: 2019-04-01 | End: 2019-04-02 | Stop reason: HOSPADM

## 2019-04-01 RX ORDER — MIDAZOLAM HYDROCHLORIDE 1 MG/ML
INJECTION INTRAMUSCULAR; INTRAVENOUS AS NEEDED
Status: DISCONTINUED | OUTPATIENT
Start: 2019-04-01 | End: 2019-04-01 | Stop reason: HOSPADM

## 2019-04-01 RX ORDER — ACETAMINOPHEN 650 MG/1
650 SUPPOSITORY RECTAL EVERY 4 HOURS PRN
Status: DISCONTINUED | OUTPATIENT
Start: 2019-04-01 | End: 2019-04-02 | Stop reason: HOSPADM

## 2019-04-01 RX ORDER — BUPIVACAINE HYDROCHLORIDE 5 MG/ML
INJECTION, SOLUTION PERINEURAL AS NEEDED
Status: DISCONTINUED | OUTPATIENT
Start: 2019-04-01 | End: 2019-04-01 | Stop reason: HOSPADM

## 2019-04-01 RX ORDER — TEMAZEPAM 15 MG/1
15 CAPSULE ORAL NIGHTLY PRN
Status: DISCONTINUED | OUTPATIENT
Start: 2019-04-01 | End: 2019-04-02 | Stop reason: HOSPADM

## 2019-04-01 RX ORDER — FENTANYL CITRATE 50 UG/ML
INJECTION, SOLUTION INTRAMUSCULAR; INTRAVENOUS AS NEEDED
Status: DISCONTINUED | OUTPATIENT
Start: 2019-04-01 | End: 2019-04-01 | Stop reason: HOSPADM

## 2019-04-01 RX ORDER — LIDOCAINE HYDROCHLORIDE AND EPINEPHRINE 10; 10 MG/ML; UG/ML
INJECTION, SOLUTION INFILTRATION; PERINEURAL AS NEEDED
Status: DISCONTINUED | OUTPATIENT
Start: 2019-04-01 | End: 2019-04-01 | Stop reason: HOSPADM

## 2019-04-01 RX ORDER — CARVEDILOL 12.5 MG/1
25 TABLET ORAL 2 TIMES DAILY WITH MEALS
Status: DISCONTINUED | OUTPATIENT
Start: 2019-04-01 | End: 2019-04-02 | Stop reason: HOSPADM

## 2019-04-01 RX ORDER — SODIUM CHLORIDE 0.9 % (FLUSH) 0.9 %
3-10 SYRINGE (ML) INJECTION AS NEEDED
Status: DISCONTINUED | OUTPATIENT
Start: 2019-04-01 | End: 2019-04-01 | Stop reason: HOSPADM

## 2019-04-01 RX ORDER — FUROSEMIDE 40 MG/1
40 TABLET ORAL DAILY
Status: DISCONTINUED | OUTPATIENT
Start: 2019-04-01 | End: 2019-04-02 | Stop reason: HOSPADM

## 2019-04-01 RX ORDER — CEFAZOLIN SODIUM 2 G/100ML
2 INJECTION, SOLUTION INTRAVENOUS EVERY 8 HOURS
Status: COMPLETED | OUTPATIENT
Start: 2019-04-01 | End: 2019-04-02

## 2019-04-01 RX ORDER — ACETAMINOPHEN 160 MG/5ML
650 SOLUTION ORAL EVERY 4 HOURS PRN
Status: DISCONTINUED | OUTPATIENT
Start: 2019-04-01 | End: 2019-04-02 | Stop reason: HOSPADM

## 2019-04-01 RX ORDER — OXYCODONE HYDROCHLORIDE AND ACETAMINOPHEN 5; 325 MG/1; MG/1
1 TABLET ORAL EVERY 4 HOURS PRN
Status: DISCONTINUED | OUTPATIENT
Start: 2019-04-01 | End: 2019-04-02 | Stop reason: HOSPADM

## 2019-04-01 RX ORDER — SODIUM CHLORIDE 9 MG/ML
INJECTION, SOLUTION INTRAVENOUS CONTINUOUS PRN
Status: COMPLETED | OUTPATIENT
Start: 2019-04-01 | End: 2019-04-01

## 2019-04-01 RX ORDER — SODIUM CHLORIDE 0.9 % (FLUSH) 0.9 %
3 SYRINGE (ML) INJECTION EVERY 12 HOURS SCHEDULED
Status: DISCONTINUED | OUTPATIENT
Start: 2019-04-01 | End: 2019-04-01 | Stop reason: HOSPADM

## 2019-04-01 RX ORDER — SODIUM CHLORIDE 0.9 % (FLUSH) 0.9 %
3 SYRINGE (ML) INJECTION EVERY 12 HOURS SCHEDULED
Status: DISCONTINUED | OUTPATIENT
Start: 2019-04-01 | End: 2019-04-02 | Stop reason: HOSPADM

## 2019-04-01 RX ORDER — CEFAZOLIN SODIUM 2 G/100ML
2 INJECTION, SOLUTION INTRAVENOUS ONCE
Status: COMPLETED | OUTPATIENT
Start: 2019-04-01 | End: 2019-04-01

## 2019-04-01 RX ORDER — ONDANSETRON 2 MG/ML
4 INJECTION INTRAMUSCULAR; INTRAVENOUS EVERY 6 HOURS PRN
Status: DISCONTINUED | OUTPATIENT
Start: 2019-04-01 | End: 2019-04-02 | Stop reason: HOSPADM

## 2019-04-01 RX ORDER — TRAMADOL HYDROCHLORIDE 50 MG/1
50 TABLET ORAL 2 TIMES DAILY
Status: DISCONTINUED | OUTPATIENT
Start: 2019-04-01 | End: 2019-04-02 | Stop reason: HOSPADM

## 2019-04-01 RX ADMIN — CARVEDILOL 25 MG: 12.5 TABLET, FILM COATED ORAL at 17:13

## 2019-04-01 RX ADMIN — SACUBITRIL AND VALSARTAN 1 TABLET: 97; 103 TABLET, FILM COATED ORAL at 22:13

## 2019-04-01 RX ADMIN — OXYCODONE HYDROCHLORIDE AND ACETAMINOPHEN 1 TABLET: 5; 325 TABLET ORAL at 15:39

## 2019-04-01 RX ADMIN — DEXTROSE MONOHYDRATE 2 G: 5 INJECTION, SOLUTION INTRAVENOUS at 15:39

## 2019-04-01 RX ADMIN — CEFAZOLIN SODIUM 2 G: 2 INJECTION, SOLUTION INTRAVENOUS at 08:11

## 2019-04-01 RX ADMIN — SODIUM CHLORIDE, PRESERVATIVE FREE 3 ML: 5 INJECTION INTRAVENOUS at 22:12

## 2019-04-01 NOTE — PROCEDURES
PRE-ELECTROPHYSIOLOGY STUDY DIAGNOSES  1. Chronic systolic heart failure, ejection fraction of 25%  2. Non ischemic dilated cardiomyopathy.  3. Class II heart failure symptoms.  4. Chronic systolic heart failure for greater than 1 year's duration.  5. Primary prevention indications for defibrillator  6. No hospitalizations for congestive heart failure.  7. Life expectancy greater than 1 year.  8. On Guideline directed medical therapy maximum dose for 3 months prior to implant.         PROCEDURE PERFORMED  1. Insertion of an implantable cardioverter defibrillator.  2. Testing of implantable cardioverter defibrillator.  3. Moderate sedation    Anesthesia: Cath lab moderate sedation    I was present with the patient for the duration of moderate sedation and supervised staff who had no other duties and monitored the patient for the entire procedure     Name of independent trained observer: Valentine Johnson RN  Intra-Service start time: 0808  Intra-Service end time: 0915    Estimated Blood Loss: Less than 10 mL     Specimens: None      PROCEDURE IN DETAIL: The patient was brought into the EP lab in a fasting  state. The left shoulder was prepped and draped in the usual sterile  fashion. Skin anesthetized with lidocaine with epinephrine. Incision was  made in the region of the deltopectoral groove. Pocket was made for the  ICD. Access was obtained in the left subclavian vein via  the Seldinger technique over which a guidewire was placed.  Over the guidewire, an 8-[place] sheath was placed. Through this  8-[place] sheath, a [employer] ICD lead, model [place] 4-Front was placed  at the RV apex, we achieved the following values: R waves were 14.1,  threshold was 0.4 V at 0.4 msec pulse width. This lead was then secured  to the pectoral fascia with 0 Ti-Cron x2.  Pocket was irrigated  with triple antibiotic flush. The leads were connected to a [employer]  ICD, model Dynagen EL ICD VR, serial #[id number]. The  leads  and ICD were then placed in the pocket area. Defibrillator  threshold testing was performed and the patient was placed into  ventricular fibrillation, was converted out with 25 joules of energy.  Pocket was then closed with 2-0 Vicryl, followed by a next layer of 3-0  Vicryl, followed by a superficial layer of staples. The wound was  dressed. The patient was recovered from his sedation, transferred from  the lab in a stable condition.    IMPRESSION: Successful implantation of [employer] implantable cardioverter defibrillator for primary prevention of sudden death.

## 2019-04-01 NOTE — H&P
Primary Cardiologist: Dr. Hernandez     Chief Complaint: chronic systolic heart failure       Subjective:     Patient is a 48 y.o. male who presents with chronic systolic heart failure for VVI ICD implant. No changes since last office visit     History:  Patient is a 48 year old male with a history of morbid obesity and chronic systolic heart failure that presents today for evaluation for ICD. His EF was initially found to be low in Oct 2018. He then underwent LHC that showed normal coronaries. He was placed on optimal rx and echo was rechecked in Feb 2019 with no improvement and it remained at 25%. He is symptomatic with fatigue and WONG when he climbs stairs. No CP, edema, palpitations.         Cardiac PMH: (Old records have been reviewed and summarized below)     PROBLEM LIST:  1. Cardiomyopathy, nonischemic  a.  ECHO EF 25% with RVSP 57 mmHg. Mild MR>  b. 11/16/18 cardiac catheterization with normal coronary arteries. Dilated cardiomyopathy with EF of 15%.  Significantly elevated LVEDP.  c. 2/20/19 echo EF 25%. Mild to moderate MR.  2. Morbid obesity - Peak wt 500 pounds  3. Sleep apnea on CPAP  4. Incomplete LBBB  5. Surgeries:  a. Gastric sleeve  b. Left knee arthroscopy  c. Bilateral shoulder surgery        Past Medical History:   Diagnosis Date   • Back pain     WHOLE BACK - LEFT LEG PAIN    • Bilateral arm pain     FROM NECK ISSUES    • CHF (congestive heart failure) (CMS/HCC)    • Enlarged heart    • GERD (gastroesophageal reflux disease)    • Hypertension    • Left leg pain     FROM BACK ISSUES   • Neck pain     BILAT ARM PAIN    • POOJA on CPAP     compliant with machine    • Sciatic nerve pain    • Sleep apnea    • SOBOE (shortness of breath on exertion)    • Wears glasses     readers      Past Surgical History:   Procedure Laterality Date   • CARDIAC CATHETERIZATION N/A 11/16/2018    Procedure: Left Heart Cath;  Surgeon: Matt Hernandez MD;  Location: [facility] CATH INVASIVE LOCATION;  Service:  "Cardiovascular   • ENDOSCOPY     • GASTRIC SLEEVE LAPAROSCOPIC  2013   • KNEE ARTHROSCOPY Left    • SHOULDER ARTHROSCOPY Bilateral     both shoulder in the past   • WISDOM TOOTH EXTRACTION      aLL 4 REMOVED       No Known Allergies  Social History     Tobacco Use   • Smoking status: Never Smoker   • Smokeless tobacco: Never Used   Substance Use Topics   • Alcohol use: Yes     Comment: twice a year      FH:   Family History   Problem Relation Age of Onset   • Colon cancer Mother    • Coronary artery disease Father    • Heart disease Father           Current Facility-Administered Medications:   •  ceFAZolin in dextrose (ANCEF) IVPB solution 2 g, 2 g, Intravenous, Once, Jodie Henriquez PA  •  sodium chloride 0.9 % flush 3 mL, 3 mL, Intravenous, Q12H, Jodie Henriquez PA  •  sodium chloride 0.9 % flush 3-10 mL, 3-10 mL, Intravenous, PRN, Jodie Henriquez PA    Review of Systems  Review of Systems:  General: Increased wt gain and fatigue  Skin: no rashes, lumps, or other skin changes  HEENT: no dizziness, lightheadedness, or vision changes  Respiratory: no cough or hemoptysis  Cardiovascular: + SOB/WONG, increased swelling  Gastrointestinal: no black/tarry stools or diarrhea  Urinary: no change in frequency or urgency  Peripheral Vascular: no claudication or leg cramps  Musculoskeletal: no muscle or joint pain/stiffness  Psychiatric: no depression or excessive stress  Neurological: no sensory or motor loss, no syncope  Hematologic: no anemia, easy bruising or bleeding  Endocrine: no thyroid problems, nor heat or cold intolerance        Objective:       BP 97/53   Pulse 69   Temp 97.2 °F (36.2 °C) (Temporal)   Resp 18   Ht 165.1 cm (65\")   Wt (!) 167 kg (367 lb 1.6 oz)   SpO2 95%   BMI 61.09 kg/m²     No intake/output data recorded.  No intake/output data recorded.    Physical Exam:  General-Well Nourished, Well developed  Eyes - PERRLA  Neck- supple, No mass  CV- regular rate and rhythm, no MRG  Lung- clear " bilaterally  Abd- soft, +BS  Musc/skel - Norm strength and range of motion  Skin- warm and dry  Neuro - Alert & Oriented x 3, appropriate mood.      Data Review:     No results found for this or any previous visit (from the past 24 hour(s)).    Labs from 3/26/19 have been reviewed.     Assessment:     Dilated cardiomyopathy (CMS/HCC)         Plan:     1. Chronic systolic heart failure-nonischemic. Persistently low LVEF for >90 days despite optimal rx. FC II HF symptoms. Will undergoVVI ICD implantation. Risks, benefits, and alternatives have been discussed and patient wishes to proceed.           Electronically signed by SEDRICK Mcmahon, 04/01/19, 7:23 AM.

## 2019-04-02 ENCOUNTER — CLINICAL SUPPORT NO REQUIREMENTS (OUTPATIENT)
Dept: CARDIOLOGY | Facility: CLINIC | Age: 49
End: 2019-04-02

## 2019-04-02 ENCOUNTER — APPOINTMENT (OUTPATIENT)
Dept: GENERAL RADIOLOGY | Facility: HOSPITAL | Age: 49
End: 2019-04-02

## 2019-04-02 VITALS
OXYGEN SATURATION: 96 % | BODY MASS INDEX: 52.48 KG/M2 | HEART RATE: 80 BPM | DIASTOLIC BLOOD PRESSURE: 62 MMHG | HEIGHT: 65 IN | WEIGHT: 315 LBS | RESPIRATION RATE: 16 BRPM | SYSTOLIC BLOOD PRESSURE: 113 MMHG | TEMPERATURE: 97.9 F

## 2019-04-02 DIAGNOSIS — I42.0 DILATED CARDIOMYOPATHY (HCC): Primary | ICD-10-CM

## 2019-04-02 PROCEDURE — 63710000001 OXYCODONE-ACETAMINOPHEN 5-325 MG TABLET: Performed by: INTERNAL MEDICINE

## 2019-04-02 PROCEDURE — A9270 NON-COVERED ITEM OR SERVICE: HCPCS | Performed by: INTERNAL MEDICINE

## 2019-04-02 PROCEDURE — 93010 ELECTROCARDIOGRAM REPORT: CPT | Performed by: INTERNAL MEDICINE

## 2019-04-02 PROCEDURE — 71046 X-RAY EXAM CHEST 2 VIEWS: CPT

## 2019-04-02 PROCEDURE — 63710000001 CARVEDILOL 12.5 MG TABLET: Performed by: INTERNAL MEDICINE

## 2019-04-02 PROCEDURE — 63710000001 SACUBITRIL-VALSARTAN 97-103 MG TABLET: Performed by: INTERNAL MEDICINE

## 2019-04-02 PROCEDURE — 63710000001 TRAMADOL 50 MG TABLET: Performed by: INTERNAL MEDICINE

## 2019-04-02 PROCEDURE — 25010000002 CEFAZOLIN PER 500 MG: Performed by: INTERNAL MEDICINE

## 2019-04-02 PROCEDURE — 93005 ELECTROCARDIOGRAM TRACING: CPT | Performed by: INTERNAL MEDICINE

## 2019-04-02 PROCEDURE — 93282 PRGRMG EVAL IMPLANTABLE DFB: CPT | Performed by: INTERNAL MEDICINE

## 2019-04-02 RX ORDER — CEPHALEXIN 500 MG/1
500 CAPSULE ORAL 3 TIMES DAILY
Qty: 9 CAPSULE | Refills: 0 | Status: SHIPPED | OUTPATIENT
Start: 2019-04-02 | End: 2019-04-05

## 2019-04-02 RX ORDER — OXYCODONE HYDROCHLORIDE AND ACETAMINOPHEN 5; 325 MG/1; MG/1
1 TABLET ORAL EVERY 6 HOURS PRN
Qty: 5 TABLET | Refills: 0 | Status: SHIPPED | OUTPATIENT
Start: 2019-04-02 | End: 2019-04-16

## 2019-04-02 RX ADMIN — CARVEDILOL 25 MG: 12.5 TABLET, FILM COATED ORAL at 07:23

## 2019-04-02 RX ADMIN — SACUBITRIL AND VALSARTAN 1 TABLET: 97; 103 TABLET, FILM COATED ORAL at 07:23

## 2019-04-02 RX ADMIN — SODIUM CHLORIDE, PRESERVATIVE FREE 3 ML: 5 INJECTION INTRAVENOUS at 07:25

## 2019-04-02 RX ADMIN — DEXTROSE MONOHYDRATE 2 G: 5 INJECTION, SOLUTION INTRAVENOUS at 00:15

## 2019-04-02 RX ADMIN — OXYCODONE HYDROCHLORIDE AND ACETAMINOPHEN 1 TABLET: 5; 325 TABLET ORAL at 02:50

## 2019-04-02 RX ADMIN — TRAMADOL HYDROCHLORIDE 50 MG: 50 TABLET, FILM COATED ORAL at 07:22

## 2019-04-02 NOTE — DISCHARGE SUMMARY
Physician Discharge Summary     Patient ID:  Matt Bueno  [id number]  48 y.o.  1970    Admit date: 4/1/2019    Discharge date and time: No discharge date for patient encounter.     Admitting Physician: Romeo Day MD     Primary Physician: Stas Rust MD    Discharge Physician: Romeo Day MD    Admission Diagnoses: Dilated cardiomyopathy (CMS/HCC) [I42.0]  Dilated cardiomyopathy (CMS/HCC) [I42.0]    Discharge Diagnoses:   Patient Active Problem List    Diagnosis   • *Dilated cardiomyopathy (CMS/HCC) [I42.0]   • Chronic systolic congestive heart failure (CMS/HCC) [I50.22]   • Cardiomyopathy (CMS/HCC) [I42.9]   • Morbidly obese (CMS/HCC) [E66.01]   • Screen for colon cancer [Z12.11]   • LBP (low back pain) [M54.5]   • Adiposity [E66.9]   • POOJA on CPAP [G47.33, Z99.89]       Cardiology Procedures this admission:    1. [facility] ICD    [facility] Course: Patient had cardiac device placement. Post device CXR and device check were stable. Please see operation report for full implant details.    Discharge Exam:    Vitals:    04/02/19 0722   BP: 113/62   Pulse: 80   Resp:    Temp:    SpO2: 96%      General-Well Nourished, Well developed  Eyes - PERRLA  Neck- supple, No mass  CV- regular rate and rhythm, no MRG, No edema  Lung- clear bilaterally  Abd- soft, +BS  Musc/skel - Norm strength and range of motion  Skin- warm and dry  Neuro - Alert & Oriented x 3, appropriate mood.    Disposition: Patient will be discharged home    Patient discharge medications:      Your medication list      START taking these medications      Instructions Last Dose Given Next Dose Due   cephalexin 500 MG capsule  Commonly known as:  KEFLEX      Take 1 capsule by mouth 3 (Three) Times a Day for 3 days.       oxyCODONE-acetaminophen 5-325 MG per tablet  Commonly known as:  PERCOCET      Take 1 tablet by mouth Every 6 (Six) Hours As Needed for Moderate Pain .          CHANGE how you take these medications       Instructions Last Dose Given Next Dose Due   albuterol sulfate  (90 Base) MCG/ACT inhaler  Commonly known as:  PROVENTIL HFA;VENTOLIN HFA;PROAIR HFA  What changed:    · how much to take  · how to take this  · when to take this  · reasons to take this  · additional instructions      1-2 puffs q 4-6 hours PRN          CONTINUE taking these medications      Instructions Last Dose Given Next Dose Due   carvedilol 25 MG tablet  Commonly known as:  COREG      Take 25 mg by mouth 2 (Two) Times a Day With Meals.       furosemide 40 MG tablet  Commonly known as:  LASIX      Take 1 tablet by mouth Daily.       HYDROcodone-acetaminophen  MG per tablet  Commonly known as:  NORCO      TAKE 1 TABLET EVERY 6 HOURS prn       omeprazole 20 MG capsule  Commonly known as:  priLOSEC      Take 20 mg by mouth Daily.       sacubitril-valsartan  MG tablet  Commonly known as:  ENTRESTO      Take 1 tablet by mouth 2 (Two) Times a Day.       traMADol 50 MG tablet  Commonly known as:  ULTRAM      Take 50 mg by mouth 2 (Two) Times a Day.             Where to Get Your Medications      You can get these medications from any pharmacy    Bring a paper prescription for each of these medications  · cephalexin 500 MG capsule  · oxyCODONE-acetaminophen 5-325 MG per tablet         Referenced discharge instructions provided by nursing for diet and activity.    Follow-up with Pacemaker/ICD Clinic in 12 to 14 days    Signed:  Romeo Day MD  4/2/2019  7:44 AM

## 2019-04-08 ENCOUNTER — OFFICE VISIT (OUTPATIENT)
Dept: FAMILY MEDICINE CLINIC | Facility: CLINIC | Age: 49
End: 2019-04-08

## 2019-04-08 VITALS
HEIGHT: 65 IN | RESPIRATION RATE: 16 BRPM | SYSTOLIC BLOOD PRESSURE: 124 MMHG | TEMPERATURE: 98 F | HEART RATE: 72 BPM | WEIGHT: 315 LBS | BODY MASS INDEX: 52.48 KG/M2 | DIASTOLIC BLOOD PRESSURE: 74 MMHG

## 2019-04-08 DIAGNOSIS — R11.2 NAUSEA AND VOMITING, INTRACTABILITY OF VOMITING NOT SPECIFIED, UNSPECIFIED VOMITING TYPE: ICD-10-CM

## 2019-04-08 DIAGNOSIS — H81.13 BENIGN PAROXYSMAL POSITIONAL VERTIGO DUE TO BILATERAL VESTIBULAR DISORDER: Primary | ICD-10-CM

## 2019-04-08 PROCEDURE — 99213 OFFICE O/P EST LOW 20 MIN: CPT | Performed by: FAMILY MEDICINE

## 2019-04-08 RX ORDER — MECLIZINE HYDROCHLORIDE 25 MG/1
TABLET ORAL
Qty: 40 TABLET | Refills: 1 | Status: SHIPPED | OUTPATIENT
Start: 2019-04-08 | End: 2020-02-03

## 2019-04-08 RX ORDER — PROMETHAZINE HYDROCHLORIDE 25 MG/1
TABLET ORAL
Qty: 20 TABLET | Refills: 1 | Status: SHIPPED | OUTPATIENT
Start: 2019-04-08 | End: 2020-02-03

## 2019-04-08 NOTE — PROGRESS NOTES
Subjective   Matt Bueno is a 48 y.o. male.     History of Present Illness     He was at homeyesterday AM and felt like his balance was poor, spinning sensation that was worse with movement of his head  He did not fall but it was hard to walk, had to get assistance  Moving too fast makes him feel like he could fall over  He went to the ER yesterday and was checked out with imaning and EKG  He was told to follow up here and was not given any treatment        Review of Systems   Constitutional: Negative.    Neurological: Positive for dizziness.       Objective   Physical Exam   Constitutional: He appears well-developed and well-nourished. No distress.   Eyes: Conjunctivae and EOM are normal. Pupils are equal, round, and reactive to light.   No nystagmus   Cardiovascular: Normal rate, regular rhythm and normal heart sounds.   Pulmonary/Chest: Effort normal and breath sounds normal.   Musculoskeletal: Normal range of motion.   Walks slowly but without significant difficulty   Psychiatric: He has a normal mood and affect. His behavior is normal.   Nursing note and vitals reviewed.      Assessment/Plan   Matt was seen today for follow-up.    Diagnoses and all orders for this visit:    Benign paroxysmal positional vertigo due to bilateral vestibular disorder  -     meclizine (ANTIVERT) 25 MG tablet; 1/2 to 1 to 2 pills as needed every 4 hours for dizziness    Nausea and vomiting, intractability of vomiting not specified, unspecified vomiting type  -     promethazine (PHENERGAN) 25 MG tablet; 1/2-1 po q 6 hours PRN    ok meclizine PRN, epley maneuvers at home and time.  Discussed this could take 2 days to a couple weeks to resolve. He will call back if worse  Ok PRN phenergan for nausea

## 2019-04-16 ENCOUNTER — OFFICE VISIT (OUTPATIENT)
Dept: CARDIOLOGY | Facility: CLINIC | Age: 49
End: 2019-04-16

## 2019-04-16 VITALS
HEIGHT: 65 IN | HEART RATE: 76 BPM | BODY MASS INDEX: 52.48 KG/M2 | DIASTOLIC BLOOD PRESSURE: 74 MMHG | SYSTOLIC BLOOD PRESSURE: 112 MMHG | WEIGHT: 315 LBS

## 2019-04-16 DIAGNOSIS — Z95.810 PRESENCE OF BIVENTRICULAR IMPLANTABLE CARDIOVERTER-DEFIBRILLATOR (ICD): Primary | ICD-10-CM

## 2019-04-16 DIAGNOSIS — I42.8 OTHER CARDIOMYOPATHY (HCC): ICD-10-CM

## 2019-04-16 PROCEDURE — 99024 POSTOP FOLLOW-UP VISIT: CPT | Performed by: INTERNAL MEDICINE

## 2019-04-16 PROCEDURE — 93282 PRGRMG EVAL IMPLANTABLE DFB: CPT | Performed by: INTERNAL MEDICINE

## 2019-04-16 NOTE — PROGRESS NOTES
2019    Matt Bueno, : 1970    WOUND CHECK      Patient has fever: [] YES   [x] NO     Temperature if indicated:       Wound Location:  Left shoulder      Dressing was:  Removed       Old Dressing Appearance:  Old, bloody drainage        Wound Appearance:  Incision well-approximated with no signs or symptoms of infection        Gloves used, staples removed without diffuculty, wound cleansed with alcohol       Incision dresssed with triple antibiotic ointment, 4x4, and tegaderm with patient to remove in 3 days.  Verbal understanding from patient       Device was: Interrogated - Please see separate report        Plan:  Normal wound check      Appointment for follow-up scheduled for 3 months post procedure [x]    Future Appointments   Date Time Provider Department Center   2019  4:00 PM Romeo Day MD [facility] None           Micaela Moran RN, 19

## 2019-06-26 ENCOUNTER — TELEPHONE (OUTPATIENT)
Dept: CARDIOLOGY | Facility: CLINIC | Age: 49
End: 2019-06-26

## 2019-06-26 NOTE — TELEPHONE ENCOUNTER
Patient called to let us know he moved to [place] and wants us to change his pharmacy to the [facility] in [place], but also stated he is no longer has health insurance and will be on his wife's insurance in November.     When returning his call he did not answer.  LVM letting him know we can sample his Entresto  if we have samples and we can also try to complete patient assistance forms for him until he gets insurance in November. Will await pt return call.

## 2019-07-23 ENCOUNTER — OFFICE VISIT (OUTPATIENT)
Dept: CARDIOLOGY | Facility: CLINIC | Age: 49
End: 2019-07-23

## 2019-07-23 VITALS
BODY MASS INDEX: 52.48 KG/M2 | SYSTOLIC BLOOD PRESSURE: 114 MMHG | HEART RATE: 84 BPM | HEIGHT: 65 IN | DIASTOLIC BLOOD PRESSURE: 72 MMHG | WEIGHT: 315 LBS | OXYGEN SATURATION: 98 %

## 2019-07-23 DIAGNOSIS — I42.0 DILATED CARDIOMYOPATHY (HCC): ICD-10-CM

## 2019-07-23 DIAGNOSIS — I50.22 CHRONIC SYSTOLIC CONGESTIVE HEART FAILURE (HCC): Primary | ICD-10-CM

## 2019-07-23 DIAGNOSIS — R42 VERTIGO: ICD-10-CM

## 2019-07-23 PROCEDURE — 93282 PRGRMG EVAL IMPLANTABLE DFB: CPT | Performed by: PHYSICIAN ASSISTANT

## 2019-07-23 PROCEDURE — 99214 OFFICE O/P EST MOD 30 MIN: CPT | Performed by: PHYSICIAN ASSISTANT

## 2019-07-23 NOTE — PROGRESS NOTES
Matt Bueno  1970  PCP: Stas Rust MD    SUBJECTIVE:   Matt Bueno is a 48 y.o. male seen for a follow up visit regarding the following:     Chief Complaint: Follow up for NICM, ICD     HPI:    Since last visit the patient's status has been stable since device implant. He has been stable from device standpoint. He does note dizziness that he describes as the room spinning for the last 2 months. It is intermittent and he was seen in the ED and prescribed meclizine with no relief. He also feels this sensation if he looks up or turns his head to the side too quickly. No CP, sob, edema.     History:  Patient is a 48 year old male with a history of morbid obesity and chronic systolic heart failure that presents today for evaluation for ICD. His EF was initially found to be low in Oct 2018. He then underwent LHC that showed normal coronaries. He was placed on optimal rx and echo was rechecked in Feb 2019 with no improvement and it remained at 25%. He is symptomatic with fatigue and WONG when he climbs stairs. No CP, edema, palpitations.         Cardiac PMH: (Old records have been reviewed and summarized below)     PROBLEM LIST:  1. Cardiomyopathy, nonischemic  a.  ECHO EF 25% with RVSP 57 mmHg. Mild MR>  b. 11/16/18 cardiac catheterization with normal coronary arteries. Dilated cardiomyopathy with EF of 15%.  Significantly elevated LVEDP.  c. 2/20/19 echo EF 25%. Mild to moderate MR.  d. S/p BSC VVI ICD 4/1/19  2. Morbid obesity - Peak wt 500 pounds  3. Sleep apnea on CPAP  4. Incomplete LBBB  5. Surgeries:  a. Gastric sleeve  b. Left knee arthroscopy  c. Bilateral shoulder surgery          Current Outpatient Medications:   •  albuterol (PROVENTIL HFA;VENTOLIN HFA) 108 (90 Base) MCG/ACT inhaler, 1-2 puffs q 4-6 hours PRN (Patient taking differently: Inhale 2 puffs Every 4 (Four) Hours As Needed for Shortness of Air. 1-2 puffs q 4-6 hours PRN), Disp: 1 inhaler, Rfl: 5  •  carvedilol  (COREG) 25 MG tablet, Take 12.5 mg by mouth 2 (Two) Times a Day With Meals., Disp: , Rfl:   •  furosemide (LASIX) 40 MG tablet, Take 1 tablet by mouth Daily. (Patient taking differently: Take 40 mg by mouth As Needed.), Disp: 30 tablet, Rfl: 11  •  HYDROcodone-acetaminophen (NORCO)  MG per tablet, TAKE 1 TABLET EVERY 6 HOURS prn, Disp: , Rfl: 0  •  meclizine (ANTIVERT) 25 MG tablet, 1/2 to 1 to 2 pills as needed every 4 hours for dizziness, Disp: 40 tablet, Rfl: 1  •  omeprazole (priLOSEC) 20 MG capsule, Take 20 mg by mouth Daily., Disp: , Rfl:   •  promethazine (PHENERGAN) 25 MG tablet, 1/2-1 po q 6 hours PRN, Disp: 20 tablet, Rfl: 1  •  sacubitril-valsartan (ENTRESTO)  MG tablet, Take 1 tablet by mouth 2 (Two) Times a Day., Disp: 60 tablet, Rfl: 11  •  traMADol (ULTRAM) 50 MG tablet, Take 50 mg by mouth 2 (Two) Times a Day., Disp: , Rfl: 1    Past Medical History, Past Surgical History, Family history, Social History, and Medications were all reviewed with the patient today and updated as necessary.       Patient Active Problem List   Diagnosis   • LBP (low back pain)   • Adiposity   • POOJA on CPAP   • Screen for colon cancer   • Morbidly obese (CMS/HCC)   • Cardiomyopathy (CMS/HCC)   • Dilated cardiomyopathy (CMS/HCC)   • Chronic systolic congestive heart failure (CMS/HCC)     No Known Allergies  Past Medical History:   Diagnosis Date   • Back pain     WHOLE BACK - LEFT LEG PAIN    • Bilateral arm pain     FROM NECK ISSUES    • CHF (congestive heart failure) (CMS/HCC)    • Enlarged heart    • GERD (gastroesophageal reflux disease)    • Hypertension    • Left leg pain     FROM BACK ISSUES   • Neck pain     BILAT ARM PAIN    • POOJA on CPAP     compliant with machine    • Sciatic nerve pain    • Sleep apnea    • SOBOE (shortness of breath on exertion)    • Wears glasses     readers     Past Surgical History:   Procedure Laterality Date   • CARDIAC CATHETERIZATION N/A 11/16/2018    Procedure: Left Heart  "Cath;  Surgeon: Matt Hernandez MD;  Location:  [facility];  Service: Cardiovascular   • CARDIAC ELECTROPHYSIOLOGY PROCEDURE N/A 4/1/2019    Procedure: Device Implant- VDI ICD Biotronik;  Surgeon: Romeo Day MD;  Location:  [facility];  Service: Cardiology   • ENDOSCOPY     • GASTRIC SLEEVE LAPAROSCOPIC  2013   • KNEE ARTHROSCOPY Left    • SHOULDER ARTHROSCOPY Bilateral     both shoulder in the past   • WISDOM TOOTH EXTRACTION      aLL 4 REMOVED      Family History   Problem Relation Age of Onset   • Colon cancer Mother    • Coronary artery disease Father    • Heart disease Father      Social History     Tobacco Use   • Smoking status: Never Smoker   • Smokeless tobacco: Never Used   Substance Use Topics   • Alcohol use: Yes     Comment: twice a year         PHYSICAL EXAM:    /72 (BP Location: Right arm, Patient Position: Sitting)   Pulse 84   Ht 165.1 cm (65\")   Wt (!) 168 kg (370 lb)   SpO2 98%   BMI 61.57 kg/m²        Wt Readings from Last 5 Encounters:   07/23/19 (!) 168 kg (370 lb)   04/16/19 (!) 163 kg (360 lb)   04/08/19 (!) 165 kg (363 lb)   04/01/19 (!) 167 kg (367 lb 1.6 oz)   03/26/19 (!) 166 kg (365 lb 12.8 oz)       BP Readings from Last 5 Encounters:   07/23/19 114/72   04/16/19 112/74   04/08/19 124/74   04/02/19 113/62   03/26/19 132/76       General-Well Nourished, Well developed  Eyes - PERRLA  Neck- supple, No mass  CV- regular rate and rhythm, no MRG, No edema  Lung- clear bilaterally  Abd- obese, soft, +BS  Musc/skel - Norm strength and range of motion  Skin- warm and dry  Neuro - Alert & Oriented x 3, appropriate mood.        Medical problems and test results were reviewed with the patient today.     No results found for this or any previous visit (from the past 672 hour(s)).      EKG: (EKG has been independently visualized by me and summarized below)    Procedures     ASSESSMENT and PLAN    1. Chronic Systolic Heart Failure- nonischemic. " Well compensated w/ FC II HF symptoms. Continue Entresto and Coreg   2. NICM s/p VVI ICD with normal function on device interrogation. 3 VT-1 events but appear to be atrially driven. Longest 12 seconds   3. HTN- controlled. Continue Coreg and Entresto.   4. Vertigo- patient's symptoms sound classic for vertigo and does not correlate with any event on device interrogation. Meclizine was not helpful. Will get referral to ENT.       Return in about 6 months (around 1/23/2020).        Jodie Henriquez PA-C   Cardiology/Electrophysiology  7/23/2019  2:56 PM

## 2019-07-24 ENCOUNTER — TELEPHONE (OUTPATIENT)
Dept: CARDIOLOGY | Facility: CLINIC | Age: 49
End: 2019-07-24

## 2019-07-24 NOTE — TELEPHONE ENCOUNTER
Faxed patient assistance forms to [employer], confirmation received.  Placed patient's income information in mail to return to him.

## 2019-08-05 RX ORDER — CARVEDILOL 12.5 MG/1
TABLET ORAL
Qty: 180 TABLET | Refills: 3 | Status: SHIPPED | OUTPATIENT
Start: 2019-08-05 | End: 2020-08-04 | Stop reason: SDUPTHER

## 2019-08-08 ENCOUNTER — TELEPHONE (OUTPATIENT)
Dept: CARDIOLOGY | Facility: CLINIC | Age: 49
End: 2019-08-08

## 2019-08-08 NOTE — TELEPHONE ENCOUNTER
Spoke with patient, advised received approval from [facility] Patient Assistance for Entresto.  Placed his personal records in mail to send back to him which [facility].

## 2019-10-22 ENCOUNTER — TELEPHONE (OUTPATIENT)
Dept: CARDIOLOGY | Facility: CLINIC | Age: 49
End: 2019-10-22

## 2019-11-14 ENCOUNTER — CLINICAL SUPPORT NO REQUIREMENTS (OUTPATIENT)
Dept: CARDIOLOGY | Facility: CLINIC | Age: 49
End: 2019-11-14

## 2019-11-14 DIAGNOSIS — I42.0 DILATED CARDIOMYOPATHY (HCC): ICD-10-CM

## 2019-11-14 DIAGNOSIS — I50.22 CHRONIC SYSTOLIC CONGESTIVE HEART FAILURE (HCC): ICD-10-CM

## 2019-11-14 PROCEDURE — 93295 DEV INTERROG REMOTE 1/2/MLT: CPT | Performed by: INTERNAL MEDICINE

## 2019-11-14 PROCEDURE — 93296 REM INTERROG EVL PM/IDS: CPT | Performed by: INTERNAL MEDICINE

## 2019-11-26 ENCOUNTER — TELEPHONE (OUTPATIENT)
Dept: CARDIOLOGY | Facility: CLINIC | Age: 49
End: 2019-11-26

## 2019-11-26 NOTE — TELEPHONE ENCOUNTER
I called and scheduled the patient for an ENT appointment with Dr. Hawthorne in [place], [place] on December 18, 2019 at 2:20 pm. I faxed all of the referral information to them at [phone].      I tried to call the patient. No answer. I left a message.

## 2019-12-10 ENCOUNTER — TELEPHONE (OUTPATIENT)
Dept: FAMILY MEDICINE CLINIC | Facility: CLINIC | Age: 49
End: 2019-12-10

## 2019-12-10 NOTE — TELEPHONE ENCOUNTER
Pt called and stated that Mad[facility] in [facility] needs a new order for his cpap supplies can you please send?    Pt call [phone]

## 2020-02-03 ENCOUNTER — OFFICE VISIT (OUTPATIENT)
Dept: CARDIOLOGY | Facility: CLINIC | Age: 50
End: 2020-02-03

## 2020-02-03 VITALS
HEART RATE: 87 BPM | BODY MASS INDEX: 52.48 KG/M2 | SYSTOLIC BLOOD PRESSURE: 112 MMHG | OXYGEN SATURATION: 94 % | WEIGHT: 315 LBS | DIASTOLIC BLOOD PRESSURE: 70 MMHG | HEIGHT: 65 IN

## 2020-02-03 DIAGNOSIS — I42.0 DILATED CARDIOMYOPATHY (HCC): Primary | ICD-10-CM

## 2020-02-03 DIAGNOSIS — E66.01 MORBIDLY OBESE (HCC): ICD-10-CM

## 2020-02-03 DIAGNOSIS — I50.22 CHRONIC SYSTOLIC CONGESTIVE HEART FAILURE (HCC): ICD-10-CM

## 2020-02-03 PROCEDURE — 93283 PRGRMG EVAL IMPLANTABLE DFB: CPT | Performed by: INTERNAL MEDICINE

## 2020-02-03 PROCEDURE — 99214 OFFICE O/P EST MOD 30 MIN: CPT | Performed by: INTERNAL MEDICINE

## 2020-02-03 NOTE — PROGRESS NOTES
[facility] Cardiology  Subjective:     Encounter Date:02/03/2020      Patient ID: Matt Bueno is a  49 y.o. male.    Chief Complaint: Cardiomyopathy; Shortness of Breath; and Dizziness      PROBLEM LIST:  1. Nonischemic cardiomyopathy:  a. Echocardiogram, 10/31/2018: EF 25%. Mild MR. RVSP 57 mmHg.  b. LHC, 11/16/2018: Normal coronary arteries. Dilated cardiomyopathy with EF 15%. Significantly elevated LVEDP.  c. Echocardiogram, 02/20/2019: EF 25%. Mild-to-moderate MR.  d. ICD placement, 04/01/2019, Dr. Day: Connectem VVI ICD, model Dynagen EL ICD VR, serial #[id number].  2. Morbid obesity:  a. Peak weight 500 lbs.  b. Current weight 389 lbs, BMI 64.7.  3. Sleep apnea on CPAP  4. Incomplete LBBB  5. Surgeries:  a. Gastric sleeve  b. Left knee arthroscopy  c. Bilateral shoulder surgery    History of Present Illness  Matt Bueno returns today for follow up with a history of nonischemic cardiomyopathy. Since last visit, he underwent ICD placement with Dr. Day for primary prevention of sudden cardiac death. He has been experiencing some dizziness and loss of balance. He underwent ENT testing for vertigo about a year ago and took medication for this. He has been tolerating his medication with no significant side effects. Denies any exertional chest pain, shortness of breath, orthopnea, PND, or palpitations.    No Known Allergies      Current Outpatient Medications:   •  albuterol (PROVENTIL HFA;VENTOLIN HFA) 108 (90 Base) MCG/ACT inhaler, 1-2 puffs q 4-6 hours PRN (Patient taking differently: Inhale 2 puffs Every 4 (Four) Hours As Needed for Shortness of Air. 1-2 puffs q 4-6 hours PRN), Disp: 1 inhaler, Rfl: 5  •  carvedilol (COREG) 12.5 MG tablet, TAKE 1 TABLET BY MOUTH TWICE A DAY WITH MEALS, Disp: 180 tablet, Rfl: 3  •  furosemide (LASIX) 40 MG tablet, Take 1 tablet by mouth Daily. (Patient taking differently: Take 40 mg by mouth As Needed.), Disp: 30  "tablet, Rfl: 11  •  HYDROcodone-acetaminophen (NORCO)  MG per tablet, TAKE 1 TABLET EVERY 6 HOURS prn, Disp: , Rfl: 0  •  sacubitril-valsartan (ENTRESTO)  MG tablet, Take 1 tablet by mouth 2 (Two) Times a Day., Disp: 60 tablet, Rfl: 11  •  traMADol (ULTRAM) 50 MG tablet, Take 50 mg by mouth 2 (Two) Times a Day., Disp: , Rfl: 1    The following portions of the patient's history were reviewed and updated as appropriate: allergies, current medications, past family history, past medical history, past social history, past surgical history and problem list.    Review of Systems   Constitution: Positive for weight gain.   Cardiovascular: Positive for dyspnea on exertion.   Respiratory: Negative.    Hematologic/Lymphatic: Negative for bleeding problem. Does not bruise/bleed easily.   Skin: Negative for rash.   Musculoskeletal: Positive for back pain, joint pain, neck pain and stiffness. Negative for joint swelling, muscle weakness and myalgias.   Gastrointestinal: Negative for heartburn, nausea and vomiting.   Neurological: Positive for dizziness and loss of balance.          Objective:     Vitals:    02/03/20 1451   BP: 112/70   BP Location: Right arm   Patient Position: Sitting   Pulse: 87   SpO2: 94%   Weight: (!) 176 kg (389 lb)   Height: 165.1 cm (65\")         Physical Exam   Constitutional: He is oriented to person, place, and time. He appears well-developed and well-nourished.   HENT:   Mouth/Throat: Oropharynx is clear and moist.   Neck: No JVD present. Carotid bruit is not present. No thyromegaly present.   Cardiovascular: Regular rhythm, S1 normal, S2 normal, normal heart sounds and intact distal pulses. Exam reveals no gallop, no S3 and no S4.   No murmur heard.  Pulses:       Carotid pulses are 2+ on the right side, and 2+ on the left side.       Radial pulses are 2+ on the right side, and 2+ on the left side.   Pulmonary/Chest: Breath sounds normal.   Abdominal: Soft. Bowel sounds are normal. He " exhibits no mass. There is no tenderness.   Musculoskeletal: He exhibits no edema.   Neurological: He is alert and oriented to person, place, and time.   Skin: Skin is warm and dry. No rash noted.       Lab Review:  Lab Results   Component Value Date    GLUCOSE 101 (H) 03/26/2019    BUN 14 03/26/2019    CREATININE 0.71 03/26/2019    EGFRIFNONA 117 03/26/2019    EGFRIFAFRI 134 10/04/2018    BCR 19.7 03/26/2019    K 4.5 03/26/2019    CO2 28.0 03/26/2019    CALCIUM 9.0 03/26/2019    PROTENTOTREF 6.5 10/04/2018    ALBUMIN 4.14 03/26/2019    LABIL2 1.9 10/04/2018    AST 14 03/26/2019    ALT 14 03/26/2019     Lab Results   Component Value Date    CHOL 172 11/16/2018    CHLPL 166 10/04/2018    TRIG 164 (H) 11/16/2018    HDL 44 11/16/2018     11/16/2018      Lab Results   Component Value Date    WBC 6.54 03/26/2019    HGB 13.3 03/26/2019    HCT 40.9 03/26/2019    MCV 82.0 03/26/2019     03/26/2019       Procedures     Manual device interrogation, 02/03/20: Normal, well-functioning [employer] ICD with 12 years of battery life remaining. Events: 1 VT, 172 bpm, 25-30 seconds. <1% RV paced.         Assessment:   Matt was seen today for cardiomyopathy, shortness of breath and dizziness.    Diagnoses and all orders for this visit:    Dilated cardiomyopathy (CMS/HCC)    Chronic systolic congestive heart failure (CMS/HCC)    Morbidly obese, BMI 64.7        Impression:  1. Nonischemic cardiomyopathy/chronic systolic heart failure, with normal functioning ICD. Last EF 25% by echo in Feb 2019. On carvedilol and Entresto.  2. One episode nonsustained ventricular tachycardia noted on monitoring.  Asymptomatic.  No therapy is required.  3. 3.  Normal functioning ICD  4. 4.  Morbid obesity    Plan:  1. Repeat echocardiogram to reassess EF. Last EF Feb 2019 was 25%  2. Continue current medications.  3. Revisit in 6 MO with device check, or sooner as needed.    Scribed for Matt Hernandez MD by Ashley Suárez.  2/3/2020  3:24 PM    Matt Hernandez MD      Please note that portions of this note may have been completed with a voice recognition program. Efforts were made to edit the dictations, but occasionally words are mistranscribed.

## 2020-02-25 ENCOUNTER — OFFICE VISIT (OUTPATIENT)
Dept: CARDIOLOGY | Facility: CLINIC | Age: 50
End: 2020-02-25

## 2020-02-25 ENCOUNTER — HOSPITAL ENCOUNTER (OUTPATIENT)
Dept: CARDIOLOGY | Facility: HOSPITAL | Age: 50
Discharge: HOME | End: 2020-02-25
Admitting: INTERNAL MEDICINE

## 2020-02-25 VITALS
HEIGHT: 65 IN | SYSTOLIC BLOOD PRESSURE: 114 MMHG | OXYGEN SATURATION: 98 % | WEIGHT: 315 LBS | HEART RATE: 75 BPM | BODY MASS INDEX: 52.48 KG/M2 | DIASTOLIC BLOOD PRESSURE: 70 MMHG

## 2020-02-25 VITALS — WEIGHT: 315 LBS | BODY MASS INDEX: 52.48 KG/M2 | HEIGHT: 65 IN

## 2020-02-25 DIAGNOSIS — I42.0 DILATED CARDIOMYOPATHY (HCC): ICD-10-CM

## 2020-02-25 DIAGNOSIS — I50.22 CHRONIC SYSTOLIC CONGESTIVE HEART FAILURE (HCC): ICD-10-CM

## 2020-02-25 DIAGNOSIS — I50.22 CHRONIC SYSTOLIC CONGESTIVE HEART FAILURE (HCC): Primary | ICD-10-CM

## 2020-02-25 PROCEDURE — 93306 TTE W/DOPPLER COMPLETE: CPT

## 2020-02-25 PROCEDURE — 93282 PRGRMG EVAL IMPLANTABLE DFB: CPT | Performed by: INTERNAL MEDICINE

## 2020-02-25 PROCEDURE — 99213 OFFICE O/P EST LOW 20 MIN: CPT | Performed by: INTERNAL MEDICINE

## 2020-02-25 PROCEDURE — 93306 TTE W/DOPPLER COMPLETE: CPT | Performed by: INTERNAL MEDICINE

## 2020-02-25 PROCEDURE — 25010000002 SULFUR HEXAFLUORIDE MICROSPH 60.7-25 MG RECONSTITUTED SUSPENSION: Performed by: INTERNAL MEDICINE

## 2020-02-25 RX ADMIN — SULFUR HEXAFLUORIDE 2 ML: KIT at 14:00

## 2020-02-25 NOTE — PROGRESS NOTES
Matt Bueno  1970  PCP: Stas Rust MD    SUBJECTIVE:   Matt Bueno is a 49 y.o. male seen for a follow up visit regarding the following:     Chief Complaint: Follow up for NICM, ICD     HPI:    Since last visit the patient's status has been stable since device implant. Stable SOB and WONG. Continues to gain wt    History:  Patient is a 49 year old male with a history of morbid obesity and chronic systolic heart failure that presents today for evaluation for ICD. His EF was initially found to be low in Oct 2018. He then underwent LHC that showed normal coronaries. He was placed on optimal rx and echo was rechecked in Feb 2019 with no improvement and it remained at 25%. He is symptomatic with fatigue and WONG when he climbs stairs. No CP, edema, palpitations.         Cardiac PMH: (Old records have been reviewed and summarized below)     PROBLEM LIST:  1. Cardiomyopathy, nonischemic  a.  ECHO EF 25% with RVSP 57 mmHg. Mild MR>  b. 11/16/18 cardiac catheterization with normal coronary arteries. Dilated cardiomyopathy with EF of 15%.  Significantly elevated LVEDP.  c. 2/20/19 echo EF 25%. Mild to moderate MR.  d. S/p BSC VVI ICD 4/1/19  2. Morbid obesity - Peak wt 500 pounds  3. Sleep apnea on CPAP  4. Incomplete LBBB  5. Surgeries:  a. Gastric sleeve  b. Left knee arthroscopy  c. Bilateral shoulder surgery          Current Outpatient Medications:   •  albuterol (PROVENTIL HFA;VENTOLIN HFA) 108 (90 Base) MCG/ACT inhaler, 1-2 puffs q 4-6 hours PRN (Patient taking differently: Inhale 2 puffs Every 4 (Four) Hours As Needed for Shortness of Air. 1-2 puffs q 4-6 hours PRN), Disp: 1 inhaler, Rfl: 5  •  carvedilol (COREG) 12.5 MG tablet, TAKE 1 TABLET BY MOUTH TWICE A DAY WITH MEALS, Disp: 180 tablet, Rfl: 3  •  furosemide (LASIX) 40 MG tablet, Take 1 tablet by mouth Daily. (Patient taking differently: Take 40 mg by mouth As Needed.), Disp: 30 tablet, Rfl: 11  •  HYDROcodone-acetaminophen  (NORCO)  MG per tablet, TAKE 1 TABLET EVERY 6 HOURS prn, Disp: , Rfl: 0  •  sacubitril-valsartan (ENTRESTO)  MG tablet, Take 1 tablet by mouth 2 (Two) Times a Day., Disp: 60 tablet, Rfl: 11  •  traMADol (ULTRAM) 50 MG tablet, Take 50 mg by mouth 2 (Two) Times a Day., Disp: , Rfl: 1  No current facility-administered medications for this visit.     Past Medical History, Past Surgical History, Family history, Social History, and Medications were all reviewed with the patient today and updated as necessary.       Patient Active Problem List   Diagnosis   • LBP (low back pain)   • Adiposity   • POOJA on CPAP   • Screen for colon cancer   • Morbidly obese (CMS/HCC)   • Cardiomyopathy (CMS/HCC)   • Dilated cardiomyopathy (CMS/HCC)   • Chronic systolic congestive heart failure (CMS/HCC)     No Known Allergies  Past Medical History:   Diagnosis Date   • Back pain     WHOLE BACK - LEFT LEG PAIN    • Bilateral arm pain     FROM NECK ISSUES    • CHF (congestive heart failure) (CMS/HCC)    • Enlarged heart    • GERD (gastroesophageal reflux disease)    • Hypertension    • Left leg pain     FROM BACK ISSUES   • Neck pain     BILAT ARM PAIN    • POOJA on CPAP     compliant with machine    • Sciatic nerve pain    • Sleep apnea    • SOBOE (shortness of breath on exertion)    • Wears glasses     readers     Past Surgical History:   Procedure Laterality Date   • CARDIAC CATHETERIZATION N/A 11/16/2018    Procedure: Left Heart Cath;  Surgeon: Matt Hernandez MD;  Location:  [facility];  Service: Cardiovascular   • CARDIAC ELECTROPHYSIOLOGY PROCEDURE N/A 4/1/2019    Procedure: Device Implant- VDI ICD Biotronik;  Surgeon: Romeo Day MD;  Location:  [facility];  Service: Cardiology   • ENDOSCOPY     • GASTRIC SLEEVE LAPAROSCOPIC  2013   • KNEE ARTHROSCOPY Left    • SHOULDER ARTHROSCOPY Bilateral     both shoulder in the past   • WISDOM TOOTH EXTRACTION      aLL 4 REMOVED      Family  "History   Problem Relation Age of Onset   • Colon cancer Mother    • Coronary artery disease Father    • Heart disease Father      Social History     Tobacco Use   • Smoking status: Never Smoker   • Smokeless tobacco: Never Used   Substance Use Topics   • Alcohol use: Yes     Comment: twice a year         PHYSICAL EXAM:    /70 (BP Location: Left arm, Patient Position: Sitting)   Pulse 75   Ht 165.1 cm (65\")   Wt (!) 178 kg (392 lb)   SpO2 98%   BMI 65.23 kg/m²        Wt Readings from Last 5 Encounters:   02/25/20 (!) 178 kg (392 lb)   02/25/20 (!) 176 kg (389 lb)   02/03/20 (!) 176 kg (389 lb)   07/23/19 (!) 168 kg (370 lb)   04/16/19 (!) 163 kg (360 lb)       BP Readings from Last 5 Encounters:   02/25/20 114/70   02/03/20 112/70   07/23/19 114/72   04/16/19 112/74   04/08/19 124/74       General-Well Nourished, Well developed  Eyes - PERRLA  Neck- supple, No mass  CV- regular rate and rhythm, no MRG, No edema  Lung- clear bilaterally  Abd- obese, soft, +BS  Musc/skel - Norm strength and range of motion  Skin- warm and dry  Neuro - Alert & Oriented x 3, appropriate mood.        Medical problems and test results were reviewed with the patient today.     Recent Results (from the past 672 hour(s))   Adult Transthoracic Echo Complete W/ Cont if Necessary Per Protocol    Collection Time: 02/25/20  2:21 PM   Result Value Ref Range    BSA 2.6 m^2    IVSd 0.9 cm    LVIDd 6.9 cm    LVIDs 5.8 cm    LVPWd 1.0 cm    IVS/LVPW 0.9     FS 15.9 %    EDV(Teich) 247.3 ml    ESV(Teich) 166.6 ml    EF(Teich) 32.6 %    EDV(cubed) 328.5 ml    ESV(cubed) 195.1 ml    EF(cubed) 40.6 %    LV mass(C)d 294.3 grams    LV mass(C)dI 112.1 grams/m^2    SV(Teich) 80.7 ml    SI(Teich) 30.8 ml/m^2    SV(cubed) 133.4 ml    SI(cubed) 50.8 ml/m^2    MV Diam 3.6 cm    Ao root diam 3.2 cm    Ao root area 8.0 cm^2    LA dimension 4.7 cm    LA/Ao 1.5     LVOT diam 2.2 cm    LVOT area 3.8 cm^2    LVOT area(traced) 3.8 cm^2    LAd major 6.2 cm    " LVLd ap4 8.8 cm    EDV(MOD-sp4) 252.0 ml    LVLs ap4 8.0 cm    ESV(MOD-sp4) 167.0 ml    EF(MOD-sp4) 33.7 %    LVLd ap2 9.0 cm    EDV(MOD-sp2) 181.0 ml    LVLs ap2 7.7 cm    ESV(MOD-sp2) 107.0 ml    EF(MOD-sp2) 40.9 %    LA volume 76.8 ml    EF(MOD-bp) 36.6 %    SV(MOD-sp4) 85.0 ml    SI(MOD-sp4) 32.4 ml/m^2    SV(MOD-sp2) 74.0 ml    SI(MOD-sp2) 28.2 ml/m^2    Ao root area (BSA corrected) 1.2     LV [place] Vol (BSA corrected) 96.0 ml/m^2    LV Sys Vol (BSA corrected) 63.6 ml/m^2    LA Volume Index 29.3 ml/m^2    MV E max erickson 78.2 cm/sec    MV A max erickson 85.3 cm/sec    MV E/A 0.92     MV V2 max 85.9 cm/sec    MV max PG 3.0 mmHg    MV V2 mean 66.8 cm/sec    MV mean PG 2.0 mmHg    MV V2 VTI 23.5 cm    MV area (1 diam) 10.2 cm^2    MVA(VTI) 2.2 cm^2    MV Flow area(1diam) 10.2 cm^2    MV dec time 0.24 sec    Ao pk erickson 117.0 cm/sec    Ao max PG 5.0 mmHg    Ao max PG (full) 3.5 mmHg    Ao V2 mean 88.4 cm/sec    Ao mean PG 4.0 mmHg    Ao mean PG (full) 3.0 mmHg    Ao V2 VTI 25.7 cm    VIKI(I,A) 2.0 cm^2    VIKI(I,D) 2.0 cm^2    VIKI(V,A) 2.0 cm^2    VIKI(V,D) 2.0 cm^2    LV V1 max PG 1.5 mmHg    LV V1 mean PG 1.0 mmHg    LV V1 max 60.9 cm/sec    LV V1 mean 42.7 cm/sec    LV V1 VTI 13.5 cm    MR PISA 4.0 cm^2    MR flow rate 123.9 cm^3/sec    MR PISA radius 0.8 cm    MR alias erickson 30.8 cm/sec    SV(MV 1 diam) 239.2 ml    SI(MV 1 diam) 91.1 ml/m^2    SV(Ao) 206.7 ml    SI(Ao) 78.8 ml/m^2    SV(LVOT) 51.3 ml    SI(LVOT) 19.6 ml/m^2    PA V2 max 76.7 cm/sec    PA max PG 2.4 mmHg    PA acc time 0.18 sec    PA pr(Accel) -3.4 mmHg     CV ECHO MINDA - RF(MV,AO)(1 DIAM) 0.14     RF(MV,LVOT)(1diam) 0.79     Lat E/e'  11.8     Med E/e' 10.3     Lat Peak E' Erickson 6.6 cm/sec    Med Peak E' Erickson 7.6 cm/sec     CV ECHO MINDA - BZI_BMI 64.7 kilograms/m^2     CV ECHO MINDA - BSA(LowellCOCK) 3.0 m^2     CV ECHO MINDA - BZI_METRIC_WEIGHT 176.5 kg     CV ECHO MINDA - BZI_METRIC_HEIGHT 165.1 cm    RAP systole 8.0 mmHg    Avg E/e' ratio 11.01      CV  VAS BP LEFT /68 mmHg    TDI S' 12.90 cm/sec    RV Base 4.10 cm    RV Length 8.20 cm    RV Mid 3.60 cm    MV vena contracta 0.55 cm    PISA ALIASING COREEN 30.80 m/s    Radius 0.8 cm    TAPSE (>1.6) 1.82 cm2         EKG: (EKG has been independently visualized by me and summarized below)    Procedures     ASSESSMENT and PLAN    1. Chronic Systolic Heart Failure- nonischemic. Well compensated w/ FC II HF symptoms. Continue Entresto and Coreg.   2. VVI ICD with normal function on device interrogation. No events.    3. HTN- controlled. Continue Coreg and Entresto.        Return if symptoms worsen or fail to improve.        Romeo Day M.D., F.A.C.C, F.H.R.S.  Cardiology/Electrophysiology  2/25/2020  2:49 PM

## 2020-02-29 LAB
BH CV ECHO MEAS - AO MAX PG (FULL): 3.5 MMHG
BH CV ECHO MEAS - AO MAX PG: 5 MMHG
BH CV ECHO MEAS - AO MEAN PG (FULL): 3 MMHG
BH CV ECHO MEAS - AO MEAN PG: 4 MMHG
BH CV ECHO MEAS - AO ROOT AREA (BSA CORRECTED): 1.2
BH CV ECHO MEAS - AO ROOT AREA: 8 CM^2
BH CV ECHO MEAS - AO ROOT DIAM: 3.2 CM
BH CV ECHO MEAS - AO V2 MAX: 117 CM/SEC
BH CV ECHO MEAS - AO V2 MEAN: 88.4 CM/SEC
BH CV ECHO MEAS - AO V2 VTI: 25.7 CM
BH CV ECHO MEAS - AVA(I,A): 2 CM^2
BH CV ECHO MEAS - AVA(I,D): 2 CM^2
BH CV ECHO MEAS - AVA(V,A): 2 CM^2
BH CV ECHO MEAS - AVA(V,D): 2 CM^2
BH CV ECHO MEAS - BSA(HAYCOCK): 3 M^2
BH CV ECHO MEAS - BSA: 2.6 M^2
BH CV ECHO MEAS - BZI_BMI: 64.7 KILOGRAMS/M^2
BH CV ECHO MEAS - BZI_METRIC_HEIGHT: 165.1 CM
BH CV ECHO MEAS - BZI_METRIC_WEIGHT: 176.5 KG
BH CV ECHO MEAS - EDV(CUBED): 328.5 ML
BH CV ECHO MEAS - EDV(MOD-SP2): 181 ML
BH CV ECHO MEAS - EDV(MOD-SP4): 252 ML
BH CV ECHO MEAS - EDV(TEICH): 247.3 ML
BH CV ECHO MEAS - EF(CUBED): 40.6 %
BH CV ECHO MEAS - EF(MOD-BP): 33 %
BH CV ECHO MEAS - EF(MOD-SP2): 40.9 %
BH CV ECHO MEAS - EF(MOD-SP4): 33.7 %
BH CV ECHO MEAS - EF(TEICH): 32.6 %
BH CV ECHO MEAS - ESV(CUBED): 195.1 ML
BH CV ECHO MEAS - ESV(MOD-SP2): 107 ML
BH CV ECHO MEAS - ESV(MOD-SP4): 167 ML
BH CV ECHO MEAS - ESV(TEICH): 166.6 ML
BH CV ECHO MEAS - FS: 15.9 %
BH CV ECHO MEAS - IVS/LVPW: 0.9
BH CV ECHO MEAS - IVSD: 0.9 CM
BH CV ECHO MEAS - LA DIMENSION: 4.7 CM
BH CV ECHO MEAS - LA/AO: 1.5
BH CV ECHO MEAS - LAD MAJOR: 6.2 CM
BH CV ECHO MEAS - LAT PEAK E' VEL: 6.6 CM/SEC
BH CV ECHO MEAS - LATERAL E/E' RATIO: 11.8
BH CV ECHO MEAS - LV DIASTOLIC VOL/BSA (35-75): 96 ML/M^2
BH CV ECHO MEAS - LV MASS(C)D: 294.3 GRAMS
BH CV ECHO MEAS - LV MASS(C)DI: 112.1 GRAMS/M^2
BH CV ECHO MEAS - LV MAX PG: 1.5 MMHG
BH CV ECHO MEAS - LV MEAN PG: 1 MMHG
BH CV ECHO MEAS - LV SYSTOLIC VOL/BSA (12-30): 63.6 ML/M^2
BH CV ECHO MEAS - LV V1 MAX: 60.9 CM/SEC
BH CV ECHO MEAS - LV V1 MEAN: 42.7 CM/SEC
BH CV ECHO MEAS - LV V1 VTI: 13.5 CM
BH CV ECHO MEAS - LVIDD: 6.9 CM
BH CV ECHO MEAS - LVIDS: 5.8 CM
BH CV ECHO MEAS - LVLD AP2: 9 CM
BH CV ECHO MEAS - LVLD AP4: 8.8 CM
BH CV ECHO MEAS - LVLS AP2: 7.7 CM
BH CV ECHO MEAS - LVLS AP4: 8 CM
BH CV ECHO MEAS - LVOT AREA (M): 3.8 CM^2
BH CV ECHO MEAS - LVOT AREA: 3.8 CM^2
BH CV ECHO MEAS - LVOT DIAM: 2.2 CM
BH CV ECHO MEAS - LVPWD: 1 CM
BH CV ECHO MEAS - MED PEAK E' VEL: 7.6 CM/SEC
BH CV ECHO MEAS - MEDIAL E/E' RATIO: 10.3
BH CV ECHO MEAS - MR ALIAS VEL: 30.8 CM/SEC
BH CV ECHO MEAS - MR FLOW RATE: 123.9 CM^3/SEC
BH CV ECHO MEAS - MR PISA RADIUS: 0.8 CM
BH CV ECHO MEAS - MR PISA: 4 CM^2
BH CV ECHO MEAS - MV A MAX VEL: 85.3 CM/SEC
BH CV ECHO MEAS - MV AREA (1 DIAM): 10.2 CM^2
BH CV ECHO MEAS - MV DEC TIME: 0.24 SEC
BH CV ECHO MEAS - MV DIAM: 3.6 CM
BH CV ECHO MEAS - MV E MAX VEL: 78.2 CM/SEC
BH CV ECHO MEAS - MV E/A: 0.92
BH CV ECHO MEAS - MV FLOW AREA(1DIAM): 10.2 CM^2
BH CV ECHO MEAS - MV MAX PG: 3 MMHG
BH CV ECHO MEAS - MV MEAN PG: 2 MMHG
BH CV ECHO MEAS - MV V2 MAX: 85.9 CM/SEC
BH CV ECHO MEAS - MV V2 MEAN: 66.8 CM/SEC
BH CV ECHO MEAS - MV V2 VTI: 23.5 CM
BH CV ECHO MEAS - MVA(VTI): 2.2 CM^2
BH CV ECHO MEAS - PA ACC TIME: 0.18 SEC
BH CV ECHO MEAS - PA MAX PG: 2.4 MMHG
BH CV ECHO MEAS - PA PR(ACCEL): -3.4 MMHG
BH CV ECHO MEAS - PA V2 MAX: 76.7 CM/SEC
BH CV ECHO MEAS - RAP SYSTOLE: 8 MMHG
BH CV ECHO MEAS - RF(MV,AO)(1 DIAM): 0.14
BH CV ECHO MEAS - RF(MV,LVOT)(1DIAM): 0.79
BH CV ECHO MEAS - SI(AO): 78.8 ML/M^2
BH CV ECHO MEAS - SI(CUBED): 50.8 ML/M^2
BH CV ECHO MEAS - SI(LVOT): 19.6 ML/M^2
BH CV ECHO MEAS - SI(MOD-SP2): 28.2 ML/M^2
BH CV ECHO MEAS - SI(MOD-SP4): 32.4 ML/M^2
BH CV ECHO MEAS - SI(MV 1 DIAM): 91.1 ML/M^2
BH CV ECHO MEAS - SI(TEICH): 30.8 ML/M^2
BH CV ECHO MEAS - SV(AO): 206.7 ML
BH CV ECHO MEAS - SV(CUBED): 133.4 ML
BH CV ECHO MEAS - SV(LVOT): 51.3 ML
BH CV ECHO MEAS - SV(MOD-SP2): 74 ML
BH CV ECHO MEAS - SV(MOD-SP4): 85 ML
BH CV ECHO MEAS - SV(MV 1 DIAM): 239.2 ML
BH CV ECHO MEAS - SV(TEICH): 80.7 ML
BH CV ECHO MEAS - TAPSE (>1.6): 1.82 CM2
BH CV ECHO MEASUREMENTS AVERAGE E/E' RATIO: 11.01
BH CV VAS BP LEFT ARM: NORMAL MMHG
BH CV XLRA - RV BASE: 4.1 CM
BH CV XLRA - RV LENGTH: 8.2 CM
BH CV XLRA - RV MID: 3.6 CM
BH CV XLRA - TDI S': 12.9 CM/SEC
LEFT ATRIUM VOLUME INDEX: 29.3 ML/M^2
LEFT ATRIUM VOLUME: 76.8 ML
LV EF 2D ECHO EST: 30 %
MV VENA CONTRACTA: 0.55 CM
PISA ALIASING VEL: 30.8 M/S
PISA RADIUS: 0.8 CM

## 2020-03-02 ENCOUNTER — TELEPHONE (OUTPATIENT)
Dept: CARDIOLOGY | Facility: CLINIC | Age: 50
End: 2020-03-02

## 2020-03-02 NOTE — TELEPHONE ENCOUNTER
Spoke with patient, advised of echo results.     ----- Message from Matt Hernandez MD sent at 3/1/2020  8:34 PM EST -----  No change

## 2020-03-26 ENCOUNTER — CLINICAL SUPPORT NO REQUIREMENTS (OUTPATIENT)
Dept: CARDIOLOGY | Facility: CLINIC | Age: 50
End: 2020-03-26

## 2020-03-26 DIAGNOSIS — I42.0 DILATED CARDIOMYOPATHY (HCC): ICD-10-CM

## 2020-03-26 PROCEDURE — 93295 DEV INTERROG REMOTE 1/2/MLT: CPT | Performed by: INTERNAL MEDICINE

## 2020-03-26 PROCEDURE — 93296 REM INTERROG EVL PM/IDS: CPT | Performed by: INTERNAL MEDICINE

## 2020-08-04 RX ORDER — CARVEDILOL 12.5 MG/1
12.5 TABLET ORAL 2 TIMES DAILY WITH MEALS
Qty: 180 TABLET | Refills: 3 | Status: SHIPPED | OUTPATIENT
Start: 2020-08-04 | End: 2021-05-24

## 2020-08-10 RX ORDER — CARVEDILOL 12.5 MG/1
12.5 TABLET ORAL 2 TIMES DAILY WITH MEALS
Qty: 180 TABLET | Refills: 3 | OUTPATIENT
Start: 2020-08-10

## 2020-09-28 ENCOUNTER — OFFICE VISIT (OUTPATIENT)
Dept: CARDIOLOGY | Facility: CLINIC | Age: 50
End: 2020-09-28

## 2020-09-28 VITALS
WEIGHT: 315 LBS | DIASTOLIC BLOOD PRESSURE: 70 MMHG | SYSTOLIC BLOOD PRESSURE: 122 MMHG | HEIGHT: 65 IN | TEMPERATURE: 96.8 F | OXYGEN SATURATION: 97 % | BODY MASS INDEX: 52.48 KG/M2 | HEART RATE: 68 BPM

## 2020-09-28 DIAGNOSIS — I50.22 CHRONIC SYSTOLIC CONGESTIVE HEART FAILURE (HCC): ICD-10-CM

## 2020-09-28 DIAGNOSIS — I42.0 DILATED CARDIOMYOPATHY (HCC): Primary | ICD-10-CM

## 2020-09-28 DIAGNOSIS — E66.01 MORBIDLY OBESE (HCC): ICD-10-CM

## 2020-09-28 PROCEDURE — 99213 OFFICE O/P EST LOW 20 MIN: CPT | Performed by: INTERNAL MEDICINE

## 2020-09-28 PROCEDURE — 93283 PRGRMG EVAL IMPLANTABLE DFB: CPT | Performed by: INTERNAL MEDICINE

## 2020-09-28 NOTE — PROGRESS NOTES
[facility] Cardiology  Subjective:     Encounter Date: 09/28/2020      Patient ID: Matt Bueno is a 49 y.o. male.    Chief Complaint: Cardiomyopathy      PROBLEM LIST:  1. Nonischemic cardiomyopathy:  a. Echocardiogram, 10/31/2018: EF 25%. Mild MR. RVSP 57 mmHg.  b. C, 11/16/2018: Normal coronary arteries. Dilated cardiomyopathy with EF 15%. Significantly elevated LVEDP.  c. Echocardiogram, 02/20/2019: EF 25%. Mild-to-moderate MR.  d. ICD placement, 04/01/2019, Dr. Day: Functional [employer] VVI ICD, model [employer] EL ICD VR, serial #[id number].  e. Echocardiogram, 02/03/20: EF 30%. Mild MR.  2. Morbid obesity:  a. Peak weight 500 lbs.  b. Current weight 389 lbs, BMI 64.7.  3. Sleep apnea on CPAP  4. Incomplete LBBB  5. Surgeries:  a. Gastric sleeve  b. Left knee arthroscopy  c. Bilateral shoulder surgery      History of Present Illness  Matt Bueno returns today for a 6 month follow up with a history of nonischemic cardiomyopathy. Since last visit, patient has been feeling well overall from a cardiovascular standpoint. Patient denies chest pain, palpitations, edema, dizziness, and syncope. His only complaint is that he will experience some shortness of breath when doing tasks such as walking up a hill. Patient has not had any ER visits, hospitalizations, surgeries, or new diagnoses since he was last seen. He has been staying busy at home by working in his garage, and doing yard work at his house.    No Known Allergies      Current Outpatient Medications:   •  carvedilol (COREG) 12.5 MG tablet, Take 1 tablet by mouth 2 (Two) Times a Day With Meals., Disp: 180 tablet, Rfl: 3  •  furosemide (LASIX) 40 MG tablet, Take 1 tablet by mouth Daily. (Patient taking differently: Take 40 mg by mouth As Needed.), Disp: 30 tablet, Rfl: 11  •  HYDROcodone-acetaminophen (NORCO)  MG per tablet, TAKE 1 TABLET EVERY 6 HOURS prn, Disp: , Rfl: 0  •  sacubitril-valsartan (ENTRESTO)   "MG tablet, Take 1 tablet by mouth 2 (Two) Times a Day., Disp: 60 tablet, Rfl: 3  •  traMADol (ULTRAM) 50 MG tablet, Take 50 mg by mouth 2 (Two) Times a Day., Disp: , Rfl: 1    The following portions of the patient's history were reviewed and updated as appropriate: allergies, current medications, past family history, past medical history, past social history, past surgical history and problem list.    Review of Systems   Constitution: Negative.   Cardiovascular: Negative for chest pain, dyspnea on exertion, leg swelling, palpitations and syncope.   Respiratory: Negative.  Negative for shortness of breath.    Hematologic/Lymphatic: Negative for bleeding problem. Does not bruise/bleed easily.   Skin: Negative for rash.   Musculoskeletal: Negative for muscle weakness and myalgias.   Gastrointestinal: Negative for heartburn, nausea and vomiting.   Neurological: Negative for dizziness, light-headedness, loss of balance and numbness.          Objective:     Vitals:    09/28/20 0918   BP: 122/70   BP Location: Right arm   Patient Position: Sitting   Pulse: 68   Temp: 96.8 °F (36 °C)   SpO2: 97%   Weight: (!) 171 kg (378 lb)   Height: 165.1 cm (65\")         Vitals signs reviewed.   Constitutional:       Appearance: Well-developed and not in distress.   Neck:      Thyroid: No thyromegaly.      Vascular: No carotid bruit or JVD.   Pulmonary:      Breath sounds: Normal breath sounds.   Cardiovascular:      Regular rhythm.      No gallop. No S3 and S4 gallop.   Edema:     Peripheral edema absent.   Abdominal:      General: Bowel sounds are normal.      Palpations: Abdomen is soft. There is no abdominal mass.      Tenderness: There is no abdominal tenderness.   Musculoskeletal:         General: No deformity.      Extremities: No clubbing present.  Skin:     General: Skin is warm and dry.      Findings: No rash.   Neurological:      Mental Status: Alert and oriented to person, place, and time.         Lab Review:  Lab Results "   Component Value Date    GLUCOSE 101 (H) 03/26/2019    BUN 14 03/26/2019    CREATININE 0.71 03/26/2019    EGFRIFNONA 117 03/26/2019    BCR 19.7 03/26/2019    K 4.5 03/26/2019    CO2 28.0 03/26/2019    CALCIUM 9.0 03/26/2019    ALBUMIN 4.14 03/26/2019    ALKPHOS 66 03/26/2019    AST 14 03/26/2019    ALT 14 03/26/2019     Lab Results   Component Value Date    CHOL 172 11/16/2018    TRIG 164 (H) 11/16/2018    HDL 44 11/16/2018     11/16/2018      Lab Results   Component Value Date    WBC 6.54 03/26/2019    RBC 4.99 03/26/2019    HGB 13.3 03/26/2019    HCT 40.9 03/26/2019    MCV 82.0 03/26/2019     03/26/2019     No results found for: TSH  Lab Results   Component Value Date    HGBA1C 6.00 (H) 11/16/2018        Procedures  Manual device interrogation, 09/28/2020: [employer] ICD   V-paced <1%.   Battery voltage: 12+ years  Events: NSVT  Device updates: none        Assessment:   Matt was seen today for cardiomyopathy.    Diagnoses and all orders for this visit:    Dilated cardiomyopathy (CMS/HCC)    Chronic systolic congestive heart failure (CMS/HCC)    Morbidly obese (CMS/HCC)        Impression:  1. Nonischemic cardiomyopathy/chronic systolic heart failure, with normal functioning ICD. Last EF 25% by echo in Feb 2019. On carvedilol and Entresto.  Currently euvolemic  2. Normal functioning ICD  3.  Morbid obesity, patient is on a diet plan for his obesity.     Plan:  1. Stable cardiac status.  2. Discussed weight loss/dietary changes  3. Continue current medications.  4. Revisit in 6 MO, or sooner as needed.    Scribed for Matt Hernandez MD by Tan Aguilar 9/28/2020  09:36 EDT    Matt Hernandez MD      Please note that portions of this note may have been completed with a voice recognition program. Efforts were made to edit the dictations, but occasionally words are mistranscribed.

## 2021-03-27 NOTE — PROGRESS NOTES
[facility] Cardiology  Subjective:     Encounter Date: 03/29/2021      Patient ID: Matt Bueno is a 50 y.o. male.    Chief Complaint: No chief complaint on file.      PROBLEM LIST:  1. Nonischemic cardiomyopathy:  a. Echocardiogram, 10/31/2018: EF 25%. Mild MR. RVSP 57 mmHg.  b. LHC, 11/16/2018: Normal coronary arteries. Dilated cardiomyopathy with EF 15%. Significantly elevated LVEDP.  c. Echocardiogram, 02/20/2019: EF 25%. Mild-to-moderate MR.  d. ICD placement, 04/01/2019, Dr. Day: [employer] VVI ICD, model Dynagen EL ICD VR, serial #[id number].  e. Echocardiogram, 02/03/20: EF 30%. Mild MR.  2. Morbid obesity:  a. Peak weight 500 lbs.  b. Current weight 389 lbs, BMI 64.7.  3. Sleep apnea on CPAP  4. Incomplete LBBB  5. Surgeries:  a. Gastric sleeve  b. Left knee arthroscopy  c. Bilateral shoulder surgery      History of Present Illness  Matt Bueno returns today for a 6 month follow up with a history of  cardiomyopathy, chronic systolic congestive heart failure and cardiac risk factors. Since last visit, he has been doing well overall from a cardiovascular standpoint. He has not needed to take his furosemide for bilateral lower extremity edema. He says the only time he experiences swelling is after driving for a long period of time. He tries to exercise regularly and has been remodeling his house. He has not received his COVID-19 immunizations, but plans to get them. Patient otherwise denies chest pain, shortness of breath, palpitations, dizziness, and syncope. Patient has had no interim ER visits, hospitalizations, serious illnesses, or injuries.        No Known Allergies      Current Outpatient Medications:   •  carvedilol (COREG) 12.5 MG tablet, Take 1 tablet by mouth 2 (Two) Times a Day With Meals., Disp: 180 tablet, Rfl: 3  •  furosemide (LASIX) 40 MG tablet, Take 1 tablet by mouth Daily. (Patient taking differently: Take 40 mg by mouth As Needed.), Disp:  30 tablet, Rfl: 11  •  HYDROcodone-acetaminophen (NORCO)  MG per tablet, TAKE 1 TABLET EVERY 6 HOURS prn, Disp: , Rfl: 0  •  sacubitril-valsartan (ENTRESTO)  MG tablet, Take 1 tablet by mouth 2 (Two) Times a Day., Disp: 60 tablet, Rfl: 3  •  traMADol (ULTRAM) 50 MG tablet, Take 50 mg by mouth 2 (Two) Times a Day., Disp: , Rfl: 1    The following portions of the patient's history were reviewed and updated as appropriate: allergies, current medications, past family history, past medical history, past social history, past surgical history and problem list.    Review of Systems   Constitutional: Negative.   Cardiovascular: Negative for chest pain, dyspnea on exertion, leg swelling, palpitations and syncope.   Respiratory: Negative.  Negative for shortness of breath.    Hematologic/Lymphatic: Negative for bleeding problem. Does not bruise/bleed easily.   Skin: Negative for rash.   Musculoskeletal: Negative for muscle weakness and myalgias.   Gastrointestinal: Negative for heartburn, nausea and vomiting.   Neurological: Negative for dizziness, light-headedness, loss of balance and numbness.          Objective:   There were no vitals filed for this visit.      Constitutional:       Appearance: Well-developed.   Neck:      Thyroid: No thyromegaly.      Vascular: No carotid bruit or JVD.   Pulmonary:      Breath sounds: Normal breath sounds.   Cardiovascular:      Regular rhythm.      No gallop. No S3 and S4 gallop.   Edema:     Peripheral edema absent.   Abdominal:      General: Bowel sounds are normal.      Palpations: Abdomen is soft. There is no abdominal mass.      Tenderness: There is no abdominal tenderness.   Skin:     General: Skin is warm and dry.      Findings: No rash.   Neurological:      Mental Status: Alert and oriented to person, place, and time.         Lab Review:            Procedures   Manual device interrogation, 03/29/2021: [employer] ICD DI50  V-paced <1%.   Battery voltage: 12  years  Events: No new events.  Device updates: 0          Assessment:   Diagnoses and all orders for this visit:    1. Dilated cardiomyopathy (CMS/HCC) (Primary)    2. Chronic systolic congestive heart failure (CMS/HCC)    3. Morbidly obese (CMS/HCC)        Impression:  1. Dilated cardiomyopathy. Stable and asymptomatic with normal functioning ICD. On Entresto and carvedilol.  Functional class I without diuretics  2. Chronic systolic congestive heart failure. Stable and asymptomatic with normal functioning ICD. On furosemide as needed basis only.  3. Morbid obesity, patient has been exercising more often.     Plan:  1. Stable cardiac status.  2. Continue current medications.  3. Revisit in  MO, or sooner as needed.    Scribed for Matt Hernandez MD by Sana Nolasco. 3/29/2021 10:21 EDT          Matt Hernandez MD      Please note that portions of this note may have been completed with a voice recognition program. Efforts were made to edit the dictations, but occasionally words are mistranscribed.

## 2021-03-29 ENCOUNTER — OFFICE VISIT (OUTPATIENT)
Dept: CARDIOLOGY | Facility: CLINIC | Age: 51
End: 2021-03-29

## 2021-03-29 VITALS
HEIGHT: 65 IN | WEIGHT: 315 LBS | TEMPERATURE: 97.2 F | DIASTOLIC BLOOD PRESSURE: 74 MMHG | SYSTOLIC BLOOD PRESSURE: 132 MMHG | BODY MASS INDEX: 52.48 KG/M2 | OXYGEN SATURATION: 98 % | HEART RATE: 70 BPM

## 2021-03-29 DIAGNOSIS — I50.22 CHRONIC SYSTOLIC CONGESTIVE HEART FAILURE (HCC): ICD-10-CM

## 2021-03-29 DIAGNOSIS — I42.0 DILATED CARDIOMYOPATHY (HCC): Primary | ICD-10-CM

## 2021-03-29 DIAGNOSIS — E66.01 MORBIDLY OBESE (HCC): ICD-10-CM

## 2021-03-29 PROCEDURE — 93283 PRGRMG EVAL IMPLANTABLE DFB: CPT | Performed by: INTERNAL MEDICINE

## 2021-03-29 PROCEDURE — 99213 OFFICE O/P EST LOW 20 MIN: CPT | Performed by: INTERNAL MEDICINE

## 2021-04-01 ENCOUNTER — TELEPHONE (OUTPATIENT)
Dept: FAMILY MEDICINE CLINIC | Facility: CLINIC | Age: 51
End: 2021-04-01

## 2021-04-01 NOTE — TELEPHONE ENCOUNTER
PATIENT IS NEEDING A PRESCRIPTION FOR A C PAP MASK TO BE SENT TO [email or web address]. PLEASE RETURN CALL [phone] FOR QUESTIONS

## 2021-04-02 NOTE — TELEPHONE ENCOUNTER
Pt has not been seen here in 2 years!  I did write for mask but please ask him to schedule follow up or let us know who he is seeing.

## 2021-04-04 PROCEDURE — 93295 DEV INTERROG REMOTE 1/2/MLT: CPT | Performed by: INTERNAL MEDICINE

## 2021-04-04 PROCEDURE — 93296 REM INTERROG EVL PM/IDS: CPT | Performed by: INTERNAL MEDICINE

## 2021-05-24 RX ORDER — CARVEDILOL 12.5 MG/1
TABLET ORAL
Qty: 180 TABLET | Refills: 3 | Status: SHIPPED | OUTPATIENT
Start: 2021-05-24 | End: 2022-07-22 | Stop reason: SDUPTHER

## 2021-07-04 PROCEDURE — 93296 REM INTERROG EVL PM/IDS: CPT | Performed by: INTERNAL MEDICINE

## 2021-07-04 PROCEDURE — 93295 DEV INTERROG REMOTE 1/2/MLT: CPT | Performed by: INTERNAL MEDICINE

## 2021-09-21 ENCOUNTER — OFFICE VISIT (OUTPATIENT)
Dept: FAMILY MEDICINE CLINIC | Facility: CLINIC | Age: 51
End: 2021-09-21

## 2021-09-21 ENCOUNTER — TELEPHONE (OUTPATIENT)
Dept: FAMILY MEDICINE CLINIC | Facility: CLINIC | Age: 51
End: 2021-09-21

## 2021-09-21 VITALS
WEIGHT: 315 LBS | HEART RATE: 70 BPM | OXYGEN SATURATION: 99 % | HEIGHT: 65 IN | SYSTOLIC BLOOD PRESSURE: 132 MMHG | RESPIRATION RATE: 14 BRPM | BODY MASS INDEX: 52.48 KG/M2 | DIASTOLIC BLOOD PRESSURE: 86 MMHG | TEMPERATURE: 97.3 F

## 2021-09-21 DIAGNOSIS — G47.33 OSA ON CPAP: Primary | ICD-10-CM

## 2021-09-21 DIAGNOSIS — Z99.89 OSA ON CPAP: Primary | ICD-10-CM

## 2021-09-21 DIAGNOSIS — I50.22 CHRONIC SYSTOLIC CONGESTIVE HEART FAILURE (HCC): ICD-10-CM

## 2021-09-21 PROCEDURE — 99213 OFFICE O/P EST LOW 20 MIN: CPT | Performed by: FAMILY MEDICINE

## 2021-09-21 NOTE — PROGRESS NOTES
Subjective   Matt Bueno is a 50 y.o. male.     History of Present Illness     He has been on CPAP  He uses his machine every night and he does feel more energetic with using it  Without the machine he is more tired  His last sleep study was 3-4 years ago  He simply needs new supplies for his CPAP      The following portions of the patient's history were reviewed and updated as appropriate: allergies, current medications, past family history, past medical history, past social history, past surgical history and problem list.    Review of Systems   Constitutional: Negative.        Objective   Physical Exam  Vitals and nursing note reviewed.   Constitutional:       General: He is not in acute distress.     Appearance: Normal appearance. He is well-developed.   Cardiovascular:      Rate and Rhythm: Normal rate and regular rhythm.      Heart sounds: Normal heart sounds.   Pulmonary:      Effort: Pulmonary effort is normal.      Breath sounds: Normal breath sounds.   Neurological:      Mental Status: He is alert and oriented to person, place, and time.   Psychiatric:         Mood and Affect: Mood normal.         Behavior: Behavior normal.         Thought Content: Thought content normal.         Judgment: Judgment normal.         Assessment/Plan   Diagnoses and all orders for this visit:    1. POOJA on CPAP (Primary)    2. Chronic systolic congestive heart failure (CMS/HCC)      Pt simply needs new CPAP supplies, he has machine that is working well.  Will write for this and encouraged continued and consistent use of machine

## 2021-10-03 PROCEDURE — 93296 REM INTERROG EVL PM/IDS: CPT | Performed by: INTERNAL MEDICINE

## 2021-10-03 PROCEDURE — 93295 DEV INTERROG REMOTE 1/2/MLT: CPT | Performed by: INTERNAL MEDICINE

## 2021-10-07 ENCOUNTER — TELEPHONE (OUTPATIENT)
Dept: OTHER | Facility: OTHER | Age: 51
End: 2021-10-07

## 2022-01-02 PROCEDURE — 93295 DEV INTERROG REMOTE 1/2/MLT: CPT | Performed by: INTERNAL MEDICINE

## 2022-01-02 PROCEDURE — 93296 REM INTERROG EVL PM/IDS: CPT | Performed by: INTERNAL MEDICINE

## 2022-04-03 PROCEDURE — 93295 DEV INTERROG REMOTE 1/2/MLT: CPT | Performed by: INTERNAL MEDICINE

## 2022-04-03 PROCEDURE — 93296 REM INTERROG EVL PM/IDS: CPT | Performed by: INTERNAL MEDICINE

## 2022-04-04 ENCOUNTER — OFFICE VISIT (OUTPATIENT)
Dept: CARDIOLOGY | Facility: CLINIC | Age: 52
End: 2022-04-04

## 2022-04-04 VITALS
HEIGHT: 65 IN | SYSTOLIC BLOOD PRESSURE: 106 MMHG | WEIGHT: 315 LBS | OXYGEN SATURATION: 97 % | DIASTOLIC BLOOD PRESSURE: 62 MMHG | BODY MASS INDEX: 52.48 KG/M2 | HEART RATE: 78 BPM

## 2022-04-04 DIAGNOSIS — I50.22 CHRONIC SYSTOLIC CONGESTIVE HEART FAILURE: Primary | ICD-10-CM

## 2022-04-04 DIAGNOSIS — E66.01 MORBIDLY OBESE: ICD-10-CM

## 2022-04-04 DIAGNOSIS — I42.0 DILATED CARDIOMYOPATHY: ICD-10-CM

## 2022-04-04 PROCEDURE — 99214 OFFICE O/P EST MOD 30 MIN: CPT | Performed by: NURSE PRACTITIONER

## 2022-04-04 PROCEDURE — 93282 PRGRMG EVAL IMPLANTABLE DFB: CPT | Performed by: NURSE PRACTITIONER

## 2022-04-04 RX ORDER — OMEPRAZOLE 20 MG/1
20 CAPSULE, DELAYED RELEASE ORAL DAILY
COMMUNITY

## 2022-04-04 RX ORDER — TRAMADOL HYDROCHLORIDE 50 MG/1
50 TABLET ORAL EVERY 12 HOURS
COMMUNITY
Start: 2022-03-20

## 2022-04-04 RX ORDER — HYDROCODONE BITARTRATE AND ACETAMINOPHEN 10; 325 MG/1; MG/1
1 TABLET ORAL EVERY 6 HOURS
COMMUNITY
Start: 2022-03-20

## 2022-04-04 NOTE — PROGRESS NOTES
Subjective:     Encounter Date:04/04/2022    Primary Care Physician: Stas Rust MD      Patient ID: Matt Bueno is a 51 y.o. male.    Chief Complaint:Cardiomyopathy    PROBLEM LIST:  1. Nonischemic cardiomyopathy:  a. Echocardiogram, 10/31/2018: EF 25%. Mild MR. RVSP 57 mmHg.  b. LHC, 11/16/2018: Normal coronary arteries. Dilated cardiomyopathy with EF 15%. Significantly elevated LVEDP.  c. Echocardiogram, 02/20/2019: EF 25%. Mild-to-moderate MR.  d. ICD placement, 04/01/2019, Dr. Day: [employer] VVI ICD, model Dynagen EL ICD VR, serial #[id number].  e. Echocardiogram, 02/03/20: EF 30%. Mild MR.  2. Morbid obesity:  a. Peak weight 500 lbs.  b. Current weight 377  3. Sleep apnea on CPAP  4. Incomplete LBBB  5. Surgeries:  a. Gastric sleeve  b. Left knee arthroscopy  c. Bilateral shoulder surgery        No Known Allergies      Current Outpatient Medications:   •  carvedilol (COREG) 12.5 MG tablet, TAKE 1 TABLET BY MOUTH TWICE A DAY WITH MEALS, Disp: 180 tablet, Rfl: 3  •  furosemide (LASIX) 40 MG tablet, Take 1 tablet by mouth Daily. (Patient taking differently: Take 40 mg by mouth As Needed.), Disp: 30 tablet, Rfl: 11  •  HYDROcodone-acetaminophen (NORCO)  MG per tablet, Take 1 tablet by mouth Every 6 (Six) Hours., Disp: , Rfl:   •  omeprazole (priLOSEC) 20 MG capsule, Take 20 mg by mouth Daily., Disp: , Rfl:   •  sacubitril-valsartan (ENTRESTO)  MG tablet, Take 1 tablet by mouth 2 (Two) Times a Day., Disp: 60 tablet, Rfl: 11  •  traMADol (ULTRAM) 50 MG tablet, Take 50 mg by mouth Every 12 (Twelve) Hours., Disp: , Rfl:         History of Present Illness    Patient is a 51-year-old  male who is being seen today for annual follow-up of nonischemic cardiomyopathy and device check.  Since last being seen he notes to overall be doing well.  Denies any chest pain, pressure, tightness.  Denies any increase in shortness of breath.  No reported syncope, near-syncope, or edema.  Notes  "that he has not needed very much Lasix and that he does well with his volume and sodium restriction.    The following portions of the patient's history were reviewed and updated as appropriate: allergies, current medications, past family history, past medical history, past social history, past surgical history and problem list.      Social History     Tobacco Use   • Smoking status: Never Smoker   • Smokeless tobacco: Never Used   Vaping Use   • Vaping Use: Never used   Substance Use Topics   • Alcohol use: Yes     Comment: rarely   • Drug use: No         Review of Systems   Constitutional: Positive for malaise/fatigue.   Cardiovascular: Positive for dyspnea on exertion. Negative for chest pain, leg swelling, palpitations and syncope.   Respiratory: Negative.  Negative for shortness of breath.    Hematologic/Lymphatic: Negative for bleeding problem. Does not bruise/bleed easily.   Skin: Negative for rash.   Musculoskeletal: Positive for arthritis, back pain, muscle cramps and myalgias. Negative for muscle weakness.   Gastrointestinal: Negative for heartburn, nausea and vomiting.   Neurological: Negative for dizziness, light-headedness, loss of balance and numbness.          Objective:   /62 (BP Location: Left arm, Patient Position: Sitting)   Pulse 78   Ht 165.1 cm (65\")   Wt (!) 171 kg (377 lb 3.2 oz)   SpO2 97%   BMI 62.77 kg/m²         Vitals reviewed.   Constitutional:       Appearance: Well-developed and not in distress.      Comments: Morbidly obese   Neck:      Vascular: No JVD.      Trachea: No tracheal deviation.   Pulmonary:      Effort: Pulmonary effort is normal.      Breath sounds: Normal breath sounds.   Cardiovascular:      Normal rate. Regular rhythm.   Edema:     Peripheral edema absent.   Abdominal:      General: Bowel sounds are normal.      Tenderness: There is no abdominal tenderness.   Musculoskeletal:         General: No deformity. Skin:     General: Skin is warm and dry. "   Neurological:      Mental Status: Alert and oriented to person, place, and time.         Procedures          Assessment:   Assessment/Plan      Diagnoses and all orders for this visit:    1. Chronic systolic congestive heart failure (HCC) (Primary), stable.  Taking Lasix as needed.  Compliant with volume and sodium restriction.  On Entresto.    2. Morbidly obese (HCC), notes will soon be attempting recurrent weight loss attempts.    3. Dilated cardiomyopathy (HCC), stable.  On beta-blocker.      Plan:  1. Continue current cardiac medications.  2. Routine labs with primary care  3. Follow-up in 6 months time with device check or sooner if needed.       Noy GANT     Dictated utilizing Dragon dictation

## 2022-07-22 RX ORDER — CARVEDILOL 12.5 MG/1
12.5 TABLET ORAL 2 TIMES DAILY WITH MEALS
Qty: 180 TABLET | Refills: 3 | Status: SHIPPED | OUTPATIENT
Start: 2022-07-22

## 2022-09-09 ENCOUNTER — OFFICE VISIT (OUTPATIENT)
Dept: FAMILY MEDICINE CLINIC | Facility: CLINIC | Age: 52
End: 2022-09-09

## 2022-09-09 VITALS
SYSTOLIC BLOOD PRESSURE: 124 MMHG | BODY MASS INDEX: 52.48 KG/M2 | HEART RATE: 74 BPM | RESPIRATION RATE: 17 BRPM | HEIGHT: 65 IN | WEIGHT: 315 LBS | DIASTOLIC BLOOD PRESSURE: 78 MMHG | TEMPERATURE: 97.3 F

## 2022-09-09 DIAGNOSIS — Z12.5 SCREENING FOR PROSTATE CANCER: ICD-10-CM

## 2022-09-09 DIAGNOSIS — R10.11 RIGHT UPPER QUADRANT ABDOMINAL PAIN: ICD-10-CM

## 2022-09-09 DIAGNOSIS — Z83.42 FAMILY HISTORY OF HYPERCHOLESTEROLEMIA: ICD-10-CM

## 2022-09-09 DIAGNOSIS — Z12.11 SCREEN FOR COLON CANCER: ICD-10-CM

## 2022-09-09 DIAGNOSIS — Z11.59 ENCOUNTER FOR HEPATITIS C SCREENING TEST FOR LOW RISK PATIENT: ICD-10-CM

## 2022-09-09 DIAGNOSIS — Z00.00 MEDICARE ANNUAL WELLNESS VISIT, INITIAL: Primary | ICD-10-CM

## 2022-09-09 PROCEDURE — 99212 OFFICE O/P EST SF 10 MIN: CPT | Performed by: FAMILY MEDICINE

## 2022-09-09 PROCEDURE — 1170F FXNL STATUS ASSESSED: CPT | Performed by: FAMILY MEDICINE

## 2022-09-09 PROCEDURE — G0439 PPPS, SUBSEQ VISIT: HCPCS | Performed by: FAMILY MEDICINE

## 2022-09-09 PROCEDURE — 1159F MED LIST DOCD IN RCRD: CPT | Performed by: FAMILY MEDICINE

## 2022-09-09 NOTE — PROGRESS NOTES
The ABCs of the Annual Wellness Visit  Subsequent Medicare Wellness Visit    Chief Complaint   Patient presents with   • Abdominal Pain      Subjective    History of Present Illness:  Matt Bueno is a 51 y.o. male who presents for a Subsequent Medicare Wellness Visit.    The following portions of the patient's history were reviewed and   updated as appropriate: allergies, current medications, past family history, past medical history, past social history, past surgical history and problem list.    He had been moving some bricks in the house  Then started to have pain along his right lower and posterior ribs  The pain was after he ate breakfast  The pain was worse with eating and he did throw up after he ate.  Pain lasted all day  Then eased up last PM      Compared to one year ago, the patient feels his physical   health is the same.    Compared to one year ago, the patient feels his mental   health is the same.    Recent Hospitalizations:  He was not admitted to the hospital during the last year.       Current Medical Providers:  Patient Care Team:  Stas Rust MD as PCP - General (Family Medicine)    Outpatient Medications Prior to Visit   Medication Sig Dispense Refill   • carvedilol (COREG) 12.5 MG tablet Take 1 tablet by mouth 2 (Two) Times a Day With Meals. 180 tablet 3   • furosemide (LASIX) 40 MG tablet Take 1 tablet by mouth Daily. (Patient taking differently: Take 40 mg by mouth As Needed.) 30 tablet 11   • HYDROcodone-acetaminophen (NORCO)  MG per tablet Take 1 tablet by mouth Every 6 (Six) Hours.     • omeprazole (priLOSEC) 20 MG capsule Take 20 mg by mouth Daily.     • sacubitril-valsartan (ENTRESTO)  MG tablet Take 1 tablet by mouth 2 (Two) Times a Day. 60 tablet 11   • traMADol (ULTRAM) 50 MG tablet Take 50 mg by mouth Every 12 (Twelve) Hours.       No facility-administered medications prior to visit.       Opioid medication/s are on active medication list.  and I have evaluated  "his active treatment plan and pain score trends (see table).  There were no vitals filed for this visit.  I have reviewed the chart for potential of high risk medication and harmful drug interactions in the elderly.                Patient Active Problem List   Diagnosis   • LBP (low back pain)   • Adiposity   • POOJA on CPAP   • Screen for colon cancer   • Morbidly obese (HCC)   • Cardiomyopathy (HCC)   • Dilated cardiomyopathy (HCC)   • Chronic systolic congestive heart failure (HCC)     Advance Care Planning  Advance Directive is not on file.  ACP discussion was held with the patient during this visit. Patient does not have an advance directive, information provided.    Review of Systems   Constitutional: Negative.    HENT: Negative.    Respiratory: Negative.    Cardiovascular: Negative.    Gastrointestinal: Positive for abdominal pain.   Psychiatric/Behavioral: Negative.         Objective    Vitals:    09/09/22 1349   BP: 124/78   Pulse: 74   Resp: 17   Temp: 97.3 °F (36.3 °C)   Weight: (!) 169 kg (373 lb)   Height: 165.1 cm (65\")     Estimated body mass index is 62.07 kg/m² as calculated from the following:    Height as of this encounter: 165.1 cm (65\").    Weight as of this encounter: 169 kg (373 lb).    Class 3 Severe Obesity (BMI >=40).        Does the patient have evidence of cognitive impairment? No    Physical Exam  Vitals and nursing note reviewed.   Constitutional:       General: He is not in acute distress.     Appearance: Normal appearance. He is well-developed.   HENT:      Head: Normocephalic and atraumatic.   Eyes:      Extraocular Movements: Extraocular movements intact.      Conjunctiva/sclera: Conjunctivae normal.   Cardiovascular:      Rate and Rhythm: Normal rate and regular rhythm.      Heart sounds: Normal heart sounds.   Pulmonary:      Effort: Pulmonary effort is normal.      Breath sounds: Normal breath sounds.   Abdominal:      General: Abdomen is flat. Bowel sounds are normal. There is no " distension.      Palpations: Abdomen is soft. There is no mass.      Tenderness: There is no abdominal tenderness. There is no guarding or rebound. Negative signs include Iverson's sign.   Neurological:      Mental Status: He is alert and oriented to person, place, and time.   Psychiatric:         Mood and Affect: Mood normal.         Behavior: Behavior normal.         Thought Content: Thought content normal.         Judgment: Judgment normal.                 HEALTH RISK ASSESSMENT    Smoking Status:  Social History     Tobacco Use   Smoking Status Never Smoker   Smokeless Tobacco Never Used     Alcohol Consumption:  Social History     Substance and Sexual Activity   Alcohol Use Yes    Comment: rarely     Fall Risk Screen:    STEADI Fall Risk Assessment was completed, and patient is at LOW risk for falls.Assessment completed on:9/9/2022    Depression Screening:  PHQ-2/PHQ-9 Depression Screening 9/9/2022   Retired Total Score -   Little Interest or Pleasure in Doing Things 0-->not at all   Feeling Down, Depressed or Hopeless 0-->not at all   PHQ-9: Brief Depression Severity Measure Score 0       Health Habits and Functional and Cognitive Screening:  Functional & Cognitive Status 9/9/2022   Do you have difficulty preparing food and eating? No   Do you have difficulty bathing yourself, getting dressed or grooming yourself? No   Do you have difficulty using the toilet? No   Do you have difficulty moving around from place to place? No   Do you have trouble with steps or getting out of a bed or a chair? No   Current Diet Well Balanced Diet   Dental Exam Up to date   Eye Exam Up to date   Exercise (times per week) 3 times per week   Current Exercises Include Walking   Do you need help using the phone?  No   Are you deaf or do you have serious difficulty hearing?  No   Do you need help with transportation? No   Do you need help shopping? No   Do you need help preparing meals?  No   Do you need help with housework?  No   Do  you need help with laundry? No   Do you need help taking your medications? No   Do you need help managing money? No   Do you ever drive or ride in a car without wearing a seat belt? No   Have you felt unusual stress, anger or loneliness in the last month? No   Who do you live with? Spouse   If you need help, do you have trouble finding someone available to you? No   Have you been bothered in the last four weeks by sexual problems? No   Do you have difficulty concentrating, remembering or making decisions? No       Age-appropriate Screening Schedule:  Refer to the list below for future screening recommendations based on patient's age, sex and/or medical conditions. Orders for these recommended tests are listed in the plan section. The patient has been provided with a written plan.    Health Maintenance   Topic Date Due   • TDAP/TD VACCINES (1 - Tdap) Never done   • ZOSTER VACCINE (1 of 2) Never done   • INFLUENZA VACCINE  10/01/2022              Assessment & Plan   CMS Preventative Services Quick Reference  Risk Factors Identified During Encounter  Chronic Pain   Depression/Dysphoria  Fall Risk-High or Moderate  Inactivity/Sedentary  Obesity/Overweight   The above risks/problems have been discussed with the patient.  Follow up actions/plans if indicated are seen below in the Assessment/Plan Section.  Pertinent information has been shared with the patient in the After Visit Summary.    Diagnoses and all orders for this visit:    1. Medicare annual wellness visit, initial (Primary)    2. Right upper quadrant abdominal pain  -     US Liver; Future    3. Screening for prostate cancer  -     PSA Screen    4. Screen for colon cancer  -     Cologuard - Stool, Per Rectum; Future    5. Encounter for hepatitis C screening test for low risk patient  -     Hepatitis C Antibody    6. Family history of hypercholesterolemia  -     CBC & Differential  -     Comprehensive Metabolic Panel  -     Lipid Panel      Medicare wellness  completed.   Will screen for Hep C and colon CA    With his story of postprandial secvere RUQ pain, I am concerned about GB disease.  Will check US and may need HIDA scan.  F/u pending US  Check lipids and f;u pending results    Follow Up:   No follow-ups on file.     An After Visit Summary and PPPS were made available to the patient.

## 2022-10-02 PROCEDURE — 93296 REM INTERROG EVL PM/IDS: CPT | Performed by: INTERNAL MEDICINE

## 2022-10-02 PROCEDURE — 93295 DEV INTERROG REMOTE 1/2/MLT: CPT | Performed by: INTERNAL MEDICINE

## 2022-11-21 ENCOUNTER — TELEPHONE (OUTPATIENT)
Dept: CARDIOLOGY | Facility: CLINIC | Age: 52
End: 2022-11-21

## 2022-11-21 NOTE — TELEPHONE ENCOUNTER
Patient contacted for APPRAISE study visit.  He is feeling well with no device issues.  He's had no ER visits, surgeries or hospitalizations since last visit and meds remain the same.  Today's visit with Dr. Hernandez was cancelled and he's waiting to hear from scheduling.

## 2023-01-01 PROCEDURE — 93296 REM INTERROG EVL PM/IDS: CPT | Performed by: INTERNAL MEDICINE

## 2023-01-01 PROCEDURE — 93295 DEV INTERROG REMOTE 1/2/MLT: CPT | Performed by: INTERNAL MEDICINE

## 2023-01-30 ENCOUNTER — OFFICE VISIT (OUTPATIENT)
Dept: CARDIOLOGY | Facility: CLINIC | Age: 53
End: 2023-01-30
Payer: MEDICARE

## 2023-01-30 VITALS
DIASTOLIC BLOOD PRESSURE: 86 MMHG | OXYGEN SATURATION: 98 % | HEART RATE: 91 BPM | SYSTOLIC BLOOD PRESSURE: 124 MMHG | BODY MASS INDEX: 52.48 KG/M2 | WEIGHT: 315 LBS | HEIGHT: 65 IN

## 2023-01-30 DIAGNOSIS — I50.22 CHRONIC SYSTOLIC CONGESTIVE HEART FAILURE: Primary | ICD-10-CM

## 2023-01-30 DIAGNOSIS — I42.0 DILATED CARDIOMYOPATHY: ICD-10-CM

## 2023-01-30 PROCEDURE — 93282 PRGRMG EVAL IMPLANTABLE DFB: CPT | Performed by: NURSE PRACTITIONER

## 2023-01-30 PROCEDURE — 99213 OFFICE O/P EST LOW 20 MIN: CPT | Performed by: NURSE PRACTITIONER

## 2023-01-30 NOTE — PROGRESS NOTES
Subjective:     Encounter Date:01/30/2023    Primary Care Physician: Stas Rust MD      Patient ID: Matt Bueno is a 52 y.o. male.    Chief Complaint:Chronic systolic congestive heart failure     PROBLEM LIST:  1. Nonischemic cardiomyopathy:  a. Echocardiogram, 10/31/2018: EF 25%. Mild MR. RVSP 57 mmHg.  b. LHC, 11/16/2018: Normal coronary arteries. Dilated cardiomyopathy with EF 15%. Significantly elevated LVEDP.  c. Echocardiogram, 02/20/2019: EF 25%. Mild-to-moderate MR.  d. ICD placement, 04/01/2019, Dr. Day: Syntervention VVI ICD, model Dynagen EL ICD VR, serial #[id number].  e. Echocardiogram, 02/03/20: EF 30%. Mild MR.  2. Morbid obesity:  a. Peak weight 500 lbs.  b. Current weight 377  3. Sleep apnea on CPAP  4. Incomplete LBBB  5. Surgeries:  a. Gastric sleeve  b. Left knee arthroscopy  c. Bilateral shoulder surgery  d. [place] teeth extracted      No Known Allergies      Current Outpatient Medications:   •  carvedilol (COREG) 12.5 MG tablet, Take 1 tablet by mouth 2 (Two) Times a Day With Meals., Disp: 180 tablet, Rfl: 3  •  furosemide (LASIX) 40 MG tablet, Take 1 tablet by mouth Daily. (Patient taking differently: Take 40 mg by mouth As Needed.), Disp: 30 tablet, Rfl: 11  •  HYDROcodone-acetaminophen (NORCO)  MG per tablet, Take 1 tablet by mouth Every 6 (Six) Hours., Disp: , Rfl:   •  omeprazole (priLOSEC) 20 MG capsule, Take 20 mg by mouth Daily., Disp: , Rfl:   •  sacubitril-valsartan (ENTRESTO)  MG tablet, Take 1 tablet by mouth 2 (Two) Times a Day., Disp: 60 tablet, Rfl: 11  •  traMADol (ULTRAM) 50 MG tablet, Take 50 mg by mouth Every 12 (Twelve) Hours., Disp: , Rfl:         History of Present Illness    Patient is a 52-year-old  male who we are seeing today for 6-month follow-up of nonischemic myopathy, chronic systolic heart failure, and device check.  Since last being seen he notes overall to be doing well.  He denies any chest pain, pressure, tightness.   "Denies any increasing shortness of breath.  No syncope, presyncope, or edema.  Notes he is compliant with his medications.    The following portions of the patient's history were reviewed and updated as appropriate: allergies, current medications, past family history, past medical history, past social history, past surgical history and problem list.      Social History     Tobacco Use   • Smoking status: Never   • Smokeless tobacco: Never   Vaping Use   • Vaping Use: Never used   Substance Use Topics   • Alcohol use: Yes     Comment: rarely   • Drug use: No         Review of Systems   Constitutional: Positive for malaise/fatigue.   Cardiovascular: Negative for chest pain, dyspnea on exertion, leg swelling, palpitations and syncope.   Respiratory: Negative.  Negative for shortness of breath.    Hematologic/Lymphatic: Negative for bleeding problem. Does not bruise/bleed easily.   Skin: Negative for rash.   Musculoskeletal: Positive for arthritis and joint pain. Negative for muscle weakness and myalgias.   Gastrointestinal: Negative for heartburn, nausea and vomiting.   Neurological: Negative for dizziness, light-headedness, loss of balance and numbness.          Objective:   /86 (BP Location: Left arm, Patient Position: Sitting)   Pulse 91   Ht 165.1 cm (65\")   Wt (!) 171 kg (377 lb 9.6 oz)   SpO2 98%   BMI 62.84 kg/m²         Vitals reviewed.   Constitutional:       Appearance: Well-developed and not in distress.   Neck:      Vascular: No JVD.      Trachea: No tracheal deviation.   Pulmonary:      Effort: Pulmonary effort is normal.      Breath sounds: Normal breath sounds.   Cardiovascular:      Normal rate. Regular rhythm.   Edema:     Peripheral edema absent.   Musculoskeletal:         General: No deformity. Skin:     General: Skin is warm and dry.   Neurological:      Mental Status: Alert and oriented to person, place, and time.         Procedures  Device check:  Normal functioning single-chamber ICD.  " No events.  No reprogramming.  12+ years on battery.        Assessment:   Assessment & Plan      Diagnoses and all orders for this visit:    1. Chronic systolic congestive heart failure (HCC) (Primary), stable.  No evidence of active heart failure.  On Lasix.    2. Dilated cardiomyopathy (HCC), stable.  Last EF 30%.  On Entresto and carvedilol.      Plan:  1. Patient overall stable from cardiac standpoint.  2. Continue current cardiac medications.  3. Follow-up in 6 months time with a device check or sooner if needed.       Noy GANT             Dictated utilizing Dragon dictation

## 2023-04-02 PROCEDURE — 93295 DEV INTERROG REMOTE 1/2/MLT: CPT | Performed by: INTERNAL MEDICINE

## 2023-04-02 PROCEDURE — 93296 REM INTERROG EVL PM/IDS: CPT | Performed by: INTERNAL MEDICINE

## 2023-09-11 ENCOUNTER — TELEPHONE (OUTPATIENT)
Dept: FAMILY MEDICINE CLINIC | Facility: CLINIC | Age: 53
End: 2023-09-11

## 2023-09-11 ENCOUNTER — OFFICE VISIT (OUTPATIENT)
Dept: FAMILY MEDICINE CLINIC | Facility: CLINIC | Age: 53
End: 2023-09-11
Payer: MEDICARE

## 2023-09-11 VITALS
OXYGEN SATURATION: 98 % | TEMPERATURE: 97.8 F | HEART RATE: 87 BPM | HEIGHT: 65 IN | BODY MASS INDEX: 52.48 KG/M2 | SYSTOLIC BLOOD PRESSURE: 128 MMHG | DIASTOLIC BLOOD PRESSURE: 70 MMHG | WEIGHT: 315 LBS | RESPIRATION RATE: 18 BRPM

## 2023-09-11 DIAGNOSIS — J40 BRONCHITIS: Primary | ICD-10-CM

## 2023-09-11 PROCEDURE — 99213 OFFICE O/P EST LOW 20 MIN: CPT | Performed by: FAMILY MEDICINE

## 2023-09-11 RX ORDER — DOXYCYCLINE 100 MG/1
100 TABLET ORAL 2 TIMES DAILY
Qty: 20 TABLET | Refills: 0 | Status: SHIPPED | OUTPATIENT
Start: 2023-09-11 | End: 2023-09-21

## 2023-09-11 RX ORDER — PREDNISONE 20 MG/1
TABLET ORAL
Qty: 16 TABLET | Refills: 0 | Status: SHIPPED | OUTPATIENT
Start: 2023-09-11

## 2023-09-11 NOTE — PROGRESS NOTES
Subjective   Matt Bueno is a 52 y.o. male.     History of Present Illness     He has been ill the past week  Wife has been ill the opast 4 weeks  Some congestion  He feels weak as well    The following portions of the patient's history were reviewed and updated as appropriate: allergies, current medications, past family history, past medical history, past social history, past surgical history, and problem list.    Review of Systems    Objective   Physical Exam  Vitals and nursing note reviewed.   Constitutional:       General: He is not in acute distress.     Appearance: Normal appearance. He is well-developed.   Cardiovascular:      Rate and Rhythm: Normal rate and regular rhythm.      Heart sounds: Normal heart sounds.   Pulmonary:      Effort: Pulmonary effort is normal.      Breath sounds: Wheezing present.   Neurological:      Mental Status: He is alert and oriented to person, place, and time.   Psychiatric:         Mood and Affect: Mood normal.         Behavior: Behavior normal.         Thought Content: Thought content normal.         Judgment: Judgment normal.       Assessment & Plan   Diagnoses and all orders for this visit:    1. Bronchitis (Primary)    Other orders  -     predniSONE (DELTASONE) 20 MG tablet; 3 po daily 2 days then 2 po daily 3 days then 1 po daily 3 days then 1/2 po daily 2 days  Dispense: 16 tablet; Refill: 0  -     doxycycline (ADOXA) 100 MG tablet; Take 1 tablet by mouth 2 (Two) Times a Day for 10 days.  Dispense: 20 tablet; Refill: 0    Pred taper and doxy as his wife has been ill for several weeks,    Pt to call back INB

## 2023-09-20 RX ORDER — CARVEDILOL 12.5 MG/1
12.5 TABLET ORAL 2 TIMES DAILY WITH MEALS
Qty: 180 TABLET | Refills: 3 | Status: SHIPPED | OUTPATIENT
Start: 2023-09-20

## 2023-09-29 ENCOUNTER — APPOINTMENT (OUTPATIENT)
Dept: CT IMAGING | Facility: HOSPITAL | Age: 53
End: 2023-09-29
Payer: MEDICARE

## 2023-09-29 ENCOUNTER — APPOINTMENT (OUTPATIENT)
Dept: ULTRASOUND IMAGING | Facility: HOSPITAL | Age: 53
End: 2023-09-29
Payer: MEDICARE

## 2023-09-29 ENCOUNTER — APPOINTMENT (OUTPATIENT)
Dept: CARDIOLOGY | Facility: HOSPITAL | Age: 53
End: 2023-09-29
Payer: MEDICARE

## 2023-09-29 ENCOUNTER — APPOINTMENT (OUTPATIENT)
Dept: GENERAL RADIOLOGY | Facility: HOSPITAL | Age: 53
End: 2023-09-29
Payer: MEDICARE

## 2023-09-29 ENCOUNTER — TELEPHONE (OUTPATIENT)
Dept: CARDIOLOGY | Facility: CLINIC | Age: 53
End: 2023-09-29
Payer: MEDICARE

## 2023-09-29 ENCOUNTER — HOSPITAL ENCOUNTER (OUTPATIENT)
Facility: HOSPITAL | Age: 53
Setting detail: OBSERVATION
Discharge: HOME HEALTH | End: 2023-09-30
Attending: EMERGENCY MEDICINE | Admitting: FAMILY MEDICINE
Payer: MEDICARE

## 2023-09-29 DIAGNOSIS — I42.0 DILATED CARDIOMYOPATHY: ICD-10-CM

## 2023-09-29 DIAGNOSIS — I63.9 ACUTE CVA (CEREBROVASCULAR ACCIDENT): ICD-10-CM

## 2023-09-29 DIAGNOSIS — I63.9 ISCHEMIC STROKE: Primary | ICD-10-CM

## 2023-09-29 PROBLEM — I50.22 CHRONIC SYSTOLIC CHF (CONGESTIVE HEART FAILURE): Status: ACTIVE | Noted: 2023-09-29

## 2023-09-29 PROBLEM — Z95.810 HISTORY OF CARDIAC DEFIBRILLATOR PLACEMENT: Status: ACTIVE | Noted: 2023-09-29

## 2023-09-29 LAB
ABO GROUP BLD: NORMAL
ABO GROUP BLD: NORMAL
ALBUMIN SERPL-MCNC: 4.2 G/DL (ref 3.5–5.2)
ALBUMIN/GLOB SERPL: 1.6 G/DL
ALP SERPL-CCNC: 53 U/L (ref 39–117)
ALT SERPL W P-5'-P-CCNC: 10 U/L (ref 1–41)
ANION GAP SERPL CALCULATED.3IONS-SCNC: 11 MMOL/L (ref 5–15)
AST SERPL-CCNC: 10 U/L (ref 1–40)
BASOPHILS # BLD AUTO: 0.04 10*3/MM3 (ref 0–0.2)
BASOPHILS NFR BLD AUTO: 0.4 % (ref 0–1.5)
BH CV ECHO MEAS - AO MAX PG: 6.3 MMHG
BH CV ECHO MEAS - AO MEAN PG: 4 MMHG
BH CV ECHO MEAS - AO ROOT DIAM: 2.25 CM
BH CV ECHO MEAS - AO V2 MAX: 125 CM/SEC
BH CV ECHO MEAS - AO V2 VTI: 21.1 CM
BH CV ECHO MEAS - AVA(I,D): 2.21 CM2
BH CV ECHO MEAS - EDV(MOD-SP2): 324 ML
BH CV ECHO MEAS - EDV(MOD-SP4): 340 ML
BH CV ECHO MEAS - EF(MOD-BP): 41 %
BH CV ECHO MEAS - EF(MOD-SP2): 41.4 %
BH CV ECHO MEAS - EF(MOD-SP4): 41.2 %
BH CV ECHO MEAS - ESV(MOD-SP2): 190 ML
BH CV ECHO MEAS - ESV(MOD-SP4): 200 ML
BH CV ECHO MEAS - LA DIMENSION: 5 CM
BH CV ECHO MEAS - LAT PEAK E' VEL: 9.1 CM/SEC
BH CV ECHO MEAS - LV MAX PG: 2.6 MMHG
BH CV ECHO MEAS - LV MEAN PG: 1 MMHG
BH CV ECHO MEAS - LV V1 MAX: 80.4 CM/SEC
BH CV ECHO MEAS - LV V1 VTI: 15.6 CM
BH CV ECHO MEAS - LVOT AREA: 3 CM2
BH CV ECHO MEAS - LVOT DIAM: 1.95 CM
BH CV ECHO MEAS - MED PEAK E' VEL: 6.5 CM/SEC
BH CV ECHO MEAS - MR MAX PG: 62.7 MMHG
BH CV ECHO MEAS - MR MAX VEL: 396 CM/SEC
BH CV ECHO MEAS - MV A MAX VEL: 42.1 CM/SEC
BH CV ECHO MEAS - MV DEC TIME: 0.19 SEC
BH CV ECHO MEAS - MV E MAX VEL: 98.5 CM/SEC
BH CV ECHO MEAS - MV E/A: 2.34
BH CV ECHO MEAS - MV MAX PG: 7.2 MMHG
BH CV ECHO MEAS - MV MEAN PG: 3 MMHG
BH CV ECHO MEAS - MV V2 VTI: 29.8 CM
BH CV ECHO MEAS - MVA(VTI): 1.56 CM2
BH CV ECHO MEAS - PA ACC TIME: 0.1 SEC
BH CV ECHO MEAS - PA V2 MAX: 105 CM/SEC
BH CV ECHO MEAS - RAP SYSTOLE: 8 MMHG
BH CV ECHO MEAS - RV MAX PG: 1.12 MMHG
BH CV ECHO MEAS - RV V1 MAX: 52.9 CM/SEC
BH CV ECHO MEAS - RV V1 VTI: 9.6 CM
BH CV ECHO MEAS - RVSP: 26.7 MMHG
BH CV ECHO MEAS - SV(LVOT): 46.6 ML
BH CV ECHO MEAS - SV(MOD-SP2): 134 ML
BH CV ECHO MEAS - SV(MOD-SP4): 140 ML
BH CV ECHO MEAS - TAPSE (>1.6): 2.38 CM
BH CV ECHO MEAS - TR MAX PG: 18.7 MMHG
BH CV ECHO MEAS - TR MAX VEL: 216 CM/SEC
BH CV ECHO MEASUREMENTS AVERAGE E/E' RATIO: 12.63
BH CV ECHO SHUNT ASSESSMENT PERFORMED (HIDDEN SCRIPTING): 1
BH CV XLRA - TDI S': 12.1 CM/SEC
BILIRUB SERPL-MCNC: 0.4 MG/DL (ref 0–1.2)
BLD GP AB SCN SERPL QL: NEGATIVE
BUN SERPL-MCNC: 13 MG/DL (ref 6–20)
BUN/CREAT SERPL: 21.7 (ref 7–25)
CALCIUM SPEC-SCNC: 8.7 MG/DL (ref 8.6–10.5)
CHLORIDE SERPL-SCNC: 104 MMOL/L (ref 98–107)
CO2 SERPL-SCNC: 25 MMOL/L (ref 22–29)
CREAT SERPL-MCNC: 0.6 MG/DL (ref 0.76–1.27)
DEPRECATED RDW RBC AUTO: 43.3 FL (ref 37–54)
EGFRCR SERPLBLD CKD-EPI 2021: 114.7 ML/MIN/1.73
EOSINOPHIL # BLD AUTO: 0.34 10*3/MM3 (ref 0–0.4)
EOSINOPHIL NFR BLD AUTO: 3.6 % (ref 0.3–6.2)
ERYTHROCYTE [DISTWIDTH] IN BLOOD BY AUTOMATED COUNT: 14 % (ref 12.3–15.4)
GLOBULIN UR ELPH-MCNC: 2.6 GM/DL
GLUCOSE SERPL-MCNC: 130 MG/DL (ref 65–99)
HCT VFR BLD AUTO: 35.5 % (ref 37.5–51)
HGB BLD-MCNC: 11.4 G/DL (ref 13–17.7)
HOLD SPECIMEN: NORMAL
HOLD SPECIMEN: NORMAL
IMM GRANULOCYTES # BLD AUTO: 0.03 10*3/MM3 (ref 0–0.05)
IMM GRANULOCYTES NFR BLD AUTO: 0.3 % (ref 0–0.5)
INR PPP: 0.94 (ref 0.9–1.1)
LEFT ATRIUM VOLUME INDEX: 33.3 ML/M2
LV EF 2D ECHO EST: 40 %
LYMPHOCYTES # BLD AUTO: 1.61 10*3/MM3 (ref 0.7–3.1)
LYMPHOCYTES NFR BLD AUTO: 17.1 % (ref 19.6–45.3)
MCH RBC QN AUTO: 27.1 PG (ref 26.6–33)
MCHC RBC AUTO-ENTMCNC: 32.1 G/DL (ref 31.5–35.7)
MCV RBC AUTO: 84.3 FL (ref 79–97)
MONOCYTES # BLD AUTO: 0.64 10*3/MM3 (ref 0.1–0.9)
MONOCYTES NFR BLD AUTO: 6.8 % (ref 5–12)
NEUTROPHILS NFR BLD AUTO: 6.75 10*3/MM3 (ref 1.7–7)
NEUTROPHILS NFR BLD AUTO: 71.8 % (ref 42.7–76)
NRBC BLD AUTO-RTO: 0 /100 WBC (ref 0–0.2)
PLATELET # BLD AUTO: 246 10*3/MM3 (ref 140–450)
PMV BLD AUTO: 10.3 FL (ref 6–12)
POTASSIUM SERPL-SCNC: 4.6 MMOL/L (ref 3.5–5.2)
PROT SERPL-MCNC: 6.8 G/DL (ref 6–8.5)
PROTHROMBIN TIME: 13.1 SECONDS (ref 12.3–15.1)
RBC # BLD AUTO: 4.21 10*6/MM3 (ref 4.14–5.8)
RH BLD: POSITIVE
RH BLD: POSITIVE
SODIUM SERPL-SCNC: 140 MMOL/L (ref 136–145)
T&S EXPIRATION DATE: NORMAL
WBC NRBC COR # BLD: 9.41 10*3/MM3 (ref 3.4–10.8)
WHOLE BLOOD HOLD COAG: NORMAL
WHOLE BLOOD HOLD SPECIMEN: NORMAL

## 2023-09-29 PROCEDURE — 36415 COLL VENOUS BLD VENIPUNCTURE: CPT

## 2023-09-29 PROCEDURE — G0378 HOSPITAL OBSERVATION PER HR: HCPCS

## 2023-09-29 PROCEDURE — 99285 EMERGENCY DEPT VISIT HI MDM: CPT

## 2023-09-29 PROCEDURE — 71045 X-RAY EXAM CHEST 1 VIEW: CPT

## 2023-09-29 PROCEDURE — 25010000002 SULFUR HEXAFLUORIDE MICROSPH 60.7-25 MG RECONSTITUTED SUSPENSION: Performed by: EMERGENCY MEDICINE

## 2023-09-29 PROCEDURE — 99205 OFFICE O/P NEW HI 60 MIN: CPT | Performed by: PSYCHIATRY & NEUROLOGY

## 2023-09-29 PROCEDURE — 25510000001 IOPAMIDOL 61 % SOLUTION: Performed by: EMERGENCY MEDICINE

## 2023-09-29 PROCEDURE — 80053 COMPREHEN METABOLIC PANEL: CPT | Performed by: EMERGENCY MEDICINE

## 2023-09-29 PROCEDURE — 86901 BLOOD TYPING SEROLOGIC RH(D): CPT | Performed by: EMERGENCY MEDICINE

## 2023-09-29 PROCEDURE — 86901 BLOOD TYPING SEROLOGIC RH(D): CPT

## 2023-09-29 PROCEDURE — 86900 BLOOD TYPING SEROLOGIC ABO: CPT | Performed by: EMERGENCY MEDICINE

## 2023-09-29 PROCEDURE — 0042T HC CT CEREBRAL PERFUSION W/WO CONTRAST: CPT

## 2023-09-29 PROCEDURE — 93306 TTE W/DOPPLER COMPLETE: CPT | Performed by: INTERNAL MEDICINE

## 2023-09-29 PROCEDURE — 85610 PROTHROMBIN TIME: CPT | Performed by: EMERGENCY MEDICINE

## 2023-09-29 PROCEDURE — 93306 TTE W/DOPPLER COMPLETE: CPT

## 2023-09-29 PROCEDURE — 93880 EXTRACRANIAL BILAT STUDY: CPT

## 2023-09-29 PROCEDURE — 86850 RBC ANTIBODY SCREEN: CPT | Performed by: EMERGENCY MEDICINE

## 2023-09-29 PROCEDURE — 70498 CT ANGIOGRAPHY NECK: CPT

## 2023-09-29 PROCEDURE — 70496 CT ANGIOGRAPHY HEAD: CPT

## 2023-09-29 PROCEDURE — 85025 COMPLETE CBC W/AUTO DIFF WBC: CPT | Performed by: EMERGENCY MEDICINE

## 2023-09-29 PROCEDURE — 86900 BLOOD TYPING SEROLOGIC ABO: CPT

## 2023-09-29 PROCEDURE — 70450 CT HEAD/BRAIN W/O DYE: CPT

## 2023-09-29 RX ORDER — TRAMADOL HYDROCHLORIDE 50 MG/1
50 TABLET ORAL EVERY 12 HOURS PRN
Status: DISCONTINUED | OUTPATIENT
Start: 2023-09-29 | End: 2023-09-30 | Stop reason: HOSPADM

## 2023-09-29 RX ORDER — ONDANSETRON 2 MG/ML
4 INJECTION INTRAMUSCULAR; INTRAVENOUS EVERY 6 HOURS PRN
Status: DISCONTINUED | OUTPATIENT
Start: 2023-09-29 | End: 2023-09-30 | Stop reason: HOSPADM

## 2023-09-29 RX ORDER — SODIUM CHLORIDE 0.9 % (FLUSH) 0.9 %
10 SYRINGE (ML) INJECTION EVERY 12 HOURS SCHEDULED
Status: DISCONTINUED | OUTPATIENT
Start: 2023-09-29 | End: 2023-09-30 | Stop reason: HOSPADM

## 2023-09-29 RX ORDER — BISACODYL 10 MG
10 SUPPOSITORY, RECTAL RECTAL DAILY PRN
Status: DISCONTINUED | OUTPATIENT
Start: 2023-09-29 | End: 2023-09-30 | Stop reason: HOSPADM

## 2023-09-29 RX ORDER — SODIUM CHLORIDE 0.9 % (FLUSH) 0.9 %
10 SYRINGE (ML) INJECTION AS NEEDED
Status: DISCONTINUED | OUTPATIENT
Start: 2023-09-29 | End: 2023-09-30 | Stop reason: HOSPADM

## 2023-09-29 RX ORDER — ACETAMINOPHEN 325 MG/1
650 TABLET ORAL EVERY 4 HOURS PRN
Status: DISCONTINUED | OUTPATIENT
Start: 2023-09-29 | End: 2023-09-30 | Stop reason: HOSPADM

## 2023-09-29 RX ORDER — ASPIRIN 81 MG/1
81 TABLET, CHEWABLE ORAL DAILY
Status: DISCONTINUED | OUTPATIENT
Start: 2023-09-29 | End: 2023-09-30

## 2023-09-29 RX ORDER — TRAMADOL HYDROCHLORIDE 50 MG/1
50 TABLET ORAL EVERY 12 HOURS PRN
Status: DISCONTINUED | OUTPATIENT
Start: 2023-09-29 | End: 2023-09-29

## 2023-09-29 RX ORDER — CARVEDILOL 12.5 MG/1
12.5 TABLET ORAL 2 TIMES DAILY WITH MEALS
Status: DISCONTINUED | OUTPATIENT
Start: 2023-09-30 | End: 2023-09-30 | Stop reason: HOSPADM

## 2023-09-29 RX ORDER — SODIUM CHLORIDE 9 MG/ML
40 INJECTION, SOLUTION INTRAVENOUS AS NEEDED
Status: DISCONTINUED | OUTPATIENT
Start: 2023-09-29 | End: 2023-09-30 | Stop reason: HOSPADM

## 2023-09-29 RX ORDER — ATORVASTATIN CALCIUM 80 MG/1
80 TABLET, FILM COATED ORAL NIGHTLY
Status: DISCONTINUED | OUTPATIENT
Start: 2023-09-29 | End: 2023-09-30 | Stop reason: HOSPADM

## 2023-09-29 RX ORDER — CLOPIDOGREL BISULFATE 75 MG/1
75 TABLET ORAL DAILY
Status: DISCONTINUED | OUTPATIENT
Start: 2023-09-30 | End: 2023-09-30

## 2023-09-29 RX ORDER — PANTOPRAZOLE SODIUM 40 MG/1
40 TABLET, DELAYED RELEASE ORAL
Status: DISCONTINUED | OUTPATIENT
Start: 2023-09-30 | End: 2023-09-30 | Stop reason: HOSPADM

## 2023-09-29 RX ORDER — ALUMINA, MAGNESIA, AND SIMETHICONE 2400; 2400; 240 MG/30ML; MG/30ML; MG/30ML
7.5 SUSPENSION ORAL EVERY 4 HOURS PRN
Status: DISCONTINUED | OUTPATIENT
Start: 2023-09-29 | End: 2023-09-30 | Stop reason: HOSPADM

## 2023-09-29 RX ORDER — HYDROCODONE BITARTRATE AND ACETAMINOPHEN 10; 325 MG/1; MG/1
1 TABLET ORAL EVERY 6 HOURS PRN
Status: DISCONTINUED | OUTPATIENT
Start: 2023-09-29 | End: 2023-09-29

## 2023-09-29 RX ORDER — ACETAMINOPHEN 650 MG/1
650 SUPPOSITORY RECTAL EVERY 4 HOURS PRN
Status: DISCONTINUED | OUTPATIENT
Start: 2023-09-29 | End: 2023-09-30 | Stop reason: HOSPADM

## 2023-09-29 RX ORDER — ASPIRIN 300 MG/1
300 SUPPOSITORY RECTAL DAILY
Status: DISCONTINUED | OUTPATIENT
Start: 2023-09-29 | End: 2023-09-29

## 2023-09-29 RX ORDER — CLOPIDOGREL BISULFATE 75 MG/1
300 TABLET ORAL ONCE
Status: COMPLETED | OUTPATIENT
Start: 2023-09-29 | End: 2023-09-29

## 2023-09-29 RX ORDER — HYDROCODONE BITARTRATE AND ACETAMINOPHEN 10; 325 MG/1; MG/1
1 TABLET ORAL EVERY 8 HOURS PRN
Status: DISCONTINUED | OUTPATIENT
Start: 2023-09-29 | End: 2023-09-30 | Stop reason: HOSPADM

## 2023-09-29 RX ADMIN — ASPIRIN 81 MG CHEWABLE TABLET 81 MG: 81 TABLET CHEWABLE at 14:53

## 2023-09-29 RX ADMIN — CLOPIDOGREL BISULFATE 300 MG: 75 TABLET ORAL at 14:53

## 2023-09-29 RX ADMIN — Medication 10 ML: at 14:55

## 2023-09-29 RX ADMIN — HYDROCODONE BITARTRATE AND ACETAMINOPHEN 1 TABLET: 10; 325 TABLET ORAL at 21:26

## 2023-09-29 RX ADMIN — SULFUR HEXAFLUORIDE 2 ML: KIT at 15:23

## 2023-09-29 RX ADMIN — IOPAMIDOL 50 ML: 612 INJECTION, SOLUTION INTRAVENOUS at 14:07

## 2023-09-29 RX ADMIN — IOPAMIDOL 100 ML: 612 INJECTION, SOLUTION INTRAVENOUS at 14:07

## 2023-09-29 RX ADMIN — Medication 10 ML: at 21:26

## 2023-09-29 RX ADMIN — ATORVASTATIN CALCIUM 80 MG: 80 TABLET, FILM COATED ORAL at 21:26

## 2023-09-29 RX ADMIN — SACUBITRIL AND VALSARTAN 1 TABLET: 24; 26 TABLET, FILM COATED ORAL at 14:54

## 2023-09-29 RX ADMIN — Medication 10 ML: at 14:53

## 2023-09-29 NOTE — CONSULTS
[facility]   Teleneurology Note    Patient Name: Matt Bueno  : 1970  MRN: [id number]  Primary Care Physician: Stas Rust MD  Referring Site: [facility]   Teleneurology Initial Data     Arrival Date Telestroke Site: 23 Arrival Time Telestroke Site: 1243   Neurologist Evaluation Date: 23 Neurologist Evaluation Time: 1400   Date Last Known Well: 23 Time Last Known Well: 030     History     Chief Complaint: Sudden onset of weakness of the right upper extremity along with tingling and numbness in the right side of the face and right arm and right leg  HPI: 52-year-old right handed morbidly obese gentleman with a body mass index of 63 kg per m² woke up at 3 in the morning with weakness in the right upper extremity along with tingling in the right side of the face and right upper and lower extremities.  However patient did not come to the emergency room until 12:43 PM.  I was called by the emergency room physician around 2 PM during which time I did the acute telemedicine procedure and noted that the NIH stroke scale is 3.  Patient was not a candidate for any acute thrombolytic therapy or any acute thrombectomy and will be admitted to the hospitalist service for further stroke work-up.    Stroke Risk Factors/ Pertinent Data     Stroke risk factors: coronary artery disease, dyslipidemia, heart failure, obesity/ physical inactivity, sleep apnea, hypertension  Anticoagulants prior to arrival: none  Antiplatelets prior to arrival: aspirin  Statins prior to arrival: none     Scoring Scales     Pre-Stroke Modified Sarah Scale: 0 - No Symptoms at all.[place] Coma Scale  Best Eye Response: Spontaneous  Best Verbal Response: Oriented  Best Motor Response: Follows commands  Umm Coma Scale Score: 15Intracerebral Hemmorhage (ICH) Score  Umm Coma Score: 13-15  Age>=80: no     NIH Stroke Scale     NIHSS Performed Date: 23 NIHSS Performed Time: 1400   Interval:  baseline  1a. Level of Consciousness: 0-->Alert, keenly responsive  1b. LOC Questions: 0-->Answers both questions correctly  1c. LOC Commands: 0-->Performs both tasks correctly  2. Best Gaze: 0-->Normal  3. Visual: 0-->No visual loss  4. Facial Palsy: 0-->Normal symmetrical movements  5a. Motor Arm, Left: 0-->No drift, limb holds 90 (or 45) degrees for full 10 secs  5b. Motor Arm, Right: 1-->Drift, limb holds 90 (or 45) degrees, but drifts down before full 10 secs, does not hit bed or other support  6a. Motor Leg, Left: 0-->No drift, leg holds 30 degree position for full 5 secs  6b. Motor Leg, Right: 1-->Drift, leg falls by the end of the 5-sec period but does not hit bed  7. Limb Ataxia: 0-->Absent  8. Sensory: 1-->Mild-to-moderate sensory loss, patient feels pinprick is less sharp or is dull on the affected side, or there is a loss of superficial pain with pinprick, but patient is aware of being touched  9. Best Language: 0-->No aphasia, normal  10. Dysarthria: 0-->Normal  11. Extinction and Inattention (formerly Neglect): 0-->No abnormality  Total (NIH Stroke Scale): 3     Review of Systems     Review of Systems   HENT: Negative.     Eyes: Negative.    Respiratory: Negative.     Cardiovascular: Negative.    Gastrointestinal: Negative.    Endocrine: Negative.    Genitourinary: Negative.    Musculoskeletal: Negative.    Skin: Negative.    Allergic/Immunologic: Negative.    Neurological:  Positive for weakness and numbness.   Hematological: Negative.    Psychiatric/Behavioral: Negative.       Objective   Exam     Exam performed with the help of support staff from the referring site  Neurological Exam  Mental Status  Awake, alert and oriented to person, place and time. Oriented to person, place, time and situation. Recent and remote memory are intact. Speech is normal. Language is fluent with no aphasia. Attention and concentration are normal. Fund of knowledge is appropriate for level of education.    Cranial  Nerves  CN I: Sense of smell is normal.  CN II: Visual acuity is normal. Visual fields full to confrontation.  CN III, IV, VI: Extraocular movements intact bilaterally. Normal lids and orbits bilaterally. Pupils equal round and reactive to light bilaterally.  CN V: Facial sensation is normal.  CN VII: Full and symmetric facial movement.  CN IX, X: Palate elevates symmetrically. Normal gag reflex.  CN XI: Shoulder shrug strength is normal.  CN XII: Tongue midline without atrophy or fasciculations.    Motor  Normal muscle bulk throughout. No fasciculations present. Normal muscle tone. No abnormal involuntary movements. Strength is 5/5 in all four extremities except as noted. Right pronator drift. Left hemiparesis.    Sensory Extinction to double simultaneous stimulation of the right face, arm and leg.  Decree sensation in the right upper and lower extremities and right side of the face..  Passed the bedside swallow.  Result Review    Results      CT angiogram of the head and neck with contrast shows the following:    FINDINGS:     Head CT: The ventricles are proper size. There is no evidence of  hemorrhage. No masses are identified.  No extra-axial fluid is seen. The  sinuses are unremarkable.     CTA:     Aortic arch:  Arch shows no significant narrowing. Great vessel origins  are widely patent. Technique is suboptimal secondary to patient's body  habitus. Patient was scanned 3 times for the CTA perfusion due to short  off of the unit. Lungs are clear except for azygos lobe, normal variant.  Streak artifact from pacemaker identified on the left.     Right carotid:  No significant stenosis is seen of the cervical common  or internal carotid artery. Right carotid artery is very tortuous.     Left carotid: There is mild calcified plaque at the left carotid bulb  causing 50 to 60% stenosis. Left internal and common carotid arteries  are very tortuous.     Vertebrals: The vertebrals are patent. No significant stenosis  is  present. System is right vertebral dominant.     The cranial circulation is unremarkable. There is no significant  stenosis, aneurysm, or occlusion.     IMPRESSION:  50 to 60% stenosis left ICA.    FINDINGS:  The lung apices demonstrate no abnormalities. The thyroid gland is  unremarkable. The visualized cervical spine demonstrates no  abnormalities. The visualized paranasal sinuses are clear.      Aortic arch: The aortic arch has a normal contour.     Right carotid:  No significant stenosis is seen of the cervical common  or internal carotid artery. Right carotid artery is tortuous.     Left carotid: There is mild calcified plaque causing 50 to 60% stenosis.  Left carotid artery is tortuous.     Vertebrals: Right vertebral artery is dominant. No significant stenosis  is present.     IMPRESSION:  50 to 60% stenosis left internal carotid artery.     The CT perfusion scan shows the following:    FINDINGS:     TOTAL HYPOPERFUSION: Using the threshold of Tmax greater than six  seconds, there is an area of hypoperfusion in the left MCA territory  with a total volume of hyperperfusion of 6 mL.     CORE INFARCT: Using the threshold of CBF less than 30%, there is no area  of ischemic core in the left MCA territory with a total volume of  ischemic core of 0  mL.     PENUMBRA: The penumbra volume (mismatch volume) is 6 mL. The mismatch  ratio is infinite.     IMPRESSION:  6 mL area of hypoperfusion in the distribution of the left  middle cerebral artery..    Personal review of CNS imaging:(Official report by radiologist pending)  Imaging  CT Imaging Review: CT Imaging reviewed, NO acute infarct/ hemorrhage seen  CTA Imaging Review: CTA Imaging reviewed, NO large vessel occlusion or severe stenosis seen  CT Perfusion Review: CT perfusion reviewed, NORMAL    Thrombolytic   Thrombolytics: thrombolytic not given     Assessment & Plan   Assessment/ Plan     Assessment:  Acute Stroke Evaluation: Suspected ACUTE ischemic stroke        Plan:  Patient will be admitted to the hospitalist service on cardiac telemetry monitoring for stroke work-up.  To allow permissive hypertension with systolic blood pressure between 180-200.  Diastolic blood pressure between .  He passed the bedside swallow and he can be on a cardiac diet.  He will be given baby aspirin and loaded with Plavix 300 mg followed by baby aspirin and Plavix 75 mg daily.  Atorvastatin 80 mg daily.  Tomorrow a.m. labs should include lipid profile, sedimentation rate, hemoglobin A1c, TSH, vitamin B12, folic acid levels.  He has underlying obstructive sleep apnea for which she has not used a CPAP device for 6 months and I have requested his wife to bring in this device and we will provide him with a suitable mask.  Carotid Doppler studies and transthoracic echocardiogram to look for ejection fraction.  To resume his home medications.  Stroke education has been started.  MRI cannot be performed as he has an ICD which is not MRI compatible.  Physical therapy and Occupational Therapy to evaluate him tomorrow to see if he will be a good candidate for inpatient rehabilitation.  He will be followed up by inpatient telemetry neurology team.    Final Impression and Plan: Acute cerebrovascular event with NIH stroke scale of 3.  Patient not a candidate for any acute thrombolytic agent as he is beyond the 4.5 hours of window interval.  He is not a candidate for any acute thrombectomy as there is no evidence of a visible thrombus in the CT angiogram.    Disposition     Disposition: The patient will remain at the referring institution for further evaluation and management    Medical Decision Making  Medical Data Reviewed: Data reviewed including: clinical labs, radiology and/or medical tests, Obtaining/ reviewing old medical records, Obtaining case history from another source, Independent review of CNS images  Length of visit: 30 minutes in this critical care evaluation and management of the  patient in the emergency room setting of acute stroke    I, Stacy Dangelo MD, saw the patient on 09/29/23 at 1400 for an initial in-patient or emergency room telememedicine face to face consult using interactive technology for 30 minutes. The location of the patient was [place]. I was located at [facility]  .    I have proceeded with this evaluation at the request of the referring practitioner as it is felt to be an emergency setting and no appropriate specialist is available to perform this evaluation. The originating hospital has reported that this is the correct patient and has obtained consent from the patient/surrogate to perform this telemedicine evaluation(including obtaining history, performing examination and reviewing data provided by the patient an/or originating site of care provider)    I have introduced myself to the patient, provided my credentials, disclosed my location, and determined that, based on review of the patient's chart and discussion with the patient's primary team, telemedicine via a HIPAA compliant, real-time, face-to-face two-way, interactive audio and video platform is an appropriate and effective means of providing the service.    The patient/surrogate has a right to refuse this evaluation as they have been explained risks including potential loss of confidentiality, benefits, alternatives, and the potential need for subsequent face-to-face care. In this evaluation, we will be providing recommendations only.  The ultimate decision to follow or not to follow these recommendations will be left to the bedside treating/requesting practitioner.    The patient/surrogate has been notified that other healthcare professionals including technical person may be involved in this A/V evaluation.  All laws concerning confidentiality and patient access to medical records and copies of medical records apply to telemedicine.  The patient/surrogate has received the originating site's  Health Notice of Privacy Practices.    Stacy Dangelo MD

## 2023-09-29 NOTE — H&P
[facility] HOSPITALIST   HISTORY AND PHYSICAL      Name:  Matt Bueno   Age:  52 y.o.  Sex:  male  :  1970  MRN:  [id number]   Visit Number:  [id number]  Admission Date:  2023  Date Of Service:  23  Primary Care Physician:  Stas Rust MD    Chief Complaint:     Right arm weakness    History Of Presenting Illness:      Patient is a 52 year old male who presents to the hospital 2023 with complaints of right arm and leg weakness and right facial numbness that were present this morning when he woke up.  Patient did note that he was in normal state of health around 4am when waking to go to the bathroom.  NIG score was 4 on arrival.  Patient with past health history significant for morbid obesity, cardiomyopathy, ICD placement, chronic systolic heart failure with EF-30%, POOJA with Cpap, history of gastric sleeve and chronic pain syndrome.  Noted that patient's defibrillator is not MRI compatible.  Patient was not a candidate for thromolytics.  Patient is disabled and lives at home with his wife.  No assistive devices.    Work up in the emergency department noted unremarkable CMP and CBC with H/H-.  CXR with no acute process noted.  Vital signs within normal limits on arrival.  CT head with no acute process, CTA head/ neck noted 50-60% stenosis left ICA.  Perfusion studies noted 6ml area of hypoperfusion in the distribution of the left middle cerebral artery.  Neurology was contacted in the emergency department.  Hospitalist was contacted for admission.    Review Of Systems:    All systems were reviewed and negative except as mentioned in history of presenting illness, assessment and plan.    Past Medical History: Patient  has a past medical history of Abnormal ECG (2018), Back pain, Bilateral arm pain, CHF (congestive heart failure), Congenital heart disease (12/10/2018), Enlarged heart, GERD (gastroesophageal reflux disease), Hypertension, Left leg pain, Neck  pain, POOJA on CPAP, Sciatic nerve pain, Sleep apnea, SOBOE (shortness of breath on exertion), and Wears glasses.    Past Surgical History: Patient  has a past surgical history that includes Gastric Sleeve (2013); Shoulder arthroscopy (Bilateral); Knee Arthroscopy (Left); Cardiac catheterization (N/A, 11/16/2018); Webb tooth extraction; Esophagogastroduodenoscopy; Cardiac electrophysiology procedure (N/A, 04/01/2019); and Cardiac defibrillator placement (04/01/2019).    Social History: Patient  reports that he has never smoked. He has never used smokeless tobacco. He reports current alcohol use. He reports that he does not use drugs.    Family History:  Patient's family history has been reviewed and found to be noncontributory.     Allergies:      Patient has no known allergies.    Home Medications:    Prior to Admission Medications       Prescriptions Last Dose Informant Patient Reported? Taking?    carvedilol (COREG) 12.5 MG tablet   No No    Take 1 tablet by mouth 2 (Two) Times a Day With Meals.    furosemide (LASIX) 40 MG tablet  Self No No    Take 1 tablet by mouth Daily.    Patient taking differently:  Take 1 tablet by mouth As Needed.    HYDROcodone-acetaminophen (NORCO)  MG per tablet   Yes No    Take 1 tablet by mouth Every 6 (Six) Hours.    omeprazole (priLOSEC) 20 MG capsule   Yes No    Take 1 capsule by mouth Daily.    predniSONE (DELTASONE) 20 MG tablet   No No    3 po daily 2 days then 2 po daily 3 days then 1 po daily 3 days then 1/2 po daily 2 days    sacubitril-valsartan (ENTRESTO)  MG tablet   No No    Take 1 tablet by mouth 2 (Two) Times a Day.    traMADol (ULTRAM) 50 MG tablet   Yes No    Take 1 tablet by mouth Every 12 (Twelve) Hours.          ED Medications:    Medications   sodium chloride 0.9 % flush 10 mL (has no administration in time range)   sodium chloride 0.9 % flush 10 mL (10 mL Intravenous Given 9/29/23 2259)   sodium chloride 0.9 % flush 10 mL (10 mL Intravenous Given  "9/29/23 1453)   sodium chloride 0.9 % infusion 40 mL (has no administration in time range)   acetaminophen (TYLENOL) tablet 650 mg (has no administration in time range)     Or   acetaminophen (TYLENOL) suppository 650 mg (has no administration in time range)   atorvastatin (LIPITOR) tablet 80 mg (has no administration in time range)   ondansetron (ZOFRAN) injection 4 mg (has no administration in time range)   bisacodyl (DULCOLAX) suppository 10 mg (has no administration in time range)   aluminum-magnesium hydroxide-simethicone (MAALOX MAX) 400-400-40 MG/5ML suspension 7.5 mL (has no administration in time range)   aspirin chewable tablet 81 mg (81 mg Oral Given 9/29/23 1453)   clopidogrel (PLAVIX) tablet 300 mg (300 mg Oral Given 9/29/23 1453)     And   clopidogrel (PLAVIX) tablet 75 mg (has no administration in time range)   sacubitril-valsartan (ENTRESTO) 24-26 MG tablet 1 tablet (1 tablet Oral Given 9/29/23 1454)   iopamidol (ISOVUE-300) 61 % injection 50 mL (50 mL Intravenous Given 9/29/23 1407)   iopamidol (ISOVUE-300) 61 % injection 100 mL (100 mL Intravenous Given 9/29/23 1407)   Sulfur Hexafluoride Microsph (LUMASON) 60.7-25 MG IV reconstituted suspension reconstituted suspension 2 mL (2 mL Intravenous Given 9/29/23 1523)     Vital Signs:  Temp:  [97.9 °F (36.6 °C)] 97.9 °F (36.6 °C)  Heart Rate:  [80-81] 80  Resp:  [20] 20  BP: (108-137)/(47-73) 108/47        09/29/23  1235 09/29/23  1522   Weight: (!) 172 kg (380 lb) (pt. in wheelchair) (!) 172 kg (379 lb 3.1 oz)     Body mass index is 63.1 kg/m².    Physical Exam:     Most recent vital Signs: /47   Pulse 80   Temp 97.9 °F (36.6 °C) (Oral)   Resp 20   Ht 165.1 cm (65\")   Wt (!) 172 kg (379 lb 3.1 oz)   SpO2 98%   BMI 63.10 kg/m²     Physical Exam  Vitals and nursing note reviewed.   Constitutional:       General: He is not in acute distress.     Appearance: He is obese. He is not toxic-appearing.   HENT:      Head: Normocephalic and " atraumatic.      Mouth/Throat:      Mouth: Mucous membranes are moist.   Eyes:      Pupils: Pupils are equal, round, and reactive to light.   Cardiovascular:      Rate and Rhythm: Normal rate and regular rhythm.      Pulses: Normal pulses.      Heart sounds: Normal heart sounds.   Pulmonary:      Effort: Pulmonary effort is normal.      Breath sounds: Normal breath sounds.   Abdominal:      General: Bowel sounds are normal.      Palpations: Abdomen is soft.   Skin:     General: Skin is warm and dry.      Capillary Refill: Capillary refill takes less than 2 seconds.   Neurological:      General: No focal deficit present.      Mental Status: He is alert and oriented to person, place, and time. Mental status is at baseline.      Cranial Nerves: No dysarthria or facial asymmetry.      Sensory: Sensory deficit (right  sided decreased sensation) present.      Motor: Weakness (right  weaker than left) present.      Comments: Able to hold right arm and leg up without drift on exam   Psychiatric:         Mood and Affect: Mood normal.         Behavior: Behavior normal.         Thought Content: Thought content normal.       Laboratory data:    I have reviewed the labs done in the emergency room.    Results from last 7 days   Lab Units 09/29/23  1319   SODIUM mmol/L 140   POTASSIUM mmol/L 4.6   CHLORIDE mmol/L 104   CO2 mmol/L 25.0   BUN mg/dL 13   CREATININE mg/dL 0.60*   CALCIUM mg/dL 8.7   BILIRUBIN mg/dL 0.4   ALK PHOS U/L 53   ALT (SGPT) U/L 10   AST (SGOT) U/L 10   GLUCOSE mg/dL 130*     Results from last 7 days   Lab Units 09/29/23  1319   WBC 10*3/mm3 9.41   HEMOGLOBIN g/dL 11.4*   HEMATOCRIT % 35.5*   PLATELETS 10*3/mm3 246     Results from last 7 days   Lab Units 09/29/23  1319   INR  0.94                               Invalid input(s): USDES,  BLOODU, NITRITITE, BACT, EP    Pain Management Panel           No data to display                EKG:      Sinus rhythm with rate of 76; no acute ST/T wave abnormalities;  occasional PVCs    Radiology:    CT Angiogram Neck    Result Date: 9/29/2023  PROCEDURE: CT ANGIOGRAM NECK-  HISTORY:  TECHNIQUE: Thin-section axial images of the neck were performed after the administration of 100 mL of Isovue 370 intravenously. 3D MIP images were performed and reviewed. NASCET technique is utilized for stenosis evaluation. This study was performed with techniques to keep radiation doses as low as reasonably achievable, (ALARA). Individualized dose reduction techniques using automated exposure control or adjustment of mA and/or kV according to the patient size were employed.  FINDINGS: The lung apices demonstrate no abnormalities. The thyroid gland is unremarkable. The visualized cervical spine demonstrates no abnormalities. The visualized paranasal sinuses are clear.  Aortic arch: The aortic arch has a normal contour.  Right carotid:  No significant stenosis is seen of the cervical common or internal carotid artery. Right carotid artery is tortuous.  Left carotid: There is mild calcified plaque causing 50 to 60% stenosis. Left carotid artery is tortuous.  Vertebrals: Right vertebral artery is dominant. No significant stenosis is present.      50 to 60% stenosis left internal carotid artery.  CTDI: DLP:3592.08 mGy.cm  This report was signed and finalized on 9/29/2023 2:25 PM by Stacy Fernandez MD.      XR Chest 1 View    Result Date: 9/29/2023  PROCEDURE: XR CHEST 1 VW-    HISTORY: Stroke Protocol (Onset > 12 hrs)  COMPARISON: April 2019.  FINDINGS: Apical lordotic view. The heart is borderline in size. The mediastinum is unremarkable. The lungs are clear. There is no acute infiltrate. There is no pneumothorax. There are no acute osseous abnormalities. A pacemaker overlies the left chest.       No acute cardiopulmonary process.        Images were reviewed, interpreted, and dictated by Dr. Stacy Fernandez MD Transcribed by Courtney Mccord PA-C.  This report was signed and finalized on 9/29/2023 2:43 PM  by Stacy Fernandez MD.      CT Head Without Contrast Stroke Protocol    Result Date: 9/29/2023  PROCEDURE: CT HEAD WO CONTRAST STROKE PROTOCOL-  HISTORY: Stroke, follow up  COMPARISON: None.  TECHNIQUE: Multiple axial CT images were performed from the foramen magnum to the vertex. Individualized dose reduction techniques using automated exposure control or adjustment of mA and/or kV according to the patient size were employed.  FINDINGS: The patient was scanned twice secondary to significant motion artifact on the first scan. The brain parenchyma is unremarkable.  The ventricles are proper size. There is no evidence of hemorrhage. No masses are identified. No abnormal extra-axial fluid is seen. The paranasal sinuses and mastoid air cells are unremarkable.      No acute intracranial process.        This report was signed and finalized on 9/29/2023 1:08 PM by Stacy Fernandez MD.      CT Angiogram Head w AI Analysis of LVO    Result Date: 9/29/2023  PROCEDURE: CT ANGIOGRAM HEAD W AI ANALYSIS OF LVO-  HISTORY: rue weakness, right sided numbness  TECHNIQUE: Thin section axial CT with IV contrast supplemented with multiplanar 3 D reconstructions of the head and neck. This study was performed with techniques to keep radiation doses as low as reasonably achievable, (ALARA). Individualized dose reduction techniques using automated exposure control or adjustment of mA and/or kV according to the patient size were employed.  FINDINGS:  Head CT: The ventricles are proper size. There is no evidence of hemorrhage. No masses are identified.  No extra-axial fluid is seen. The sinuses are unremarkable.  CTA:  Aortic arch:  Arch shows no significant narrowing. Great vessel origins are widely patent. Technique is suboptimal secondary to patient's body habitus. Patient was scanned 3 times for the CTA perfusion due to short off of the unit. Lungs are clear except for azygos lobe, normal variant. Streak artifact from pacemaker identified on the  left.  Right carotid:  No significant stenosis is seen of the cervical common or internal carotid artery. Right carotid artery is very tortuous.  Left carotid: There is mild calcified plaque at the left carotid bulb causing 50 to 60% stenosis. Left internal and common carotid arteries are very tortuous.  Vertebrals: The vertebrals are patent. No significant stenosis is present. System is right vertebral dominant.  The cranial circulation is unremarkable. There is no significant stenosis, aneurysm, or occlusion.      50 to 60% stenosis left ICA.    CTDI: DLP:3592.08 mGy.cm   This report was signed and finalized on 9/29/2023 2:20 PM by Stacy Fernandez MD.      CT CEREBRAL PERFUSION WITH & WITHOUT CONTRAST    Result Date: 9/29/2023  PROCEDURE: CT CEREBRAL PERFUSION W WO CONTRAST-  HISTORY: Neuro deficit, acute, stroke suspected  COMPARISON:  None .  TECHNIQUE: CT perfusion was performed following the administration of Isovue-300 contrast.  FINDINGS:  TOTAL HYPOPERFUSION: Using the threshold of Tmax greater than six seconds, there is an area of hypoperfusion in the left MCA territory with a total volume of hyperperfusion of 6 mL.  CORE INFARCT: Using the threshold of CBF less than 30%, there is no area of ischemic core in the left MCA territory with a total volume of ischemic core of 0  mL.  PENUMBRA: The penumbra volume (mismatch volume) is 6 mL. The mismatch ratio is infinite.      6 mL area of hypoperfusion in the distribution of the left middle cerebral artery..     CTDI: DLP:3592.08 mGy.cm   This study was performed with techniques to keep radiation doses as low as reasonably achievable (ALARA). Individualized dose reduction techniques using automated exposure control or adjustment of mA and/or kV according to the patient size were employed.  This report was signed and finalized on 9/29/2023 2:14 PM by Stacy Fernandez MD.       Assessment:    Acute CVA  History of Cardiomyopathy  ICD placement  Chronic congestive heart  failure with EF-30%  Lower extremity edema  POOJA with Cpap  Morbid obesity  History of gastric sleeve  Chronic pain syndrome    Plan:    Acute CVA  -Admit to the hospital for further care  -At time of exam patient with some slight weakness in right arm, no drift in right leg, and less sensation in right face and arm with symptoms improving  -Stroke protocol initiated  -Neuro checks  -PT/OT evaluations  -Bedside swallow  -2D echo  -Permissive hypertension with SBP-180-200  -ASA and loading Plavix then ASA 81 and Plavix 75 daily  -Statin therapy  -Cpap needs to be examined by company as patient has not been wearing for about 6 months--case management consulted  -Carotid dopplers  -Unable to complete MRI due to ICD    Chronic  -Home medications as reconciled  -Restart chronic Lortab and Tramadol  -Continue Coreg and Entresto    Risk Assessment: High  DVT Prophylaxis: SCDs  Code Status:  Full Code   Diet: Cardiac    Patient is a full code on admission    Advance Care Planning   ACP discussion was held with the patient during this visit. Patient does not have an advance directive, declines further assistance.       Eunice Squires, APRN  09/29/23  15:29 EDT    Dictated utilizing Dragon dictation.

## 2023-09-29 NOTE — TELEPHONE ENCOUNTER
----- Message from ALFREDA Sandoval sent at 9/29/2023 11:07 AM EDT -----  Regarding: FW: Appointment Request  Contact: [phone]  Rhonda can you please call and see exactly what his symptoms are and when they started. If have been going on for a while I would recommend he see his PCP as scheduled next week and then go from there.   ----- Message -----  From: Corina Salgado RegSched Rep  Sent: 9/29/2023  11:04 AM EDT  To: ALFREDA Sandoval  Subject: FW: Appointment Request                          Pt requesting appointment for numbness on right side of body, please advise  ----- Message -----  From: Eileen Montano RegSched Rep  Sent: 9/29/2023  11:01 AM EDT  To: Neil Diaz  Subject: FW: Appointment Request                            ----- Message -----  From: Matt Bueno  Sent: 9/29/2023  10:01 AM EDT  To: [facility]  Subject: Appointment Request                              Appointment Request From: Matt Bueno    With Provider: ALFREDA Sandoval [[facility] CARDIOLOGY]    Preferred Date Range: 10/2/2023 – 10/4/2023    Preferred Times: Monday Morning, Tuesday Morning, Wednesday Morning    Reason for visit: Follow-up    Comments:  follow visit not feeling well having some numbness on right side of body

## 2023-09-29 NOTE — ED PROVIDER NOTES
Subjective  History of Present Illness:    Chief Complaint: Right-sided numbness and right upper extremity weakness    History of Present Illness: 52-year-old male history of bariatric surgery, obesity, defibrillator, CHF, here with numbness to his face, right upper extremity weakness, right lower extremity numbness, last known normal 4 AM when he got up from sleep to go to the bathroom, woke again at 7 AM when he had symptoms.    Nurses Notes reviewed and agree, including vitals, allergies, social history and prior medical history.     REVIEW OF SYSTEMS: All systems reviewed and not pertinent unless noted.  Review of Systems      Positive for: Right upper extremity weakness and numbness, facial numbness, right lower extremity numbness    Negative for: Chest pain palpitations vision loss speech changes    Past Medical History:   Diagnosis Date    Abnormal ECG 11/04/2018    ?    Acute CVA (cerebrovascular accident) 9/29/2023    Back pain     WHOLE BACK - LEFT LEG PAIN     Bilateral arm pain     FROM NECK ISSUES     CHF (congestive heart failure)     Congenital heart disease 12/10/2018    ?    Enlarged heart     GERD (gastroesophageal reflux disease)     Hypertension     Left leg pain     FROM BACK ISSUES    Neck pain     BILAT ARM PAIN     POOJA on CPAP     compliant with machine     Sciatic nerve pain     Sleep apnea     SOBOE (shortness of breath on exertion)     Wears glasses     readers       Allergies:    Patient has no known allergies.      Past Surgical History:   Procedure Laterality Date    CARDIAC CATHETERIZATION N/A 11/16/2018    Procedure: Left Heart Cath;  Surgeon: Matt Hernandez MD;  Location:  [facility];  Service: Cardiovascular    CARDIAC DEFIBRILLATOR PLACEMENT  04/01/2019    CARDIAC ELECTROPHYSIOLOGY PROCEDURE N/A 04/01/2019    Procedure: Device Implant- VDI ICD Biotronik;  Surgeon: Romeo Day MD;  Location:  [facility];  Service: Cardiology    ENDOSCOPY    "   GASTRIC SLEEVE LAPAROSCOPIC  2013    KNEE ARTHROSCOPY Left     SHOULDER ARTHROSCOPY Bilateral     both shoulder in the past    WISDOM TOOTH EXTRACTION      aLL 4 REMOVED          Social History     Socioeconomic History    Marital status:    Tobacco Use    Smoking status: Never    Smokeless tobacco: Never   Vaping Use    Vaping Use: Never used   Substance and Sexual Activity    Alcohol use: Yes     Comment: rarely    Drug use: No    Sexual activity: Yes     Partners: Female     Birth control/protection: None     Comment:          Family History   Problem Relation Age of Onset    Colon cancer Mother     Coronary artery disease Father     Heart disease Father     Heart attack Father     Heart failure Father         passed away    Heart attack Sister     Heart disease Sister     Heart failure Sister         passed away       Objective  Physical Exam:  /63 (BP Location: Left arm, Patient Position: Lying)   Pulse 76   Temp 97.9 °F (36.6 °C) (Oral)   Resp 18   Ht 165 cm (64.96\")   Wt (!) 175 kg (385 lb 5.8 oz)   SpO2 99%   BMI 64.21 kg/m²      Physical Exam    CONSTITUTIONAL: Well developed, nontoxic obese 52-year-old male,  in no acute distress.  VITAL SIGNS: per nursing, reviewed and noted  SKIN: exposed skin with no rashes, ulcerations or petechiae  EYES: Grossly EOMI, no icterus, no ptosis, no ocular palsy.  No visual field defects.  ENT: Normal voice.  Moist mucous membranes   RESPIRATORY:  No increased work of breathing. No retractions.   CARDIOVASCULAR:   Extremities pink and warm.  Good cap refill to extremities.   GI: Abdomen without distention   MUSCULOSKELETAL: Age-appropriate bulk and tone, moves all 4 extremities  NEUROLOGIC: Alert, oriented x 3.  Weakness and dysmetria with subjective decrease sensation to the right upper extremity.  Decreased subjective sensation to the right side of the face and right lower extremity.    PSYCH: appropriate affect.  : no bladder tenderness or " distention, no CVA tenderness    Critical Care  Performed by: Duane Ulrich DO  Authorized by: Duane Ulrich DO     Critical care provider statement:     Critical care time (minutes):  33    Critical care time was exclusive of:  Separately billable procedures and treating other patients    Critical care was necessary to treat or prevent imminent or life-threatening deterioration of the following conditions: Stroke.    Critical care was time spent personally by me on the following activities:  Development of treatment plan with patient or surrogate, discussions with consultants, examination of patient, obtaining history from patient or surrogate, ordering and performing treatments and interventions, ordering and review of laboratory studies, ordering and review of radiographic studies, pulse oximetry, re-evaluation of patient's condition and review of old charts    I assumed direction of critical care for this patient from another provider in my specialty: no      Care discussed with: admitting provider      ED Course:    Lab Results (last 24 hours)       Procedure Component Value Units Date/Time    CBC & Differential [[id number]]  (Abnormal) Collected: 09/29/23 1319    Specimen: Blood Updated: 09/29/23 1331    Narrative:      The following orders were created for panel order CBC & Differential.  Procedure                               Abnormality         Status                     ---------                               -----------         ------                     CBC Auto Differential[[id number]]        Abnormal            Final result                 Please view results for these tests on the individual orders.    Comprehensive Metabolic Panel [[id number]]  (Abnormal) Collected: 09/29/23 1319    Specimen: Blood Updated: 09/29/23 1348     Glucose 130 mg/dL      BUN 13 mg/dL      Creatinine 0.60 mg/dL      Sodium 140 mmol/L      Potassium 4.6 mmol/L      Chloride 104 mmol/L      CO2 25.0 mmol/L      Calcium 8.7  mg/dL      Total Protein 6.8 g/dL      Albumin 4.2 g/dL      ALT (SGPT) 10 U/L      AST (SGOT) 10 U/L      Alkaline Phosphatase 53 U/L      Total Bilirubin 0.4 mg/dL      Globulin 2.6 gm/dL      A/G Ratio 1.6 g/dL      BUN/Creatinine Ratio 21.7     Anion Gap 11.0 mmol/L      eGFR 114.7 mL/min/1.73     Narrative:      GFR Normal >60  Chronic Kidney Disease <60  Kidney Failure <15      Protime-INR [[id number]]  (Normal) Collected: 09/29/23 1319    Specimen: Blood Updated: 09/29/23 1351     Protime 13.1 Seconds      INR 0.94    Narrative:      Suggested INR therapeutic range for stable oral anticoagulant therapy:    Low Intensity therapy:   1.5-2.0  Moderate Intensity therapy:   2.0-3.0  High Intensity therapy:   2.5-4.0    CBC Auto Differential [[id number]]  (Abnormal) Collected: 09/29/23 1319    Specimen: Blood Updated: 09/29/23 1331     WBC 9.41 10*3/mm3      RBC 4.21 10*6/mm3      Hemoglobin 11.4 g/dL      Hematocrit 35.5 %      MCV 84.3 fL      MCH 27.1 pg      MCHC 32.1 g/dL      RDW 14.0 %      RDW-SD 43.3 fl      MPV 10.3 fL      Platelets 246 10*3/mm3      Neutrophil % 71.8 %      Lymphocyte % 17.1 %      Monocyte % 6.8 %      Eosinophil % 3.6 %      Basophil % 0.4 %      Immature Grans % 0.3 %      Neutrophils, Absolute 6.75 10*3/mm3      Lymphocytes, Absolute 1.61 10*3/mm3      Monocytes, Absolute 0.64 10*3/mm3      Eosinophils, Absolute 0.34 10*3/mm3      Basophils, Absolute 0.04 10*3/mm3      Immature Grans, Absolute 0.03 10*3/mm3      nRBC 0.0 /100 WBC     CBC (No Diff) [[id number]]  (Abnormal) Collected: 09/30/23 0633    Specimen: Blood Updated: 09/30/23 0706     WBC 7.59 10*3/mm3      RBC 4.10 10*6/mm3      Hemoglobin 10.8 g/dL      Hematocrit 35.0 %      MCV 85.4 fL      MCH 26.3 pg      MCHC 30.9 g/dL      RDW 14.0 %      RDW-SD 43.2 fl      MPV 10.5 fL      Platelets 233 10*3/mm3     Hemoglobin A1c [[id number]] Collected: 09/30/23 0633    Specimen: Blood Updated: 09/30/23 0703    Lipid Panel [[id number]]  Collected: 09/30/23 0633    Specimen: Blood Updated: 09/30/23 0703    aPTT [[id number]] Collected: 09/30/23 0633    Specimen: Blood Updated: 09/30/23 0703    Protime-INR [[id number]] Collected: 09/30/23 0633    Specimen: Blood Updated: 09/30/23 0703    Sedimentation Rate [[id number]] Collected: 09/30/23 0633    Specimen: Blood Updated: 09/30/23 0704    TSH [[id number]] Collected: 09/30/23 0633    Specimen: Blood Updated: 09/30/23 0703    Vitamin B12 [[id number]] Collected: 09/30/23 0633    Specimen: Blood Updated: 09/30/23 0704    Folate [[id number]] Collected: 09/30/23 0633    Specimen: Blood Updated: 09/30/23 0704             Adult Transthoracic Echo Complete W/ Cont if Necessary Per Protocol (With Agitated Saline)    Result Date: 9/29/2023    Left ventricular ejection fraction appears to be 36 - 40%.   The left ventricular cavity is moderately dilated.   Left ventricular diastolic function is consistent with (grade II w/high LAP) pseudonormalization.   The left atrial cavity is moderate to severely dilated.   Left atrial volume is mildly increased.   Saline test results are negative.   Estimated right ventricular systolic pressure from tricuspid regurgitation is normal (<35 mmHg).     CT Angiogram Neck    Result Date: 9/29/2023  PROCEDURE: CT ANGIOGRAM NECK-  HISTORY:  TECHNIQUE: Thin-section axial images of the neck were performed after the administration of 100 mL of Isovue 370 intravenously. 3D MIP images were performed and reviewed. NASCET technique is utilized for stenosis evaluation. This study was performed with techniques to keep radiation doses as low as reasonably achievable, (ALARA). Individualized dose reduction techniques using automated exposure control or adjustment of mA and/or kV according to the patient size were employed.  FINDINGS: The lung apices demonstrate no abnormalities. The thyroid gland is unremarkable. The visualized cervical spine demonstrates no abnormalities. The visualized paranasal  sinuses are clear.  Aortic arch: The aortic arch has a normal contour.  Right carotid:  No significant stenosis is seen of the cervical common or internal carotid artery. Right carotid artery is tortuous.  Left carotid: There is mild calcified plaque causing 50 to 60% stenosis. Left carotid artery is tortuous.  Vertebrals: Right vertebral artery is dominant. No significant stenosis is present.      Impression: 50 to 60% stenosis left internal carotid artery.  CTDI: DLP:3592.08 mGy.cm  This report was signed and finalized on 9/29/2023 2:25 PM by Stacy Fernandez MD.      XR Chest 1 View    Result Date: 9/29/2023  PROCEDURE: XR CHEST 1 VW-    HISTORY: Stroke Protocol (Onset > 12 hrs)  COMPARISON: April 2019.  FINDINGS: Apical lordotic view. The heart is borderline in size. The mediastinum is unremarkable. The lungs are clear. There is no acute infiltrate. There is no pneumothorax. There are no acute osseous abnormalities. A pacemaker overlies the left chest.       Impression: No acute cardiopulmonary process.        Images were reviewed, interpreted, and dictated by Dr. Stacy Fernandez MD Transcribed by Courtney Mccord PA-C.  This report was signed and finalized on 9/29/2023 2:43 PM by Stacy Fernandez MD.      US Carotid Bilateral    Result Date: 9/29/2023  PROCEDURE: US CAROTID BILATERAL-  HISTORY: left ICA blockage; I63.9-Cerebral infarction, unspecified  Technique: Real-time imaging was performed of the extracranial carotid arteries in transverse and longitudinal planes, with color duplex evaluation of blood flow velocity. Spectral analysis was performed. The cervicovertebral arteries were also examined. Stenosis evaluation is based on validated velocity criteria.   Right carotid system: CCA: -116 cm/s ICA:  96.5 cm/s ECA: 99.2 cm/s Vertebral artery: Antegrade ICA/CCA ratio: 0.83 No plaque is identified at the bifurcation.  Left carotid system: CCA: 93.8 cm/s ICA: 77.5 cm/s ECA: 79.5 cm/s Vertebral artery: Antegrade ICA/CCA  ratio: 0.83 Mild calcified plaque is identified at the bifurcation.        Impression: Mild plaque within the left carotid bulb causing 50% stenosis or less.  No plaque identified in the right carotid bulb, no stenosis seen.   This report was signed and finalized on 9/29/2023 4:18 PM by Stacy Fernandez MD.      CT Head Without Contrast Stroke Protocol    Result Date: 9/29/2023  PROCEDURE: CT HEAD WO CONTRAST STROKE PROTOCOL-  HISTORY: Stroke, follow up  COMPARISON: None.  TECHNIQUE: Multiple axial CT images were performed from the foramen magnum to the vertex. Individualized dose reduction techniques using automated exposure control or adjustment of mA and/or kV according to the patient size were employed.  FINDINGS: The patient was scanned twice secondary to significant motion artifact on the first scan. The brain parenchyma is unremarkable.  The ventricles are proper size. There is no evidence of hemorrhage. No masses are identified. No abnormal extra-axial fluid is seen. The paranasal sinuses and mastoid air cells are unremarkable.      Impression: No acute intracranial process.        This report was signed and finalized on 9/29/2023 1:08 PM by Stacy Fernandez MD.      CT Angiogram Head w AI Analysis of LVO    Result Date: 9/29/2023  PROCEDURE: CT ANGIOGRAM HEAD W AI ANALYSIS OF LVO-  HISTORY: rue weakness, right sided numbness  TECHNIQUE: Thin section axial CT with IV contrast supplemented with multiplanar 3 D reconstructions of the head and neck. This study was performed with techniques to keep radiation doses as low as reasonably achievable, (ALARA). Individualized dose reduction techniques using automated exposure control or adjustment of mA and/or kV according to the patient size were employed.  FINDINGS:  Head CT: The ventricles are proper size. There is no evidence of hemorrhage. No masses are identified.  No extra-axial fluid is seen. The sinuses are unremarkable.  CTA:  Aortic arch:  Arch shows no significant  narrowing. Great vessel origins are widely patent. Technique is suboptimal secondary to patient's body habitus. Patient was scanned 3 times for the CTA perfusion due to short off of the unit. Lungs are clear except for azygos lobe, normal variant. Streak artifact from pacemaker identified on the left.  Right carotid:  No significant stenosis is seen of the cervical common or internal carotid artery. Right carotid artery is very tortuous.  Left carotid: There is mild calcified plaque at the left carotid bulb causing 50 to 60% stenosis. Left internal and common carotid arteries are very tortuous.  Vertebrals: The vertebrals are patent. No significant stenosis is present. System is right vertebral dominant.  The cranial circulation is unremarkable. There is no significant stenosis, aneurysm, or occlusion.      Impression: 50 to 60% stenosis left ICA.    CTDI: DLP:3592.08 mGy.cm   This report was signed and finalized on 9/29/2023 2:20 PM by Stacy Fernandez MD.      CT CEREBRAL PERFUSION WITH & WITHOUT CONTRAST    Result Date: 9/29/2023  PROCEDURE: CT CEREBRAL PERFUSION W WO CONTRAST-  HISTORY: Neuro deficit, acute, stroke suspected  COMPARISON:  None .  TECHNIQUE: CT perfusion was performed following the administration of Isovue-300 contrast.  FINDINGS:  TOTAL HYPOPERFUSION: Using the threshold of Tmax greater than six seconds, there is an area of hypoperfusion in the left MCA territory with a total volume of hyperperfusion of 6 mL.  CORE INFARCT: Using the threshold of CBF less than 30%, there is no area of ischemic core in the left MCA territory with a total volume of ischemic core of 0  mL.  PENUMBRA: The penumbra volume (mismatch volume) is 6 mL. The mismatch ratio is infinite.      Impression: 6 mL area of hypoperfusion in the distribution of the left middle cerebral artery..     CTDI: DLP:3592.08 mGy.cm   This study was performed with techniques to keep radiation doses as low as reasonably achievable (ALARA).  Individualized dose reduction techniques using automated exposure control or adjustment of mA and/or kV according to the patient size were employed.  This report was signed and finalized on 9/29/2023 2:14 PM by Stacy Fernandez MD.        MDM     Amount and/or Complexity of Data Reviewed  Tests in the radiology section of CPT®: reviewed    NIH Stroke Scale/Score (NIHSS) - MDCalc  Calculated on Sep 29 2023 1:36 PM  4 points -> NIH Stroke Scale  For 5B, 2 points, 7 and 8    ED Course as of 09/30/23 0710   Fri Sep 29, 2023   1330 EKG interpreted by me reveals sinus rhythm rate of 76 occasional PVCs.  No ischemic changes [PF]      ED Course User Index  [PF] Duane Ulrich, DO       Medical Decision Making:    Initial impression of presenting illness: 52-year-old male history of bariatric surgery, obesity, defibrillator , CHF, here with numbness to his face, right upper extremity weakness, right lower extremity numbness, last known normal 4 AM when he got up from sleep to go to the bathroom, woke again at 7 AM when he had symptoms.    DDX: includes but is not limited to: Stroke         Patient arrives normotensive afebrile no tachycardia sats 98% with vitals interpreted by myself.     Pertinent features from physical exam: NIH stroke scale for.    Initial diagnostic plan: Stroke rule out    Results from initial plan were reviewed and interpreted by me revealing CT head completed before I was able to arrive to the room revealed no acute intracranial process per radiologist.      Diagnostic information from other sources: Family member at the bedside old record review    Interventions / Re-evaluation: Consulted with stroke neurology service evaluated patient at bedside recommended admission and stroke work-up.  Patient does have a defibrillator which is not MRI compatible.  Not tPA candidate.    Results/clinical rationale were discussed with patient and family member    Consultations/Discussion of results with other  physicians: Stroke neurology Dr. Dangelo, Dr. Lowe hospitalist    Disposition plan: Plan for admission  -----      Final diagnoses:   Ischemic stroke            Duane Ulrich,   09/30/23 0711

## 2023-09-30 ENCOUNTER — READMISSION MANAGEMENT (OUTPATIENT)
Dept: CALL CENTER | Facility: HOSPITAL | Age: 53
End: 2023-09-30
Payer: MEDICARE

## 2023-09-30 VITALS
WEIGHT: 315 LBS | TEMPERATURE: 97.4 F | RESPIRATION RATE: 18 BRPM | DIASTOLIC BLOOD PRESSURE: 81 MMHG | HEIGHT: 65 IN | BODY MASS INDEX: 52.48 KG/M2 | HEART RATE: 87 BPM | OXYGEN SATURATION: 100 % | SYSTOLIC BLOOD PRESSURE: 150 MMHG

## 2023-09-30 PROBLEM — I50.22 CHRONIC HFREF (HEART FAILURE WITH REDUCED EJECTION FRACTION): Status: ACTIVE | Noted: 2023-09-30

## 2023-09-30 LAB
APTT PPP: 27 SECONDS (ref 23.5–35.5)
CHOLEST SERPL-MCNC: 166 MG/DL (ref 0–200)
DEPRECATED RDW RBC AUTO: 43.2 FL (ref 37–54)
ERYTHROCYTE [DISTWIDTH] IN BLOOD BY AUTOMATED COUNT: 14 % (ref 12.3–15.4)
ERYTHROCYTE [SEDIMENTATION RATE] IN BLOOD: 30 MM/HR (ref 0–20)
FOLATE SERPL-MCNC: 5.64 NG/ML (ref 4.78–24.2)
HBA1C MFR BLD: 5.5 % (ref 4.8–5.6)
HCT VFR BLD AUTO: 35 % (ref 37.5–51)
HDLC SERPL-MCNC: 52 MG/DL (ref 40–60)
HGB BLD-MCNC: 10.8 G/DL (ref 13–17.7)
INR PPP: 0.97 (ref 0.9–1.1)
LDLC SERPL CALC-MCNC: 99 MG/DL (ref 0–100)
LDLC/HDLC SERPL: 1.88 {RATIO}
MCH RBC QN AUTO: 26.3 PG (ref 26.6–33)
MCHC RBC AUTO-ENTMCNC: 30.9 G/DL (ref 31.5–35.7)
MCV RBC AUTO: 85.4 FL (ref 79–97)
PLATELET # BLD AUTO: 233 10*3/MM3 (ref 140–450)
PMV BLD AUTO: 10.5 FL (ref 6–12)
PROTHROMBIN TIME: 13.4 SECONDS (ref 12.3–15.1)
RBC # BLD AUTO: 4.1 10*6/MM3 (ref 4.14–5.8)
TRIGL SERPL-MCNC: 82 MG/DL (ref 0–150)
TSH SERPL DL<=0.05 MIU/L-ACNC: 1.69 UIU/ML (ref 0.27–4.2)
VIT B12 BLD-MCNC: 248 PG/ML (ref 211–946)
VLDLC SERPL-MCNC: 15 MG/DL (ref 5–40)
WBC NRBC COR # BLD: 7.59 10*3/MM3 (ref 3.4–10.8)

## 2023-09-30 PROCEDURE — 99214 OFFICE O/P EST MOD 30 MIN: CPT | Performed by: PSYCHIATRY & NEUROLOGY

## 2023-09-30 PROCEDURE — 97161 PT EVAL LOW COMPLEX 20 MIN: CPT

## 2023-09-30 PROCEDURE — 85730 THROMBOPLASTIN TIME PARTIAL: CPT | Performed by: PSYCHIATRY & NEUROLOGY

## 2023-09-30 PROCEDURE — 82607 VITAMIN B-12: CPT | Performed by: PSYCHIATRY & NEUROLOGY

## 2023-09-30 PROCEDURE — 83036 HEMOGLOBIN GLYCOSYLATED A1C: CPT | Performed by: PSYCHIATRY & NEUROLOGY

## 2023-09-30 PROCEDURE — 85027 COMPLETE CBC AUTOMATED: CPT | Performed by: PSYCHIATRY & NEUROLOGY

## 2023-09-30 PROCEDURE — 84443 ASSAY THYROID STIM HORMONE: CPT | Performed by: PSYCHIATRY & NEUROLOGY

## 2023-09-30 PROCEDURE — 80061 LIPID PANEL: CPT | Performed by: PSYCHIATRY & NEUROLOGY

## 2023-09-30 PROCEDURE — 25010000002 ENOXAPARIN PER 10 MG: Performed by: PSYCHIATRY & NEUROLOGY

## 2023-09-30 PROCEDURE — 85652 RBC SED RATE AUTOMATED: CPT | Performed by: PSYCHIATRY & NEUROLOGY

## 2023-09-30 PROCEDURE — 82746 ASSAY OF FOLIC ACID SERUM: CPT | Performed by: PSYCHIATRY & NEUROLOGY

## 2023-09-30 PROCEDURE — G0378 HOSPITAL OBSERVATION PER HR: HCPCS

## 2023-09-30 PROCEDURE — 85610 PROTHROMBIN TIME: CPT | Performed by: PSYCHIATRY & NEUROLOGY

## 2023-09-30 PROCEDURE — 96372 THER/PROPH/DIAG INJ SC/IM: CPT

## 2023-09-30 RX ORDER — ATORVASTATIN CALCIUM 80 MG/1
80 TABLET, FILM COATED ORAL NIGHTLY
Qty: 90 TABLET | Refills: 0 | Status: SHIPPED | OUTPATIENT
Start: 2023-09-30

## 2023-09-30 RX ORDER — ENOXAPARIN SODIUM 100 MG/ML
1 INJECTION SUBCUTANEOUS EVERY 12 HOURS
Status: DISCONTINUED | OUTPATIENT
Start: 2023-09-30 | End: 2023-09-30

## 2023-09-30 RX ORDER — WARFARIN SODIUM 5 MG/1
TABLET ORAL
Qty: 30 TABLET | Refills: 0 | Status: SHIPPED | OUTPATIENT
Start: 2023-09-30 | End: 2023-10-03 | Stop reason: SDUPTHER

## 2023-09-30 RX ORDER — ENOXAPARIN SODIUM 150 MG/ML
0.7 INJECTION SUBCUTANEOUS EVERY 12 HOURS
Qty: 8 ML | Refills: 0 | Status: SHIPPED | OUTPATIENT
Start: 2023-09-30

## 2023-09-30 RX ORDER — ENOXAPARIN SODIUM 150 MG/ML
0.7 INJECTION SUBCUTANEOUS EVERY 12 HOURS
Status: DISCONTINUED | OUTPATIENT
Start: 2023-09-30 | End: 2023-09-30 | Stop reason: HOSPADM

## 2023-09-30 RX ADMIN — Medication 10 ML: at 09:09

## 2023-09-30 RX ADMIN — ASPIRIN 81 MG CHEWABLE TABLET 81 MG: 81 TABLET CHEWABLE at 09:08

## 2023-09-30 RX ADMIN — CLOPIDOGREL BISULFATE 75 MG: 75 TABLET ORAL at 09:08

## 2023-09-30 RX ADMIN — PANTOPRAZOLE SODIUM 40 MG: 40 TABLET, DELAYED RELEASE ORAL at 06:14

## 2023-09-30 RX ADMIN — ENOXAPARIN SODIUM 120 MG: 150 INJECTION SUBCUTANEOUS at 13:00

## 2023-09-30 NOTE — DISCHARGE INSTRUCTIONS
Patient to be discharged home today with home health.  Patient to continue Lovenox 120 mg twice daily along with warfarin 5 mg in the evening.  Patient to have repeat INR on Monday outpatient.  Patient to follow-up with neurology and PCP as scheduled.  Follow with Cardiology as scheduled.  Continue medications per med rec.  Return to emergency department for any worsening symptoms.

## 2023-09-30 NOTE — PLAN OF CARE
"Goal Outcome Evaluation:  Plan of Care Reviewed With: patient           Outcome Evaluation: PT evaluation completed this am with pt presenting supine in bed on room air and A & O x 4. His O2 sat was 98% prior to assessment. Pt c/o pain in his back and neck that is chronic to him at 3/10. Pt had c/o \"numbness\" i the fingers of the R hand, lateral aspect of lower leg and L foot when compared to the L. BLE strength found to be equal Bilaterally. Pt amb without any assistive device for 120 ft with some fatigue and O2 sat post amb at 99%. Pt was able to take backwards steps and side steps to either side x 4 without any loss of balance. Pt presents very close to his base line of function and no deficits that would need continued PT intervention found. Therapist did recommend to pt to instal grab bars in shower for safety. Pt to be d/c this time from PT services without any further PT recommendations and pt verbalized understanding of evaluation and the results.      Anticipated Discharge Disposition (PT): home  "

## 2023-09-30 NOTE — OUTREACH NOTE
Prep Survey      Flowsheet Row Responses   [facility] facility patient discharged from? [place]   Is LACE score < 7 ? Yes   Eligibility [facility]   Date of Admission 09/29/23   Date of Discharge 09/30/23   Discharge Disposition Home or Self Care   Discharge diagnosis Ischemic stroke   Does the patient have one of the following disease processes/diagnoses(primary or secondary)? Stroke   Does the patient have Home health ordered? No   Is there a DME ordered? Yes   What DME was ordered? CPAP (check current machine in home)---[facility]   Medication alerts for this patient see AVS   Prep survey completed? Yes            Snehal ROMERO - Registered Nurse

## 2023-09-30 NOTE — THERAPY DISCHARGE NOTE
Patient Name: Matt Bueno  : 1970    MRN: [id number]                              Today's Date: 2023       Admit Date: 2023    Visit Dx:     ICD-10-CM ICD-9-CM   1. Ischemic stroke  I63.9 434.91   2. Acute CVA (cerebrovascular accident)  I63.9 434.91     Patient Active Problem List   Diagnosis    LBP (low back pain)    Adiposity    POOJA on CPAP    Screen for colon cancer    Morbidly obese    Cardiomyopathy    Dilated cardiomyopathy    Chronic systolic congestive heart failure    Acute CVA (cerebrovascular accident)    Chronic systolic CHF (congestive heart failure)    History of cardiac defibrillator placement    Ischemic stroke    Chronic HFrEF (heart failure with reduced ejection fraction)     Past Medical History:   Diagnosis Date    Abnormal ECG 2018    ?    Acute CVA (cerebrovascular accident) 2023    Back pain     WHOLE BACK - LEFT LEG PAIN     Bilateral arm pain     FROM NECK ISSUES     CHF (congestive heart failure)     Congenital heart disease 12/10/2018    ?    Enlarged heart     GERD (gastroesophageal reflux disease)     Hypertension     Left leg pain     FROM BACK ISSUES    Neck pain     BILAT ARM PAIN     POOJA on CPAP     compliant with machine     Sciatic nerve pain     Sleep apnea     SOBOE (shortness of breath on exertion)     Wears glasses     readers     Past Surgical History:   Procedure Laterality Date    CARDIAC CATHETERIZATION N/A 2018    Procedure: Left Heart Cath;  Surgeon: Matt Hernandez MD;  Location:  [facility];  Service: Cardiovascular    CARDIAC DEFIBRILLATOR PLACEMENT  2019    CARDIAC ELECTROPHYSIOLOGY PROCEDURE N/A 2019    Procedure: Device Implant- VDI ICD Biotronik;  Surgeon: Romeo Day MD;  Location:  [facility];  Service: Cardiology    ENDOSCOPY      GASTRIC SLEEVE LAPAROSCOPIC      KNEE ARTHROSCOPY Left     SHOULDER ARTHROSCOPY Bilateral     both shoulder in the past    WISDOM  TOOTH EXTRACTION      aLL 4 REMOVED       General Information       Row Name 09/30/23 1103          Physical Therapy Time and Intention    Document Type discharge evaluation/summary  -TW     Mode of Treatment physical therapy  -TW       Row Name 09/30/23 1103          General Information    Patient Profile Reviewed yes  -TW     Prior Level of Function independent:;all household mobility;community mobility;driving  pt has no assistive devices at home and was independent with all ADLs prior to admission.  -TW     Existing Precautions/Restrictions cardiac;pacemaker  -TW     Barriers to Rehab medically complex  -TW       Row Name 09/30/23 1103          Living Environment    People in Home spouse;child(jose), dependent  -TW       Row Name 09/30/23 1103          Home Main Entrance    Number of Stairs, Main Entrance one  -TW     Stair Railings, Main Entrance none  -TW       Row Name 09/30/23 1103          Stairs Within Home, Primary    Number of Stairs, Within Home, Primary other (see comments)  -TW     Stairs Comment, Within Home, Primary pt has a split level home on 3 levels. The rail is on the R side. Pt would become short of breath sometimes on steps but was able to ascend/descend by himself using rail  -TW       Row Name 09/30/23 1103          Cognition    Orientation Status (Cognition) oriented x 4  -TW       Row Name 09/30/23 1103          Safety Issues, Functional Mobility    Safety Issues Affecting Function (Mobility) safety precautions follow-through/compliance  -TW     Impairments Affecting Function (Mobility) sensation/sensory awareness;pain  -TW               User Key  (r) = Recorded By, (t) = Taken By, (c) = Cosigned By      Initials Name Provider Type    TW Shari Prince, PT Physical Therapist                   Mobility       Row Name 09/30/23 1103          Bed Mobility    Bed Mobility supine-sit  -TW     Supine-Sit [place] (Bed Mobility) modified independence  -TW     Assistive Device (Bed Mobility)  head of bed elevated  -TW       Row Name 09/30/23 1103          Transfers    Comment, (Transfers) gt belt in use for all mobility  -TW       Row Name 09/30/23 1103          Bed-Chair Transfer    Bed-Chair [place] (Transfers) modified independence  -TW     Assistive Device (Bed-Chair Transfers) other (see comments)  -TW       Row Name 09/30/23 1103          Sit-Stand Transfer    Sit-Stand [place] (Transfers) modified independence  -TW     Assistive Device (Sit-Stand Transfers) other (see comments)  -TW       Row Name 09/30/23 1103          Gait/Stairs (Locomotion)    [place] Level (Gait) supervision  -TW     Assistive Device (Gait) other (see comments)  -TW     Distance in Feet (Gait) 120 ft  -TW     Deviations/Abnormal Patterns (Gait) other (see comments)  -TW     Comment, (Gait/Stairs) pt has a lateral wt shift and has less stance time on LLE due to L knee pain that is chronic.  -TW               User Key  (r) = Recorded By, (t) = Taken By, (c) = Cosigned By      Initials Name Provider Type    TW Shari Prince PT Physical Therapist                   Obj/Interventions       Row Name 09/30/23 1103          Range of Motion Comprehensive    Comment, General Range of Motion grossly WFLS with soft tissue limiting hip flexion.  -       Row Name 09/30/23 1103          Strength Comprehensive (MMT)    Comment, General Manual Muscle Testing (MMT) Assessment BLEs were found to be equal in strength with B hip flexion/abd  4-/5 and hip add 4+/5 and knee ext 5/5, dorsiflexion 5/5. Only R  found to be less than L for UEs.  -TW       Row Name 09/30/23 1103          Motor Skills    Additional Documentation Advanced Stepping/Walking Interventions (Group)  -       Row Name 09/30/23 1103          Balance    Balance Assessment sitting static balance;sitting dynamic balance;sit to stand dynamic balance;standing static balance;standing dynamic balance  -TW     Static Sitting Balance modified independence  -TW      "Dynamic Sitting Balance modified independence  -TW     Position, Sitting Balance unsupported;sitting edge of bed  -TW     Sit to Stand Dynamic Balance modified independence  -TW     Static Standing Balance modified independence  -TW     Dynamic Standing Balance modified independence  -TW     Position/Device Used, Standing Balance unsupported  -TW       Row Name 09/30/23 1103          Advanced Stepping/Walking Interventions    Stepping/Walking Interventions backward walking;side stepping  -TW     Backward Walking (Stepping/Walking Interventions) 4 steps without loss of balance or HHA  -TW     Side Stepping (Stepping/Walking Interventions) 4 steps each direction without loss of balance or HHA  -TW               User Key  (r) = Recorded By, (t) = Taken By, (c) = Cosigned By      Initials Name Provider Type    TW Shari Prince, PT Physical Therapist                   Goals/Plan    No documentation.                  Clinical Impression       Row Name 09/30/23 1103          Pain    Pretreatment Pain Rating 3/10  -TW     Posttreatment Pain Rating 3/10  -TW     Pain Location - Side/Orientation Bilateral  -TW     Pain Location posterior  -TW     Pain Location - back;neck  -TW     Pre/Posttreatment Pain Comment This pain is chronic for pt. Pt also states he does have some L knee pain during ambulation but this pain is also chronic and not rated.  -TW     Pain Intervention(s) Repositioned  -TW       Row Name 09/30/23 1103          Plan of Care Review    Plan of Care Reviewed With patient  -TW     Outcome Evaluation PT evaluation completed this am with pt presenting supine in bed on room air and A & O x 4. His O2 sat was 98% prior to assessment. Pt c/o pain in his back and neck that is chronic to him at 3/10. Pt had c/o \"numbness\" i the fingers of the R hand, lateral aspect of lower leg and L foot when compared to the L. BLE strength found to be equal Bilaterally. Pt amb without any assistive device for 120 ft with some fatigue " and O2 sat post amb at 99%. Pt was able to take backwards steps and side steps to either side x 4 without any loss of balance. Pt presents very close to his base line of function and no deficits that would need continued PT intervention found. Therapist did recommend to pt to instal grab bars in shower for safety. Pt to be d/c this time from PT services without any further PT recommendations and pt verbalized understanding of evaluation and the results.  -TW       Row Name 09/30/23 1103          Therapy Assessment/Plan (PT)    Patient/Family Therapy Goals Statement (PT) to return home  -TW     Criteria for Skilled Interventions Met (PT) no problems identified which require skilled intervention  -TW       Row Name 09/30/23 1103          Vital Signs    Pre SpO2 (%) 98  -TW     O2 Delivery Pre Treatment room air  -TW     Post SpO2 (%) 99  -TW     O2 Delivery Post Treatment room air  -TW     Pre Patient Position Supine  -TW     Intra Patient Position Standing  -TW     Post Patient Position Sitting  -TW       Row Name 09/30/23 1103          Positioning and Restraints    Pre-Treatment Position in bed  -TW     Post Treatment Position bed  -TW     In Bed sitting EOB;with family/caregiver;notified nsg  -TW               User Key  (r) = Recorded By, (t) = Taken By, (c) = Cosigned By      Initials Name Provider Type    TW Shari Prince, PT Physical Therapist                   Outcome Measures       Row Name 09/30/23 1104 09/30/23 0800       How much help from another person do you currently need...    Turning from your back to your side while in flat bed without using bedrails? 4  -TW 4  -EW    Moving from lying on back to sitting on the side of a flat bed without bedrails? 4  -TW 4  -EW    Moving to and from a bed to a chair (including a wheelchair)? 4  -TW 4  -EW    Standing up from a chair using your arms (e.g., wheelchair, bedside chair)? 4  -TW 4  -EW    Climbing 3-5 steps with a railing? 4  -TW 3  -EW    To walk in  "hospital room? 4  -TW 4  -EW    AM-PAC 6 Clicks Score (PT) 24  -TW 23  -EW    Highest level of mobility 8 --> Walked 250 feet or more  -TW 7 --> Walked 25 feet or more  -EW      Row Name 09/30/23 1104          Functional Assessment    Outcome Measure Options AM-PAC 6 Clicks Basic Mobility (PT)  -TW               User Key  (r) = Recorded By, (t) = Taken By, (c) = Cosigned By      Initials Name Provider Type    TW Shari Prince, SHENG Physical Therapist    Janice Abdi RN Registered Nurse                    PT Recommendation and Plan     Plan of Care Reviewed With: patient  Outcome Evaluation: PT evaluation completed this am with pt presenting supine in bed on room air and A & O x 4. His O2 sat was 98% prior to assessment. Pt c/o pain in his back and neck that is chronic to him at 3/10. Pt had c/o \"numbness\" i the fingers of the R hand, lateral aspect of lower leg and L foot when compared to the L. BLE strength found to be equal Bilaterally. Pt amb without any assistive device for 120 ft with some fatigue and O2 sat post amb at 99%. Pt was able to take backwards steps and side steps to either side x 4 without any loss of balance. Pt presents very close to his base line of function and no deficits that would need continued PT intervention found. Therapist did recommend to pt to instal grab bars in shower for safety. Pt to be d/c this time from PT services without any further PT recommendations and pt verbalized understanding of evaluation and the results.     Time Calculation:   PT Evaluation Complexity  History, PT Evaluation Complexity: 3 or more personal factors and/or comorbidities  Examination of Body Systems (PT Eval Complexity): total of 4 or more elements  Clinical Presentation (PT Evaluation Complexity): stable  Clinical Decision Making (PT Evaluation Complexity): low complexity  Overall Complexity (PT Evaluation Complexity): low complexity     PT Charges       Row Name 09/30/23 1103             Time " Calculation    Stop Time 1103  -TW      PT Received On 09/30/23 -TW                User Key  (r) = Recorded By, (t) = Taken By, (c) = Cosigned By      Initials Name Provider Type    Shari Damon PT Physical Therapist                  Therapy Charges for Today       Code Description Service Date Service Provider Modifiers Qty    95694417854 HC PT EVAL LOW COMPLEXITY 3 9/30/2023 Shari Prince PT GP 1            PT G-Codes  Outcome Measure Options: AM-PAC 6 Clicks Basic Mobility (PT)  AM-PAC 6 Clicks Score (PT): 24    PT Discharge Summary  Anticipated Discharge Disposition (PT): home  Reason for Discharge: At baseline function  Discharge Destination: Home    Shari Prince PT  9/30/2023

## 2023-09-30 NOTE — CASE MANAGEMENT/SOCIAL WORK
Case Management Discharge Note                Selected Continued Care - Admitted Since 9/29/2023       Destination    No services have been selected for the patient.                Durable Medical Equipment Coordination complete.      Service Provider Selected Services Address Phone Fax Patient Preferred    [facility] - [place] Durable Medical Equipment [address] [facility] [id number] [phone] --       Internal Comment last updated by Mckayla Hickman MSW 9/30/2023 0969    CPAP                          Dialysis/Infusion    No services have been selected for the patient.                Home Medical Care    No services have been selected for the patient.                Therapy    No services have been selected for the patient.                Community Resources    No services have been selected for the patient.                Community & DME    No services have been selected for the patient.                    Transportation Services  Private: Car    Final Discharge Disposition Code: 01 - home or self-care

## 2023-09-30 NOTE — CASE MANAGEMENT/SOCIAL WORK
Discharge Planning Assessment   Ronni     Patient Name: Matt Bueno  MRN: [id number]  Today's Date: 9/30/2023    Admit Date: 9/29/2023    Plan: DCP   Discharge Needs Assessment       Row Name 09/30/23 0919       Living Environment    People in Home child(jose), dependent;spouse    Current Living Arrangements home    Potentially Unsafe Housing Conditions none    Primary Care Provided by spouse/significant other;self    Provides Primary Care For no one, unable/limited ability to care for self    Family Caregiver if Needed spouse    Quality of Family Relationships involved;helpful    Able to Return to Prior Arrangements yes       Resource/Environmental Concerns    Resource/Environmental Concerns none    Transportation Concerns none       Transition Planning    Patient/Family Anticipates Transition to home    Patient/Family Anticipated Services at Transition none    Transportation Anticipated family or friend will provide       Discharge Needs Assessment    Readmission Within the Last 30 Days no previous admission in last 30 days    Equipment Currently Used at Home cpap    Concerns to be Addressed discharge planning    Anticipated Changes Related to Illness none                   Discharge Plan       Row Name 09/30/23 0919       Plan    Plan DCP    Patient/Family in Agreement with Plan yes    Plan Comments SW met with pt at bedside. Pt reports he lives with spouse and child in [place]. Pt is independent with ADL's and still drives. Pt confirmed PCP. Pt uses [facility]. Pt reports he does not use any DME for mobility. Pt has CPAP through [employer] [phone]. Pt is supposed to use nightly, but would like it checked. Office is closed this weekend but can follow up on monday. Goal is home with spouse to transport. RN states pt will not qualify for home O2 and to continue using CPAP at night. SW/CM will continue to follow for dc needs.    Final Discharge Disposition Code 01 - home or self-care                   Continued Care and Services - Admitted Since 9/29/2023       Durable Medical Equipment Coordination complete.      Service Provider Request Status Selected Services Address Phone Fax Patient Preferred    [facility] - [facility] Durable Medical Equipment [address] [facility] [id number] [phone] --       Internal Comment last updated by Mckayla Hickman MSW 9/30/2023 0925    CPAP                              Expected Discharge Date and Time       Expected Discharge Date Expected Discharge Time    Sep 30, 2023            Demographic Summary       Row Name 09/30/23 0918       General Information    Admission Type observation    Arrived From home    Required Notices Provided Observation Status Notice    Referral Source admission list    Reason for Consult discharge planning    Preferred Language English                   Functional Status       Row Name 09/30/23 0918       Functional Status, IADL    Medications independent    Meal Preparation independent    Housekeeping independent    Laundry independent    Shopping independent       Mental Status Summary    Recent Changes in Mental Status/Cognitive Functioning unable to assess       Employment/    Employment Status disabled                   Psychosocial       Row Name 09/30/23 0919       Developmental Stage (Eriksson's)    Developmental Stage Stage 7 (35-65 years/Middle Adulthood) Generativity vs. Stagnation                   Abuse/Neglect    No documentation.                  Legal    No documentation.                  Substance Abuse    No documentation.                  Patient Forms    No documentation.                     ALEXANDRIA Levy

## 2023-09-30 NOTE — PHARMACY RECOMMENDATION
"Pharmacy Consult  -  Warfarin    Matt Bueno is a  52 y.o. male , pharmacy consulted for warfarin dosing  Height - 165 cm (64.96\")  Weight - (!) 175 kg (385 lb 5.8 oz)    Consulting Provider: - ALFREDA [place]  Indication: - dilated left atrium with possible cryptogenic stroke, full anticoagulation required  Goal INR: -  2-3  Home Regimen:  New start    Bridge Therapy: Yes   Enoxaparin 0.7 mg/kg subq q 12 hrs    Drug-Drug Interactions with current regimen:  Tramadol may increase anticoagulant effect of warfarin    Warfarin Dosing During Admission:    Date  9/30           INR  0.97           Dose  5 mg              Patient Education:  Warfarin education provided on 9/30 verbally and in writing.  Discussed effects of warfarin, importance of checking INR, drug-drug and drug-food interactions, and signs/symptoms of bleeding and clotting.  Patient verbalized understanding through teach back.  All pertinent questions were answered.      Labs:  Results from last 7 days   Lab Units 09/30/23  0633 09/29/23  1319   INR  0.97 0.94   APTT seconds 27.0  --    HEMOGLOBIN g/dL 10.8* 11.4*   HEMATOCRIT % 35.0* 35.5*   PLATELETS 10*3/mm3 233 246     Results from last 7 days   Lab Units 09/29/23  1319   SODIUM mmol/L 140   POTASSIUM mmol/L 4.6   CHLORIDE mmol/L 104   CO2 mmol/L 25.0   BUN mg/dL 13   CREATININE mg/dL 0.60*   CALCIUM mg/dL 8.7   BILIRUBIN mg/dL 0.4   ALK PHOS U/L 53   ALT (SGPT) U/L 10   AST (SGOT) U/L 10   GLUCOSE mg/dL 130*       Current dietary intake:   Diet Order   Procedures    Diet: Cardiac Diets; Healthy Heart (2-3 Na+); Texture: Regular Texture (IDDSI 7); Fluid Consistency: Thin (IDDSI 0)       Assessment/Plan:     Warfarin being dosed for dilated left atrium with possible cryptogenic stroke with INR goal 2.0 to 3.0.     Patient's INR is subtherapeutic today (new start).  Will give warfarin 5 mg today. Patient will be continued on enoxaparin 0.7 mg/kg subq q 12 hrs until INR > 2.0.  Daily PT/INR " ordered.  Monitor signs/symptoms of bleeding, dietary intake, and drug-drug interactions. Make dose adjustments as necessary.  Pharmacy will continue to follow.      Thank you for the opportunity to consult on this patient.    Ayaan Weiss RPH, Pharm.D.  09/30/23  13:04 EDT

## 2023-09-30 NOTE — CASE MANAGEMENT/SOCIAL WORK
Case Management/Social Work    Patient Name:  Matt Bueno  YOB: 1970  MRN: [id number]  Admit Date:  9/29/2023        SW received HH order. Pt will not qualify for HH services under debility and weakness. MAYA updated provider and RN. Pt did well with therapy today and might be more appropriate for OP therapy, just will need script w/ AVS for pt to take to facility of his choice.       Electronically signed by:  ALEXANDRIA Levy  09/30/23 12:18 EDT

## 2023-09-30 NOTE — DISCHARGE SUMMARY
[facility]      Name:  Matt Bueno   Age:  52 y.o.  Sex:  male  :  1970  MRN:  [id number]   Visit Number:  [id number]    Admission Date:  2023  Date of Discharge:  2023  Primary Care Physician:  Stas Rust MD    Important issues to note:    -Patient admitted due to right-sided numbness and weakness.  -Given AICD unable to perform MRI.  -Perfusion study did note hypoperfusion and left middle cerebral artery.  -Initially started on Plavix and aspirin.  -However, given history of cardiomyopathy at risk for cryptogenic stroke per neurology.  Per neurology recommendations initiated on Coumadin with Lovenox bridge.  -Patient to have INR rechecked on Monday outpatient order placed.  -To follow with PCP on Tuesday.  -Strict return precautions given.  -Follow-up with cardiology for possible Watchman device.    Discharge Diagnoses:     Acute CVA, likely left MCA territory, POA  History of Cardiomyopathy  ICD placement  Chronic congestive heart failure with EF-30%  Lower extremity edema  POOJA with Cpap  Morbid obesity  History of gastric sleeve  Chronic pain syndrome    Problem List:     Active Hospital Problems    Diagnosis  POA    **Ischemic stroke [I63.9]  Yes    Chronic HFrEF (heart failure with reduced ejection fraction) [I50.22]  Yes    Acute CVA (cerebrovascular accident) [I63.9]  Yes    Chronic systolic CHF (congestive heart failure) [I50.22]  Yes    History of cardiac defibrillator placement [Z95.810]  Yes      Resolved Hospital Problems   No resolved problems to display.     Presenting Problem:    Chief Complaint   Patient presents with    Numbness      Consults:     Consulting Physician(s)               None            Procedures Performed:        History of presenting illness/Hospital Course:    Patient is a 52 year old male who presented to the hospital 2023 with complaints of right arm and leg weakness and right facial numbness  that were present when he awakened.  Patient did note that he was in normal state of health around 4am when waking to go to the bathroom.  NIH score was 4 on arrival.  Patient with past health history significant for morbid obesity, cardiomyopathy status post ICD placement, chronic systolic heart failure with EF-30%, POOJA with Cpap, history of gastric sleeve and chronic pain syndrome.  Noted that patient's defibrillator is not MRI compatible.  Patient was not a candidate for thrombolytics.  Patient is disabled and lives at home with his wife.  No assistive devices.     Work up in the emergency department noted unremarkable CMP and CBC with H/H-11/35.  CXR with no acute process noted.  Vital signs within normal limits on arrival.  CT head with no acute process, CTA head/ neck noted 50-60% stenosis left ICA.  Perfusion studies noted 6ml area of hypoperfusion in the distribution of the left middle cerebral artery.  Neurology was contacted in the emergency department.  Hospitalist was contacted for admission.  Patient was initially placed on aspirin and Plavix as well as statin.  Echo noted left atrial cavity moderate to severely dilated with underlying history of cardiomyopathy per neurology puts him at high risk of cryptogenic stroke.  Per neurology recommendations patient placed on Coumadin with bridge of Lovenox until INR 2-3.  No prior history of GI bleed.  Patient right-sided deficits noted to improve.  Patient otherwise with reassuring labs and vitals noted.  Per neurology patient will follow-up outpatient with cardiology as he would be a good candidate for a Watchman device.  Patient will be discharged home on Lovenox and Coumadin with pharmacy aiding in initial dosing.  Risk and benefits of warfarin/Lovenox discussed with patient.  Patient will also follow with outpatient PT.  Patient will have his INR/PT rechecked in 2 days outpatient with order placed.  Strict return precautions given.  Patient does already  have secured follow-up with PCP in 3 days.    Vital Signs:    Temp:  [97.1 °F (36.2 °C)-97.9 °F (36.6 °C)] 97.7 °F (36.5 °C)  Heart Rate:  [66-81] 66  Resp:  [18-20] 18  BP: (100-137)/(47-81) 105/54    Physical Exam:    General Appearance:  Alert and cooperative.  Chronically ill middle-aged male.   Head:  Atraumatic and normocephalic.  Morbidly obese.   Eyes: Conjunctivae and sclerae normal, no icterus. No pallor.   Ears:  Ears with no abnormalities noted.   Throat: No oral lesions, no thrush, oral mucosa moist.   Neck: Supple, trachea midline, no thyromegaly.   Back:   No kyphoscoliosis present. No tenderness to palpation.   Lungs:   Breath sounds heard bilaterally equally.  No crackles or wheezing. No Pleural rub or bronchial breathing.  On 2 L nasal cannula unlabored.   Heart:  Normal S1 and S2, no murmur, no gallop, no rub. No JVD.   Abdomen:   Normal bowel sounds, no masses, no organomegaly. Soft, nontender, nondistended, no rebound tenderness.   Extremities: Supple, trace bilateral lower extremity edema, no cyanosis, no clubbing.  Right upper and lower sensory deficit present with no obvious weakness or focal deficit.   Pulses: Pulses palpable bilaterally.   Skin: No bleeding or rash.   Neurologic: Alert and oriented x 3. No facial asymmetry. Moves all four limbs. No tremors.  No drift noted.     Pertinent Lab Results:     Results from last 7 days   Lab Units 09/29/23  1319   SODIUM mmol/L 140   POTASSIUM mmol/L 4.6   CHLORIDE mmol/L 104   CO2 mmol/L 25.0   BUN mg/dL 13   CREATININE mg/dL 0.60*   CALCIUM mg/dL 8.7   BILIRUBIN mg/dL 0.4   ALK PHOS U/L 53   ALT (SGPT) U/L 10   AST (SGOT) U/L 10   GLUCOSE mg/dL 130*     Results from last 7 days   Lab Units 09/30/23  0633 09/29/23  1319   WBC 10*3/mm3 7.59 9.41   HEMOGLOBIN g/dL 10.8* 11.4*   HEMATOCRIT % 35.0* 35.5*   PLATELETS 10*3/mm3 233 246     Results from last 7 days   Lab Units 09/30/23  0633 09/29/23  1319   INR  0.97 0.94                                Pertinent Radiology Results:    Imaging Results (All)       Procedure Component Value Units Date/Time    US Carotid Bilateral [[id number]] Collected: 09/29/23 1615     Updated: 09/29/23 1620    Narrative:      PROCEDURE: US CAROTID BILATERAL-     HISTORY: left ICA blockage; I63.9-Cerebral infarction, unspecified     Technique: Real-time imaging was performed of the extracranial carotid  arteries in transverse and longitudinal planes, with color duplex  evaluation of blood flow velocity. Spectral analysis was performed. The  cervicovertebral arteries were also examined. Stenosis evaluation is  based on validated velocity criteria.        Right carotid system:  CCA: -116 cm/s  ICA:  96.5 cm/s  ECA: 99.2 cm/s  Vertebral artery: Antegrade  ICA/CCA ratio: 0.83  No plaque is identified at the bifurcation.     Left carotid system:  CCA: 93.8 cm/s  ICA: 77.5 cm/s   ECA: 79.5 cm/s  Vertebral artery: Antegrade  ICA/CCA ratio: 0.83  Mild calcified plaque is identified at the bifurcation.             Impression:      Mild plaque within the left carotid bulb causing 50% stenosis or less.     No plaque identified in the right carotid bulb, no stenosis seen.        This report was signed and finalized on 9/29/2023 4:18 PM by Stacy Fernandez MD.       XR Chest 1 View [[id number]] Collected: 09/29/23 1436     Updated: 09/29/23 1446    Narrative:      PROCEDURE: XR CHEST 1 VW-        HISTORY: Stroke Protocol (Onset > 12 hrs)     COMPARISON: April 2019.     FINDINGS: Apical lordotic view. The heart is borderline in size. The  mediastinum is unremarkable. The lungs are clear. There is no acute  infiltrate. There is no pneumothorax. There are no acute osseous  abnormalities. A pacemaker overlies the left chest.           Impression:      No acute cardiopulmonary process.                       Images were reviewed, interpreted, and dictated by Dr. Stacy Fernandez MD  Transcribed by Courtney Mccord PA-C.     This report was signed and  finalized on 9/29/2023 2:43 PM by Stacy Fernandez MD.       CT Angiogram Neck [[id number]] Collected: 09/29/23 1424     Updated: 09/29/23 1427    Narrative:      PROCEDURE: CT ANGIOGRAM NECK-     HISTORY:     TECHNIQUE: Thin-section axial images of the neck were performed after  the administration of 100 mL of Isovue 370 intravenously. 3D MIP images  were performed and reviewed. NASCET technique is utilized for stenosis  evaluation. This study was performed with techniques to keep radiation  doses as low as reasonably achievable, (ALARA). Individualized dose  reduction techniques using automated exposure control or adjustment of  mA and/or kV according to the patient size were employed.     FINDINGS:  The lung apices demonstrate no abnormalities. The thyroid gland is  unremarkable. The visualized cervical spine demonstrates no  abnormalities. The visualized paranasal sinuses are clear.      Aortic arch: The aortic arch has a normal contour.     Right carotid:  No significant stenosis is seen of the cervical common  or internal carotid artery. Right carotid artery is tortuous.     Left carotid: There is mild calcified plaque causing 50 to 60% stenosis.  Left carotid artery is tortuous.     Vertebrals: Right vertebral artery is dominant. No significant stenosis  is present.       Impression:      50 to 60% stenosis left internal carotid artery.     CTDI:  DLP:3592.08 mGy.cm     This report was signed and finalized on 9/29/2023 2:25 PM by Stacy Fernandez MD.       CT Angiogram Head w AI Analysis of LVO [[id number]] Collected: 09/29/23 1415     Updated: 09/29/23 1423    Narrative:      PROCEDURE: CT ANGIOGRAM HEAD W AI ANALYSIS OF LVO-     HISTORY: rue weakness, right sided numbness     TECHNIQUE: Thin section axial CT with IV contrast supplemented with  multiplanar 3 D reconstructions of the head and neck. This study was  performed with techniques to keep radiation doses as low as reasonably  achievable, (ALARA).  Individualized dose reduction techniques using  automated exposure control or adjustment of mA and/or kV according to  the patient size were employed.     FINDINGS:     Head CT: The ventricles are proper size. There is no evidence of  hemorrhage. No masses are identified.  No extra-axial fluid is seen. The  sinuses are unremarkable.     CTA:     Aortic arch:  Arch shows no significant narrowing. Great vessel origins  are widely patent. Technique is suboptimal secondary to patient's body  habitus. Patient was scanned 3 times for the CTA perfusion due to short  off of the unit. Lungs are clear except for azygos lobe, normal variant.  Streak artifact from pacemaker identified on the left.     Right carotid:  No significant stenosis is seen of the cervical common  or internal carotid artery. Right carotid artery is very tortuous.     Left carotid: There is mild calcified plaque at the left carotid bulb  causing 50 to 60% stenosis. Left internal and common carotid arteries  are very tortuous.     Vertebrals: The vertebrals are patent. No significant stenosis is  present. System is right vertebral dominant.     The cranial circulation is unremarkable. There is no significant  stenosis, aneurysm, or occlusion.       Impression:      50 to 60% stenosis left ICA.           CTDI:  DLP:3592.08 mGy.cm        This report was signed and finalized on 9/29/2023 2:20 PM by Stacy Fernandez MD.       CT CEREBRAL PERFUSION WITH & WITHOUT CONTRAST [[id number]] Collected: 09/29/23 1413     Updated: 09/29/23 1417    Narrative:      PROCEDURE: CT CEREBRAL PERFUSION W WO CONTRAST-     HISTORY: Neuro deficit, acute, stroke suspected     COMPARISON:  None .     TECHNIQUE: CT perfusion was performed following the administration of  Isovue-300 contrast.     FINDINGS:     TOTAL HYPOPERFUSION: Using the threshold of Tmax greater than six  seconds, there is an area of hypoperfusion in the left MCA territory  with a total volume of hyperperfusion of 6  mL.     CORE INFARCT: Using the threshold of CBF less than 30%, there is no area  of ischemic core in the left MCA territory with a total volume of  ischemic core of 0  mL.     PENUMBRA: The penumbra volume (mismatch volume) is 6 mL. The mismatch  ratio is infinite.       Impression:      6 mL area of hypoperfusion in the distribution of the left  middle cerebral artery..              CTDI:  DLP:3592.08 mGy.cm        This study was performed with techniques to keep radiation doses as low  as reasonably achievable (ALARA). Individualized dose reduction  techniques using automated exposure control or adjustment of mA and/or  kV according to the patient size were employed.     This report was signed and finalized on 9/29/2023 2:14 PM by Stacy Fernandez MD.       CT Head Without Contrast Stroke Protocol [[id number]] Collected: 09/29/23 1305     Updated: 09/29/23 1310    Narrative:      PROCEDURE: CT HEAD WO CONTRAST STROKE PROTOCOL-     HISTORY: Stroke, follow up     COMPARISON: None.     TECHNIQUE: Multiple axial CT images were performed from the foramen  magnum to the vertex. Individualized dose reduction techniques using  automated exposure control or adjustment of mA and/or kV according to  the patient size were employed.     FINDINGS: The patient was scanned twice secondary to significant motion  artifact on the first scan. The brain parenchyma is unremarkable.  The  ventricles are proper size. There is no evidence of hemorrhage. No  masses are identified. No abnormal extra-axial fluid is seen. The  paranasal sinuses and mastoid air cells are unremarkable.       Impression:      No acute intracranial process.                       This report was signed and finalized on 9/29/2023 1:08 PM by Stacy Fernandez MD.               Echo:    Results for orders placed during the hospital encounter of 09/29/23    Adult Transthoracic Echo Complete W/ Cont if Necessary Per Protocol (With Agitated Saline)    Interpretation Summary     Left ventricular ejection fraction appears to be 36 - 40%.    The left ventricular cavity is moderately dilated.    Left ventricular diastolic function is consistent with (grade II w/high LAP) pseudonormalization.    The left atrial cavity is moderate to severely dilated.    Left atrial volume is mildly increased.    Saline test results are negative.    Estimated right ventricular systolic pressure from tricuspid regurgitation is normal (<35 mmHg).    Condition on Discharge:      Stable.    Code status during the hospital stay:    Code Status and Medical Interventions:   Ordered at: 09/30/23 0857     Level Of Support Discussed With:    Patient     Code Status (Patient has no pulse and is not breathing):    CPR (Attempt to Resuscitate)     Medical Interventions (Patient has pulse or is breathing):    Full Support     Discharge Disposition:    Home-Health Care [facility]    Discharge Medications:       Discharge Medications        New Medications        Instructions Start Date   atorvastatin 80 MG tablet  Commonly known as: LIPITOR   80 mg, Oral, Nightly      Enoxaparin Sodium 120 MG/0.8ML solution prefilled syringe syringe  Commonly known as: LOVENOX   0.7 mg/kg (120 mg), Subcutaneous, Every 12 Hours      warfarin 5 MG tablet  Commonly known as: Coumadin   Take 5 mg every evening             Changes to Medications        Instructions Start Date   furosemide 40 MG tablet  Commonly known as: LASIX  What changed:   when to take this  reasons to take this   40 mg, Oral, Daily      sacubitril-valsartan  MG tablet  Commonly known as: ENTRESTO  What changed: additional instructions   1 tablet, Oral, 2 Times Daily, Hold if SBP < 100             Continue These Medications        Instructions Start Date   carvedilol 12.5 MG tablet  Commonly known as: COREG   12.5 mg, Oral, 2 Times Daily With Meals      HYDROcodone-acetaminophen  MG per tablet  Commonly known as: NORCO   1 tablet, Oral, Every 6 Hours      omeprazole 20 MG  capsule  Commonly known as: priLOSEC   20 mg, Oral, Daily      traMADol 50 MG tablet  Commonly known as: ULTRAM   50 mg, Oral, Every 12 Hours             Discharge Diet:     Diet Instructions       Diet: Cardiac Diets, Fluid Restriction (240 mL/tray) Diets; Healthy Heart (2-3 Na+); Regular Texture (IDDSI 7); Thin (IDDSI 0); 1500 mL/day      Discharge Diet:  Cardiac Diets  Fluid Restriction (240 mL/tray) Diets       Cardiac Diet: Healthy Heart (2-3 Na+)    Texture: Regular Texture (IDDSI 7)    Fluid Consistency: Thin (IDDSI 0)    Fluid Restriction Diet (240 mL/tray): 1500 mL/day          Activity at Discharge: As tolerated      Follow-up Appointments:    Additional Instructions for the Follow-ups that You Need to Schedule       Ambulatory Referral to Home Health ([facility])   As directed      Face to Face Visit Date: 9/30/2023   Follow-up provider for Plan of Care?: I treated the patient in an acute care facility and will not continue treatment after discharge.   Follow-up provider: STAS RUST [[id number]]   Reason/Clinical Findings: debility   Describe mobility limitations that make leaving home difficult: right sided weakness   Nursing/Therapeutic Services Requested: Skilled Nursing Physical Therapy Occupational Therapy   Skilled nursing orders: Other (INR/PT) CHF management Medication education   PT orders: Therapeutic exercise Gait Training Strengthening Home safety assessment   Weight Bearing Status: As Tolerated   Occupational orders: Activities of daily living Strengthening Fine motor Home safety assessment        Ambulatory Referral to Physical Therapy Evaluate and treat   As directed      Specialty needed: Evaluate and treat   Follow-up needed: Yes        Protime-INR    Oct 02, 2023 (Approximate)      Is Patient on anti-coag: Yes   Release to patient: Routine Release               Contact information for follow-up providers       Stas Rust MD Follow up.    Specialty: Family Medicine  Why: Must have  appointment on Tuesday for repeat INR  Contact information:  [address]  [facility] [address]  [phone]               [facility] NEUROLOGY Follow up in 1 month(s).    Specialty: Neurology  Contact information:  [address]  [facility] [address] [phone]  Additional information:  Phone Number: [phone]      Back Entrance of Main Hospital- Take elevators to the right located upon entrance to the second floor.             Matt Hernandez MD Follow up in 1 week(s).    Specialty: Cardiology  Contact information:  [address]  [address]  [facility] [address]  [phone]                       Contact information for after-discharge care       Durable Medical Equipment       [facility] .    Service: Durable Medical Equipment  Contact information:  [address]  [facility] [address]  [phone]                                 Future Appointments   Date Time Provider Department Center   10/3/2023 12:30 PM Stas Rust MD [facility]     Test Results Pending at Discharge:    Pending Labs       Order Current Status    Folate In process    Vitamin B12 In process               Odette Cordero, APRN  09/30/23  12:26 EDT    Time: I spent 48 minutes on this discharge activity which included: face-to-face encounter with the patient, reviewing the data in the system, coordination of the care with the nursing staff as well as consultants, documentation, and entering orders.     Dictated utilizing Dragon dictation.

## 2023-09-30 NOTE — PLAN OF CARE
Goal Outcome Evaluation:  Plan of Care Reviewed With: patient               Problem: Adult Inpatient Plan of Care  Goal: Plan of Care Review  9/30/2023 1127 by Janice Mora RN  Outcome: Met  Flowsheets (Taken 9/30/2023 1127)  Progress: improving  Plan of Care Reviewed With: patient  9/30/2023 1126 by Janice Mora RN  Outcome: Ongoing, Progressing  Goal: Patient-Specific Goal (Individualized)  9/30/2023 1127 by Janice Mora RN  Outcome: Met  9/30/2023 1126 by Janice Mora RN  Outcome: Ongoing, Progressing  Goal: Absence of Hospital-Acquired Illness or Injury  9/30/2023 1127 by Janice Mora RN  Outcome: Met  9/30/2023 1126 by Janice Mora RN  Outcome: Ongoing, Progressing  Intervention: Identify and Manage Fall Risk  Recent Flowsheet Documentation  Taken 9/30/2023 0800 by Janice Mora RN  Safety Promotion/Fall Prevention:   safety round/check completed   room organization consistent   nonskid shoes/slippers when out of bed   fall prevention program maintained   clutter free environment maintained   assistive device/personal items within reach   activity supervised  Intervention: Prevent Skin Injury  Recent Flowsheet Documentation  Taken 9/30/2023 0800 by Janice Mora RN  Body Position:   position changed independently   supine, legs elevated  Intervention: Prevent and Manage VTE (Venous Thromboembolism) Risk  Recent Flowsheet Documentation  Taken 9/30/2023 0800 by Janice Mora RN  Activity Management: activity encouraged  Goal: Optimal Comfort and Wellbeing  9/30/2023 1127 by Janice Mora RN  Outcome: Met  9/30/2023 1126 by Janice Mora RN  Outcome: Ongoing, Progressing  Intervention: Provide Person-Centered Care  Recent Flowsheet Documentation  Taken 9/30/2023 0800 by Janice Mora RN  Trust Relationship/Rapport:   care explained   choices provided   questions answered   questions encouraged   thoughts/feelings  acknowledged  Goal: Readiness for Transition of Care  9/30/2023 1127 by Janice Mora RN  Outcome: Met  9/30/2023 1126 by Janice Mora RN  Outcome: Ongoing, Progressing     Problem: Obstructive Sleep Apnea Risk or Actual Comorbidity Management  Goal: Unobstructed Breathing During Sleep  9/30/2023 1127 by Janice Mora RN  Outcome: Met  9/30/2023 1126 by Janice Mora RN  Outcome: Ongoing, Progressing     Problem: Pain Chronic (Persistent) (Comorbidity Management)  Goal: Acceptable Pain Control and Functional Ability  9/30/2023 1127 by Janice Mora RN  Outcome: Met  9/30/2023 1126 by Janice Mora RN  Outcome: Ongoing, Progressing  Intervention: Manage Persistent Pain  Recent Flowsheet Documentation  Taken 9/30/2023 0800 by Janice Mora RN  Medication Review/Management: medications reviewed  Intervention: Optimize Psychosocial Wellbeing  Recent Flowsheet Documentation  Taken 9/30/2023 0800 by Janice Mora RN  Diversional Activities: television  Family/Support System Care:   self-care encouraged   support provided     Problem: Adjustment to Illness (Stroke, Ischemic/Transient Ischemic Attack)  Goal: Optimal Coping  9/30/2023 1127 by Janice Mora RN  Outcome: Met  9/30/2023 1126 by Janice Mora RN  Outcome: Ongoing, Progressing  Intervention: Support Psychosocial Response to Stroke  Recent Flowsheet Documentation  Taken 9/30/2023 0800 by Janice Mora RN  Family/Support System Care:   self-care encouraged   support provided     Problem: Bowel Elimination Impaired (Stroke, Ischemic/Transient Ischemic Attack)  Goal: Effective Bowel Elimination  9/30/2023 1127 by Janice Mora RN  Outcome: Met  9/30/2023 1126 by Janice Mora RN  Outcome: Ongoing, Progressing     Problem: Cerebral Tissue Perfusion (Stroke, Ischemic/Transient Ischemic Attack)  Goal: Optimal Cerebral Tissue Perfusion  9/30/2023 1127 by Janice Mora  RN  Outcome: Met  9/30/2023 1126 by Janice Mora RN  Outcome: Ongoing, Progressing     Problem: Cognitive Impairment (Stroke, Ischemic/Transient Ischemic Attack)  Goal: Optimal Cognitive Function  9/30/2023 1127 by Janice Mora RN  Outcome: Met  9/30/2023 1126 by Janice Mora RN  Outcome: Ongoing, Progressing     Problem: Communication Impairment (Stroke, Ischemic/Transient Ischemic Attack)  Goal: Improved Communication Skills  9/30/2023 1127 by Janice Mora RN  Outcome: Met  9/30/2023 1126 by Janice Mora RN  Outcome: Ongoing, Progressing     Problem: Functional Ability Impaired (Stroke, Ischemic/Transient Ischemic Attack)  Goal: Optimal Functional Ability  9/30/2023 1127 by Janice Mora RN  Outcome: Met  9/30/2023 1126 by Janice Mora RN  Outcome: Ongoing, Progressing  Intervention: Optimize Functional Ability  Recent Flowsheet Documentation  Taken 9/30/2023 0800 by Janice Mora RN  Activity Management: activity encouraged     Problem: Respiratory Compromise (Stroke, Ischemic/Transient Ischemic Attack)  Goal: Effective Oxygenation and Ventilation  9/30/2023 1127 by Janice Mora RN  Outcome: Met  9/30/2023 1126 by Janice Mora RN  Outcome: Ongoing, Progressing  Intervention: Optimize Oxygenation and Ventilation  Recent Flowsheet Documentation  Taken 9/30/2023 0800 by Janice Mora RN  Head of Bed (HOB) Positioning: HOB at 20 degrees     Problem: Sensorimotor Impairment (Stroke, Ischemic/Transient Ischemic Attack)  Goal: Improved Sensorimotor Function  9/30/2023 1127 by Janice Mora RN  Outcome: Met  9/30/2023 1126 by Janice Mora RN  Outcome: Ongoing, Progressing  Intervention: Optimize Range of Motion, Motor Control and Function  Recent Flowsheet Documentation  Taken 9/30/2023 0800 by Janice Mora RN  Positioning/Transfer Devices:   pillows   in use     Problem: Swallowing Impairment (Stroke,  Ischemic/Transient Ischemic Attack)  Goal: Optimal Eating and Swallowing without Aspiration  9/30/2023 1127 by Janice Mora RN  Outcome: Met  9/30/2023 1126 by Janice Mora RN  Outcome: Ongoing, Progressing     Problem: Urinary Elimination Impaired (Stroke, Ischemic/Transient Ischemic Attack)  Goal: Effective Urinary Elimination  9/30/2023 1127 by Janice Mora RN  Outcome: Met  9/30/2023 1126 by Janice Mora RN  Outcome: Ongoing, Progressing     Problem: Adjustment to Illness (Heart Failure)  Goal: Optimal Coping  9/30/2023 1127 by Janice Mora RN  Outcome: Met  9/30/2023 1126 by Janice Mora RN  Outcome: Ongoing, Progressing  Intervention: Support Psychosocial Response  Recent Flowsheet Documentation  Taken 9/30/2023 0800 by Janice Mora RN  Family/Support System Care:   self-care encouraged   support provided     Problem: Cardiac Output Decreased (Heart Failure)  Goal: Optimal Cardiac Output  9/30/2023 1127 by Janice Mora RN  Outcome: Met  9/30/2023 1126 by Janice Mora RN  Outcome: Ongoing, Progressing     Problem: Dysrhythmia (Heart Failure)  Goal: Stable Heart Rate and Rhythm  9/30/2023 1127 by Janice Mora RN  Outcome: Met  9/30/2023 1126 by Janice Mora RN  Outcome: Ongoing, Progressing     Problem: Fluid Imbalance (Heart Failure)  Goal: Fluid Balance  9/30/2023 1127 by Janice Mora RN  Outcome: Met  9/30/2023 1126 by Janice Mora RN  Outcome: Ongoing, Progressing     Problem: Functional Ability Impaired (Heart Failure)  Goal: Optimal Functional Ability  9/30/2023 1127 by Janice Mora RN  Outcome: Met  9/30/2023 1126 by Janice Mora RN  Outcome: Ongoing, Progressing  Intervention: Optimize Functional Ability  Recent Flowsheet Documentation  Taken 9/30/2023 0800 by Janice Mora RN  Activity Management: activity encouraged     Problem: Oral Intake Inadequate (Heart Failure)  Goal:  Optimal Nutrition Intake  9/30/2023 1127 by Janice Mora RN  Outcome: Met  9/30/2023 1126 by Janice Mora RN  Outcome: Ongoing, Progressing     Problem: Respiratory Compromise (Heart Failure)  Goal: Effective Oxygenation and Ventilation  9/30/2023 1127 by Janice Mora RN  Outcome: Met  9/30/2023 1126 by Janice Mora RN  Outcome: Ongoing, Progressing     Problem: Sleep Disordered Breathing (Heart Failure)  Goal: Effective Breathing Pattern During Sleep  9/30/2023 1127 by Janice Mora RN  Outcome: Met  9/30/2023 1126 by Janice Mora RN  Outcome: Ongoing, Progressing

## 2023-09-30 NOTE — PROGRESS NOTES
"DOS: 2023  NAME: Matt Bueno   : 1970  PCP: Stas Rust MD  Chief Complaint   Patient presents with    Numbness       Chief complaint: Feels better today and only has some numbness in the tips of the fingers of the right index and thumb.  Subjective: He is able to  his right arm better than before and also he is able to lift up his right leg better than before.  He was able to sit up by the side of the bed independently and get up and walk independently by the side of the bed.    Objective:  Vital signs: /54 (BP Location: Right arm, Patient Position: Lying)   Pulse 66   Temp 97.7 °F (36.5 °C) (Oral)   Resp 18   Ht 165 cm (64.96\")   Wt (!) 175 kg (385 lb 5.8 oz)   SpO2 100%   BMI 64.21 kg/m²    Gen: NAD, vitals reviewed  MS: Normal.  CN: Cranials 2-12: No focal deficits noted.  Motor: Muscles of both upper and lower extremities show good bulk power and tone.  The right upper extremity has remarkably improved since yesterday.  Sensory: Has tingling and numbness in the right index and the thumb.  Coordination: Normal finger-to-nose coordination noted and normal heel-to-shin testing noted.  Gait: He is able to get up and ambulate independently and his Romberg is negative.    ROS:  General: Pleasant gentleman currently remarkably improved and able to get up and walk independently.  Neurological: The NIH stroke scale is 1.    Laboratory results:  Lab Results   Component Value Date    GLUCOSE 130 (H) 2023    CALCIUM 8.7 2023     2023    K 4.6 2023    CO2 25.0 2023     2023    BUN 13 2023    CREATININE 0.60 (L) 2023    EGFRIFAFRI 134 10/04/2018    EGFRIFNONA 117 2019    BCR 21.7 2023    ANIONGAP 11.0 2023     Lab Results   Component Value Date    WBC 7.59 2023    HGB 10.8 (L) 2023    HCT 35.0 (L) 2023    MCV 85.4 2023     2023     Lab Results   Component Value Date "    LDL 99 09/30/2023     11/16/2018    LDL 99 10/04/2018         Lab 09/30/23  0633   HEMOGLOBIN A1C 5.50        Review of labs: Globin A1c is 5.5 and the GFR is 115.  The LDL is 99.     CMP:        Lab 09/29/23  1319   SODIUM 140   POTASSIUM 4.6   CHLORIDE 104   CO2 25.0   ANION GAP 11.0   BUN 13   CREATININE 0.60*   EGFR 114.7   GLUCOSE 130*   CALCIUM 8.7   TOTAL PROTEIN 6.8   ALBUMIN 4.2   GLOBULIN 2.6   ALT (SGPT) 10   AST (SGOT) 10   BILIRUBIN 0.4   ALK PHOS 53        TSH          9/30/2023    06:33   TSH   TSH 1.690         Lipid Panel          9/30/2023    06:33   Lipid Panel   Total Cholesterol 166    Triglycerides 82    HDL Cholesterol 52    VLDL Cholesterol 15    LDL Cholesterol  99    LDL/HDL Ratio 1.88           Lab Results   Component Value Date    HGBA1C 5.50 09/30/2023           Review and interpretation of imaging: CT angiogram of the head and neck with contrast showed the following:    CTA:     Aortic arch:  Arch shows no significant narrowing. Great vessel origins  are widely patent. Technique is suboptimal secondary to patient's body  habitus. Patient was scanned 3 times for the CTA perfusion due to short  off of the unit. Lungs are clear except for azygos lobe, normal variant.  Streak artifact from pacemaker identified on the left.     Right carotid:  No significant stenosis is seen of the cervical common  or internal carotid artery. Right carotid artery is very tortuous.     Left carotid: There is mild calcified plaque at the left carotid bulb  causing 50 to 60% stenosis. Left internal and common carotid arteries  are very tortuous.     Vertebrals: The vertebrals are patent. No significant stenosis is  present. System is right vertebral dominant.     The cranial circulation is unremarkable. There is no significant  stenosis, aneurysm, or occlusion.     IMPRESSION:  50 to 60% stenosis left ICA.    FINDINGS:  The lung apices demonstrate no abnormalities. The thyroid gland is  unremarkable.  The visualized cervical spine demonstrates no  abnormalities. The visualized paranasal sinuses are clear.      Aortic arch: The aortic arch has a normal contour.     Right carotid:  No significant stenosis is seen of the cervical common  or internal carotid artery. Right carotid artery is tortuous.     Left carotid: There is mild calcified plaque causing 50 to 60% stenosis.  Left carotid artery is tortuous.     Vertebrals: Right vertebral artery is dominant. No significant stenosis  is present.     IMPRESSION:  50 to 60% stenosis left internal carotid artery.       CT Head Without Contrast Stroke Protocol    Result Date: 9/29/2023  PROCEDURE: CT HEAD WO CONTRAST STROKE PROTOCOL-  HISTORY: Stroke, follow up  COMPARISON: None.  TECHNIQUE: Multiple axial CT images were performed from the foramen magnum to the vertex. Individualized dose reduction techniques using automated exposure control or adjustment of mA and/or kV according to the patient size were employed.  FINDINGS: The patient was scanned twice secondary to significant motion artifact on the first scan. The brain parenchyma is unremarkable.  The ventricles are proper size. There is no evidence of hemorrhage. No masses are identified. No abnormal extra-axial fluid is seen. The paranasal sinuses and mastoid air cells are unremarkable.      Impression: No acute intracranial process.        This report was signed and finalized on 9/29/2023 1:08 PM by Stacy Fernandez MD.           Workup to date: The CT cerebral perfusion showed the following:    FINDINGS:     TOTAL HYPOPERFUSION: Using the threshold of Tmax greater than six  seconds, there is an area of hypoperfusion in the left MCA territory  with a total volume of hyperperfusion of 6 mL.     CORE INFARCT: Using the threshold of CBF less than 30%, there is no area  of ischemic core in the left MCA territory with a total volume of  ischemic core of 0  mL.     PENUMBRA: The penumbra volume (mismatch volume) is 6 mL.  The mismatch  ratio is infinite.     IMPRESSION:  6 mL area of hypoperfusion in the distribution of the left  middle cerebral artery..    Results for orders placed during the hospital encounter of 09/29/23    Adult Transthoracic Echo Complete W/ Cont if Necessary Per Protocol (With Agitated Saline)    Interpretation Summary    Left ventricular ejection fraction appears to be 36 - 40%.    The left ventricular cavity is moderately dilated.    Left ventricular diastolic function is consistent with (grade II w/high LAP) pseudonormalization.    The left atrial cavity is moderate to severely dilated.    Left atrial volume is mildly increased.    Saline test results are negative.    Estimated right ventricular systolic pressure from tricuspid regurgitation is normal (<35 mmHg).         Diagnoses: Patient with acute cerebrovascular event involving the left MCA territory.      Comment: He has dilated cardiomyopathy and a left atrial cavity is moderate to severely dilated which puts him at a high risk of cryptogenic stroke.    Plan:  1.  Patient will be on Lovenox 1 mg/kg body weight every 12 hours for bridging until his INR is therapeutic between 2 and 3.  2.  I have requested our clinical pharmacist to start him on warfarin 5 mg to 7.5 mg daily until the INR is therapeutic.  3.  I have also discontinued the dual antiplatelet therapy for that reason.  4.  Noemi with him that he needs to get in touch with his cardiologist as he will be a good candidate for Watchman device evaluation to prevent cryptogenic stroke and so that he can get off the warfarin as well.  5.  To get his CPAP device readjusted and also the sleep study needs to be redone as he has gained weight in the meantime and has not used his CPAP device for 6 months.    Discussed with the patient as well as ALFREDA Doan and if medically stable he can be discharged home.    Spent a total of 30 minutes in face-to-face evaluation and management of the patient  using the dedicated telemedicine device without any interruption with the help of the rounding nurse with the patient located at the [facility] and myself at a remote location.    Electronically signed by Stacy Dangelo MD, 09/30/23, 11:36 AM EDT.

## 2023-10-01 PROCEDURE — 93295 DEV INTERROG REMOTE 1/2/MLT: CPT | Performed by: INTERNAL MEDICINE

## 2023-10-01 PROCEDURE — 93296 REM INTERROG EVL PM/IDS: CPT | Performed by: INTERNAL MEDICINE

## 2023-10-02 ENCOUNTER — TRANSITIONAL CARE MANAGEMENT TELEPHONE ENCOUNTER (OUTPATIENT)
Dept: CALL CENTER | Facility: HOSPITAL | Age: 53
End: 2023-10-02
Payer: MEDICARE

## 2023-10-02 ENCOUNTER — TELEPHONE (OUTPATIENT)
Dept: INTERNAL MEDICINE | Facility: HOSPITAL | Age: 53
End: 2023-10-02
Payer: MEDICARE

## 2023-10-02 ENCOUNTER — LAB (OUTPATIENT)
Dept: LAB | Facility: HOSPITAL | Age: 53
End: 2023-10-02
Payer: MEDICARE

## 2023-10-02 DIAGNOSIS — I63.9 ACUTE CVA (CEREBROVASCULAR ACCIDENT): ICD-10-CM

## 2023-10-02 DIAGNOSIS — I42.0 DILATED CARDIOMYOPATHY: ICD-10-CM

## 2023-10-02 LAB
INR PPP: 0.96 (ref 0.9–1.1)
PROTHROMBIN TIME: 13.3 SECONDS (ref 12.3–15.1)

## 2023-10-02 PROCEDURE — 36415 COLL VENOUS BLD VENIPUNCTURE: CPT

## 2023-10-02 PROCEDURE — 85610 PROTHROMBIN TIME: CPT

## 2023-10-02 NOTE — TELEPHONE ENCOUNTER
Attempted to call patient regarding coumadin PT/INR. Will need to increase dose as he is subtherapeutic. No response.

## 2023-10-02 NOTE — OUTREACH NOTE
Call Center TCM Note      Flowsheet Row Responses   [facility] patient discharged from? [facility]   Does the patient have one of the following disease processes/diagnoses(primary or secondary)? Stroke   TCM attempt successful? No   Unsuccessful attempts Attempt 1            Fawn Vigil RN    10/2/2023, 10:29 EDT

## 2023-10-02 NOTE — OUTREACH NOTE
Call Center TCM Note      Flowsheet Row Responses   [facility] patient discharged from? [facility]   Does the patient have one of the following disease processes/diagnoses(primary or secondary)? Stroke   TCM attempt successful? No   Unsuccessful attempts Attempt 2            Fawn Vigil RN    10/2/2023, 14:01 EDT

## 2023-10-03 ENCOUNTER — TRANSITIONAL CARE MANAGEMENT TELEPHONE ENCOUNTER (OUTPATIENT)
Dept: CALL CENTER | Facility: HOSPITAL | Age: 53
End: 2023-10-03
Payer: MEDICARE

## 2023-10-03 ENCOUNTER — OFFICE VISIT (OUTPATIENT)
Dept: FAMILY MEDICINE CLINIC | Facility: CLINIC | Age: 53
End: 2023-10-03
Payer: MEDICARE

## 2023-10-03 VITALS
HEART RATE: 85 BPM | TEMPERATURE: 98.4 F | RESPIRATION RATE: 18 BRPM | OXYGEN SATURATION: 100 % | DIASTOLIC BLOOD PRESSURE: 70 MMHG | WEIGHT: 315 LBS | HEIGHT: 65 IN | SYSTOLIC BLOOD PRESSURE: 118 MMHG | BODY MASS INDEX: 52.48 KG/M2

## 2023-10-03 DIAGNOSIS — Z86.73 HISTORY OF CEREBROVASCULAR ACCIDENT (CVA) IN ADULTHOOD: ICD-10-CM

## 2023-10-03 DIAGNOSIS — Z09 HOSPITAL DISCHARGE FOLLOW-UP: Primary | ICD-10-CM

## 2023-10-03 DIAGNOSIS — R20.0 RIGHT FACIAL NUMBNESS: ICD-10-CM

## 2023-10-03 DIAGNOSIS — D64.9 ANEMIA, UNSPECIFIED TYPE: ICD-10-CM

## 2023-10-03 DIAGNOSIS — I63.9 ISCHEMIC STROKE: ICD-10-CM

## 2023-10-03 DIAGNOSIS — R20.0 NUMBNESS OF RIGHT HAND: ICD-10-CM

## 2023-10-03 DIAGNOSIS — Z79.01 WARFARIN ANTICOAGULATION: ICD-10-CM

## 2023-10-03 DIAGNOSIS — G47.33 OSA ON CPAP: ICD-10-CM

## 2023-10-03 RX ORDER — WARFARIN SODIUM 10 MG/1
TABLET ORAL
Qty: 30 TABLET | Refills: 0 | Status: SHIPPED | OUTPATIENT
Start: 2023-10-03

## 2023-10-03 NOTE — OUTREACH NOTE
Call Center TCM Note      Flowsheet Row Responses   [facility] patient discharged from? [facility]   Does the patient have one of the following disease processes/diagnoses(primary or secondary)? Stroke   TCM attempt successful? Yes   Discharge diagnosis Ischemic stroke   Does the patient have an appointment with their PCP within 7-14 days of discharge? Yes  [10/3/23 at 12:30 PM]   TCM call completed? Yes   Wrap up additional comments Pt had PCP [facility] hospital fu appt today, 10/3/23 at 12:30 PM   Would this patient benefit from a Referral to [facility] Social Work? No   Is the patient interested in additional calls from an ambulatory ? No            Debbie Brock RN    10/3/2023, 13:02 EDT

## 2023-10-03 NOTE — PROGRESS NOTES
Transitional Care Follow Up Visit  Subjective     Matt Bueno is a 52 y.o. male who presents for a transitional care management visit.    Within 48 business hours after discharge our office contacted him via telephone to coordinate his care and needs.      I reviewed and discussed the details of that call along with the discharge summary, hospital problems, inpatient lab results, inpatient diagnostic studies, and consultation reports with Matt.     Current outpatient and discharge medications have been reconciled for the patient.  Reviewed by: Stas Rust MD          9/30/2023     1:45 PM   Date of TCM Phone Call   [facility]   Date of Admission 9/29/2023   Date of Discharge 9/30/2023   Discharge Disposition Home or Self Care     Risk for Readmission (LACE) Score: 4 (9/30/2023  6:00 AM)      History of Present Illness   Course During Hospital Stay:      Acute onset of R sided UE, LE and facial numbness and weakness  He had no use at all when this started  Overall his strength is better but his feeling is still poor  Has not done any PT yet as he live in [place]    May need a watchman procedure    His INR was low recently  He is on 5 mg once at night right now      He is still using his CPAP machine every night  Does need new supplies for the machine  He had stopped using it but needs to use it again     The following portions of the patient's history were reviewed and updated as appropriate: allergies, current medications, past family history, past medical history, past social history, past surgical history, and problem list.    Review of Systems   Constitutional: Negative.    Respiratory: Negative.     Neurological:  Positive for numbness. Negative for weakness.     Objective   Physical Exam  Vitals and nursing note reviewed.   Constitutional:       General: He is not in acute distress.     Appearance: Normal appearance. He is well-developed. He is obese.   Cardiovascular:      Rate and Rhythm:  Normal rate and regular rhythm.      Heart sounds: Normal heart sounds.   Pulmonary:      Effort: Pulmonary effort is normal.      Breath sounds: Normal breath sounds.   Neurological:      Mental Status: He is alert and oriented to person, place, and time.   Psychiatric:         Mood and Affect: Mood normal.         Behavior: Behavior normal.         Thought Content: Thought content normal.         Judgment: Judgment normal.       Assessment & Plan   Diagnoses and all orders for this visit:    1. Hospital discharge follow-up (Primary)    2. History of cerebrovascular accident (CVA) in adulthood  -     Cancel: POCT INR  -     Ambulatory Referral to Physical Therapy Evaluate and treat    3. Warfarin anticoagulation  -     Cancel: POCT INR  -     warfarin (Coumadin) 10 MG tablet; Take 10 mg every evening  Dispense: 30 tablet; Refill: 0    4. Right facial numbness  -     Ambulatory Referral to Physical Therapy Evaluate and treat    5. Numbness of right hand  -     Ambulatory Referral to Physical Therapy Evaluate and treat    6. Ischemic stroke  -     warfarin (Coumadin) 10 MG tablet; Take 10 mg every evening  Dispense: 30 tablet; Refill: 0    7. POOJA on CPAP    8. Anemia, unspecified type  -     CBC & Differential  -     Iron Profile  -     Vitamin B12 & Folate      CVA in left middle cerebral artery distribution with improvement in strength with only residual sensation loss still.  I am going to work on PT ASAP and possible TO for full return of function.    INR subtherapeutic at 0.96 yesterday.,  continue lovenox and increase warfarin form 5 to 10    Recheck blood counts, iron and B12 levels.

## 2023-10-04 ENCOUNTER — HOME HEALTH ADMISSION (OUTPATIENT)
Dept: HOME HEALTH SERVICES | Facility: HOME HEALTHCARE | Age: 53
End: 2023-10-04
Payer: MEDICARE

## 2023-10-04 LAB
BASOPHILS # BLD AUTO: 0.1 X10E3/UL (ref 0–0.2)
BASOPHILS NFR BLD AUTO: 1 %
EOSINOPHIL # BLD AUTO: 0.3 X10E3/UL (ref 0–0.4)
EOSINOPHIL NFR BLD AUTO: 5 %
ERYTHROCYTE [DISTWIDTH] IN BLOOD BY AUTOMATED COUNT: 13.7 % (ref 11.6–15.4)
FOLATE SERPL-MCNC: 4.5 NG/ML
HCT VFR BLD AUTO: 36.5 % (ref 37.5–51)
HGB BLD-MCNC: 11.6 G/DL (ref 13–17.7)
IMM GRANULOCYTES # BLD AUTO: 0 X10E3/UL (ref 0–0.1)
IMM GRANULOCYTES NFR BLD AUTO: 0 %
IRON SATN MFR SERPL: 17 % (ref 15–55)
IRON SERPL-MCNC: 51 UG/DL (ref 38–169)
LYMPHOCYTES # BLD AUTO: 1.9 X10E3/UL (ref 0.7–3.1)
LYMPHOCYTES NFR BLD AUTO: 25 %
MCH RBC QN AUTO: 26.8 PG (ref 26.6–33)
MCHC RBC AUTO-ENTMCNC: 31.8 G/DL (ref 31.5–35.7)
MCV RBC AUTO: 84 FL (ref 79–97)
MONOCYTES # BLD AUTO: 0.7 X10E3/UL (ref 0.1–0.9)
MONOCYTES NFR BLD AUTO: 9 %
NEUTROPHILS # BLD AUTO: 4.6 X10E3/UL (ref 1.4–7)
NEUTROPHILS NFR BLD AUTO: 60 %
PLATELET # BLD AUTO: 261 X10E3/UL (ref 150–450)
RBC # BLD AUTO: 4.33 X10E6/UL (ref 4.14–5.8)
TIBC SERPL-MCNC: 295 UG/DL (ref 250–450)
UIBC SERPL-MCNC: 244 UG/DL (ref 111–343)
VIT B12 SERPL-MCNC: 272 PG/ML (ref 232–1245)
WBC # BLD AUTO: 7.6 X10E3/UL (ref 3.4–10.8)

## 2023-10-23 ENCOUNTER — OFFICE VISIT (OUTPATIENT)
Dept: FAMILY MEDICINE CLINIC | Facility: CLINIC | Age: 53
End: 2023-10-23
Payer: MEDICARE

## 2023-10-23 VITALS
DIASTOLIC BLOOD PRESSURE: 70 MMHG | OXYGEN SATURATION: 90 % | WEIGHT: 315 LBS | HEIGHT: 65 IN | TEMPERATURE: 97.8 F | BODY MASS INDEX: 52.48 KG/M2 | RESPIRATION RATE: 18 BRPM | SYSTOLIC BLOOD PRESSURE: 104 MMHG | HEART RATE: 93 BPM

## 2023-10-23 DIAGNOSIS — R31.0 GROSS HEMATURIA: Primary | ICD-10-CM

## 2023-10-23 DIAGNOSIS — I63.9 ACUTE CVA (CEREBROVASCULAR ACCIDENT): ICD-10-CM

## 2023-10-23 LAB
BILIRUB BLD-MCNC: NEGATIVE MG/DL
CLARITY, POC: CLEAR
COLOR UR: ABNORMAL
EXPIRATION DATE: ABNORMAL
GLUCOSE UR STRIP-MCNC: NEGATIVE MG/DL
KETONES UR QL: ABNORMAL
LEUKOCYTE EST, POC: NEGATIVE
Lab: ABNORMAL
NITRITE UR-MCNC: NEGATIVE MG/ML
PH UR: 5.5 [PH] (ref 5–8)
PROT UR STRIP-MCNC: ABNORMAL MG/DL
RBC # UR STRIP: ABNORMAL /UL
SP GR UR: 1.01 (ref 1–1.03)
UROBILINOGEN UR QL: NORMAL

## 2023-10-23 NOTE — PROGRESS NOTES
Subjective   Matt Bueno is a 52 y.o. male.     History of Present Illness     He has noted some blood in his urine the past 5 days  Stopped warfarin due to this  Blood has waxed and waned the past 5 days      Had some LUQ and lest flank pain this AM  This was short lived and has resolved at this time  Eased after about 3 hours and no pain at this time      Review of Systems   Constitutional: Negative.    Genitourinary:  Positive for hematuria.       Objective   Physical Exam  Vitals and nursing note reviewed.   Constitutional:       General: He is not in acute distress.     Appearance: Normal appearance. He is well-developed.   Cardiovascular:      Rate and Rhythm: Normal rate and regular rhythm.      Heart sounds: Normal heart sounds.   Pulmonary:      Effort: Pulmonary effort is normal.      Breath sounds: Normal breath sounds.   Neurological:      Mental Status: He is alert and oriented to person, place, and time.   Psychiatric:         Mood and Affect: Mood normal.         Behavior: Behavior normal.         Thought Content: Thought content normal.         Judgment: Judgment normal.         Assessment & Plan   Diagnoses and all orders for this visit:    1. Gross hematuria (Primary)  -     POCT urinalysis dipstick, automated  -     Urine Culture - Urine, Urine, Clean Catch  -     Urinalysis With Microscopic - Urine, Clean Catch    2. Acute CVA (cerebrovascular accident)    Resume warfarin due to recent CVA!!!  Check urine micro and culture and f/u pending tests.  Pt agrees  May need urology if this continues no matter what but he does need to be on warfarin with recent CVA!

## 2023-10-25 DIAGNOSIS — R31.0 GROSS HEMATURIA: Primary | ICD-10-CM

## 2023-10-25 DIAGNOSIS — Z79.01 WARFARIN ANTICOAGULATION: ICD-10-CM

## 2023-10-25 LAB
APPEARANCE UR: ABNORMAL
BACTERIA #/AREA URNS HPF: ABNORMAL /[HPF]
BACTERIA UR CULT: NO GROWTH
BACTERIA UR CULT: NORMAL
BILIRUB UR QL STRIP: NEGATIVE
CASTS URNS QL MICRO: ABNORMAL /LPF
COLOR UR: YELLOW
EPI CELLS #/AREA URNS HPF: ABNORMAL /HPF (ref 0–10)
GLUCOSE UR QL STRIP: NEGATIVE
HGB UR QL STRIP: ABNORMAL
KETONES UR QL STRIP: ABNORMAL
LEUKOCYTE ESTERASE UR QL STRIP: NEGATIVE
MICRO URNS: ABNORMAL
NITRITE UR QL STRIP: NEGATIVE
PH UR STRIP: 5.5 [PH] (ref 5–7.5)
PROT UR QL STRIP: ABNORMAL
RBC #/AREA URNS HPF: ABNORMAL /HPF (ref 0–2)
SP GR UR STRIP: 1.01 (ref 1–1.03)
UROBILINOGEN UR STRIP-MCNC: 0.2 MG/DL (ref 0.2–1)
WBC #/AREA URNS HPF: ABNORMAL /HPF (ref 0–5)

## 2023-11-02 ENCOUNTER — TELEPHONE (OUTPATIENT)
Dept: UROLOGY | Facility: CLINIC | Age: 53
End: 2023-11-02

## 2023-11-02 NOTE — TELEPHONE ENCOUNTER
Caller: DAIN KAUR    Relationship: SELF     Best call back number: [phone]    What is the best time to reach you: ANYTIME    Who are you requesting to speak with (clinical staff, provider,  specific staff member): ABIODUN    Do you know the name of the person who called: ABIODUN    What was the call regarding: REFERRAL

## 2023-11-27 DIAGNOSIS — Z79.01 WARFARIN ANTICOAGULATION: ICD-10-CM

## 2023-11-27 DIAGNOSIS — I63.9 ISCHEMIC STROKE: ICD-10-CM

## 2023-11-28 RX ORDER — WARFARIN SODIUM 10 MG/1
TABLET ORAL
Qty: 30 TABLET | Refills: 2 | Status: SHIPPED | OUTPATIENT
Start: 2023-11-28

## 2023-12-12 ENCOUNTER — OFFICE VISIT (OUTPATIENT)
Dept: UROLOGY | Facility: CLINIC | Age: 53
End: 2023-12-12
Payer: MEDICARE

## 2023-12-12 VITALS
WEIGHT: 315 LBS | TEMPERATURE: 97.3 F | HEART RATE: 81 BPM | HEIGHT: 65 IN | RESPIRATION RATE: 18 BRPM | OXYGEN SATURATION: 99 % | BODY MASS INDEX: 52.48 KG/M2

## 2023-12-12 DIAGNOSIS — R31.0 GROSS HEMATURIA: Primary | ICD-10-CM

## 2023-12-12 LAB
BILIRUB BLD-MCNC: NEGATIVE MG/DL
CLARITY, POC: ABNORMAL
COLOR UR: ABNORMAL
EXPIRATION DATE: ABNORMAL
GLUCOSE UR STRIP-MCNC: NEGATIVE MG/DL
KETONES UR QL: NEGATIVE
LEUKOCYTE EST, POC: NEGATIVE
Lab: ABNORMAL
NITRITE UR-MCNC: NEGATIVE MG/ML
PH UR: 6 [PH] (ref 5–8)
PROT UR STRIP-MCNC: ABNORMAL MG/DL
RBC # UR STRIP: NEGATIVE /UL
SP GR UR: 1.02 (ref 1–1.03)
UROBILINOGEN UR QL: NORMAL

## 2023-12-12 PROCEDURE — 1159F MED LIST DOCD IN RCRD: CPT | Performed by: PHYSICIAN ASSISTANT

## 2023-12-12 PROCEDURE — 81003 URINALYSIS AUTO W/O SCOPE: CPT | Performed by: PHYSICIAN ASSISTANT

## 2023-12-12 PROCEDURE — 1160F RVW MEDS BY RX/DR IN RCRD: CPT | Performed by: PHYSICIAN ASSISTANT

## 2023-12-12 PROCEDURE — 99204 OFFICE O/P NEW MOD 45 MIN: CPT | Performed by: PHYSICIAN ASSISTANT

## 2023-12-12 NOTE — PROGRESS NOTES
Chief Complaint  Gross hematuria    HPI  Mr. Bueno is a 53 y.o. male with history of CVA who presents with gross hematuria.    States it began a couple of days after his hospital admission for CVA, end of September. Started while he was bridging Warfarin and Lovenox. Hematuria was painless, described as dark urine, no large clots. He's now on warfarin only, but still experiencing tea-colored urine every few days.      History of smoking?    no  History of second-hand smoking exposure? yes, childhood  History of chemotherapy?   no  History of radiation?    no  History of kidney or bladder stones?  no  History of frequent urinary tract infections? no    Occupational History:  with food [employer]       IPSS Questionnaire (AUA-7):  Over the past month…    1)  Incomplete Emptying  How often have you had a sensation of not emptying your bladder?  0 - Not at all   2)  Frequency  How often have you had to urinate less than every two hours? 0 - Not at all   3)  Intermittency  How often have you found you stopped and started again several times when you urinated?  0 - Not at all   4) Urgency  How often have you found it difficult to postpone urination?  0 - Not at all   5) Weak Stream  How often have you had a weak urinary stream?  0 - Not at all   6) Straining  How often have you had to push or strain to begin urination?  0 - Not at all   7) Nocturia  How many times did you typically get up at night to urinate?  1 - 1 time   Total Score:  1       Quality of life due to urinary symptoms:  If you were to spend the rest of your life with your urinary condition the way it is now, how would you feel about that? 0-Delighted   Urine Leakage (Incontinence) 0-No Leakage         Past Medical History  Past Medical History:   Diagnosis Date    Abnormal ECG 11/04/2018    ?    Acute CVA (cerebrovascular accident) 9/29/2023    Back pain     WHOLE BACK - LEFT LEG PAIN     Bilateral arm pain     FROM NECK ISSUES     CHF  (congestive heart failure)     Congenital heart disease 12/10/2018    ?    Enlarged heart     GERD (gastroesophageal reflux disease)     Hypertension     Left leg pain     FROM BACK ISSUES    Neck pain     BILAT ARM PAIN     POOJA on CPAP     compliant with machine     Sciatic nerve pain     Sleep apnea     SOBOE (shortness of breath on exertion)     Wears glasses     readers       Past Surgical History  Past Surgical History:   Procedure Laterality Date    CARDIAC CATHETERIZATION N/A 11/16/2018    Procedure: Left Heart Cath;  Surgeon: Matt Hernandez MD;  Location:  [facility];  Service: Cardiovascular    CARDIAC DEFIBRILLATOR PLACEMENT  04/01/2019    CARDIAC ELECTROPHYSIOLOGY PROCEDURE N/A 04/01/2019    Procedure: Device Implant- VDI ICD Biotronik;  Surgeon: Romeo Day MD;  Location:  [facility];  Service: Cardiology    ENDOSCOPY      GASTRIC SLEEVE LAPAROSCOPIC  2013    KNEE ARTHROSCOPY Left     SHOULDER ARTHROSCOPY Bilateral     both shoulder in the past    WISDOM TOOTH EXTRACTION      aLL 4 REMOVED        Medications    Current Outpatient Medications:     atorvastatin (LIPITOR) 80 MG tablet, Take 1 tablet by mouth Every Night., Disp: 90 tablet, Rfl: 0    carvedilol (COREG) 12.5 MG tablet, Take 1 tablet by mouth 2 (Two) Times a Day With Meals., Disp: 180 tablet, Rfl: 3    Enoxaparin Sodium (LOVENOX) 120 MG/0.8ML solution prefilled syringe syringe, Inject 0.8 mL under the skin into the appropriate area as directed Every 12 (Twelve) Hours. Indications: Other - full anticoagulation, Disp: 8 mL, Rfl: 0    furosemide (LASIX) 40 MG tablet, Take 1 tablet by mouth Daily., Disp: 30 tablet, Rfl: 11    HYDROcodone-acetaminophen (NORCO)  MG per tablet, Take 1 tablet by mouth Every 6 (Six) Hours., Disp: , Rfl:     omeprazole (priLOSEC) 20 MG capsule, Take 1 capsule by mouth Daily., Disp: , Rfl:     sacubitril-valsartan (ENTRESTO)  MG tablet, Take 1 tablet by mouth 2 (Two)  "Times a Day. Hold if SBP < 100, Disp: 60 tablet, Rfl: 11    traMADol (ULTRAM) 50 MG tablet, Take 1 tablet by mouth Every 12 (Twelve) Hours., Disp: , Rfl:     traMADol-acetaminophen (ULTRACET) 37.5-325 MG per tablet, Take 2 tablets every 4 hours by oral route., Disp: , Rfl:     warfarin (Coumadin) 10 MG tablet, Take 10 mg every evening, Disp: 30 tablet, Rfl: 2    Allergies  No Known Allergies    Social History  Social History     Socioeconomic History    Marital status:    Tobacco Use    Smoking status: Never     Passive exposure: Past    Smokeless tobacco: Never   Vaping Use    Vaping Use: Never used   Substance and Sexual Activity    Alcohol use: Yes     Comment: rarely    Drug use: No    Sexual activity: Yes     Partners: Female     Birth control/protection: None     Comment:        Family History  He has no family history of prostate, bladder or kidney cancer  He has no family history of kidney stones      Physical Exam  Visit Vitals  Pulse 81   Temp 97.3 °F (36.3 °C)   Resp 18   Ht 165 cm (64.96\")   Wt (!) 174 kg (383 lb 9.6 oz)   SpO2 99%   BMI 63.91 kg/m²       Labs  Brief Urine Lab Results  (Last result in the past 365 days)        Color   Clarity   Blood   Leuk Est   Nitrite   Protein   CREAT   Urine HCG        12/12/23 1115 Dark Yellow   Hazy   Negative   Negative   Negative   3+                         Component  Ref Range & Units 1 mo ago   WBC, UA  0 - 5 /hpf 0-5   RBC, UA  0 - 2 /hpf 3-10 Abnormal    Epithelial Cells (non renal)  0 - 10 /hpf 0-10   Casts  None seen /lpf None seen   Bacteria, UA  None seen/Few None seen   Resulting Agency [facility]            Assessment  53 y.o. male with hx of CVA on warfarin who presents with gross hematuria. This is a new diagnosis of uncertain prognosis.    Risk factors include presence of GH, male sex, age 52yo.  In order to complete the hematuria work up we will perform flexible cystoscopy and acquire upper tract imaging.    Plan  1.  We discussed the " indications for diagnostic flexible cystoscopy to be performed at the next clinic visit.  2.  CTU prior to follow up  3. IPSS is 1

## 2023-12-18 ENCOUNTER — OFFICE VISIT (OUTPATIENT)
Dept: CARDIOLOGY | Facility: CLINIC | Age: 53
End: 2023-12-18
Payer: MEDICARE

## 2023-12-18 VITALS
HEIGHT: 65 IN | DIASTOLIC BLOOD PRESSURE: 68 MMHG | BODY MASS INDEX: 52.48 KG/M2 | HEART RATE: 92 BPM | WEIGHT: 315 LBS | OXYGEN SATURATION: 95 % | SYSTOLIC BLOOD PRESSURE: 118 MMHG

## 2023-12-18 DIAGNOSIS — E78.2 MIXED HYPERLIPIDEMIA: ICD-10-CM

## 2023-12-18 DIAGNOSIS — I10 ESSENTIAL HYPERTENSION: ICD-10-CM

## 2023-12-18 DIAGNOSIS — I50.22 CHRONIC SYSTOLIC CONGESTIVE HEART FAILURE: Primary | ICD-10-CM

## 2023-12-18 DIAGNOSIS — I42.8 NICM (NONISCHEMIC CARDIOMYOPATHY): ICD-10-CM

## 2023-12-18 NOTE — PROGRESS NOTES
[facility] Cardiology  Subjective:     Encounter Date: 12/18/2023      Patient ID: Matt Bueno is a 53 y.o. male.    Chief Complaint: Cardiomyopathy and Shortness of Breath      PROBLEM LIST:  Nonischemic cardiomyopathy:  Echocardiogram, 10/31/2018: EF 25%. Mild MR. RVSP 57 mmHg.  [facility], 11/16/2018: Normal coronary arteries. Dilated cardiomyopathy with EF 15%. Significantly elevated LVEDP.  Echocardiogram, 02/20/2019: EF 25%. Mild-to-moderate MR.  ICD placement, 04/01/2019, Dr. Day: Dragon Ports VVI ICD, model Dynagen EL ICD VR, serial #[id number].  Echocardiogram, 02/03/20: EF 30%. Mild MR.  Echo, 09/29/2023: EF 36-40%. Left ventricular diastolic function is consistent with (grade II w/high LAP) pseudonormalization. The left atrial cavity is moderate to severely dilated. Left atrial volume is mildly increased. Negative saline test. Normal RVSP.  Carotid duplex, 09/29/2023: Mild plaque within the left carotid bulb causing 50% stenosis or less. No plaque identified in the right carotid bulb, no stenosis seen.  Morbid obesity:  Peak weight 500 lbs.  Current weight 377  CVA  9/23/2023, left MCA territory.  Presumed thromboembolic, placed on warfarin  Paroxysmal atrial fibrillation   Asymptomatic, noted on device check.  sleep apnea on CPAP  Incomplete LBBB  Surgeries:  Gastric sleeve  Left knee arthroscopy  Bilateral shoulder surgery  [place] teeth extracted      History of Present Illness  Matt Bueno returns today for a follow up with a history of NICM, chronic systolic CHF, and cardiac risk factors. Since last visit, patient had a CVA, left hemispheric, presumed thromboembolic in September this year.  He presented with right sided numbness and weakness, which resolved within 24 to 48 hours.  He was started on warfarin,.  Carotid duplex showed nonflow-limiting disease and started on statin as well.  Since being discharged he is returned to his baseline, has minimal  residual defects.  No shortness of breath chest pain orthopnea PND.    No Known Allergies      Current Outpatient Medications:     atorvastatin (LIPITOR) 80 MG tablet, Take 1 tablet by mouth Every Night., Disp: 90 tablet, Rfl: 0    carvedilol (COREG) 12.5 MG tablet, Take 1 tablet by mouth 2 (Two) Times a Day With Meals., Disp: 180 tablet, Rfl: 3    Enoxaparin Sodium (LOVENOX) 120 MG/0.8ML solution prefilled syringe syringe, Inject 0.8 mL under the skin into the appropriate area as directed Every 12 (Twelve) Hours. Indications: Other - full anticoagulation, Disp: 8 mL, Rfl: 0    furosemide (LASIX) 40 MG tablet, Take 1 tablet by mouth Daily., Disp: 30 tablet, Rfl: 11    HYDROcodone-acetaminophen (NORCO)  MG per tablet, Take 1 tablet by mouth Every 6 (Six) Hours., Disp: , Rfl:     omeprazole (priLOSEC) 20 MG capsule, Take 1 capsule by mouth Daily., Disp: , Rfl:     sacubitril-valsartan (ENTRESTO)  MG tablet, Take 1 tablet by mouth 2 (Two) Times a Day. Hold if SBP < 100, Disp: 60 tablet, Rfl: 11    traMADol (ULTRAM) 50 MG tablet, Take 1 tablet by mouth Every 12 (Twelve) Hours., Disp: , Rfl:     traMADol-acetaminophen (ULTRACET) 37.5-325 MG per tablet, Take 2 tablets every 4 hours by oral route., Disp: , Rfl:     warfarin (Coumadin) 10 MG tablet, Take 10 mg every evening, Disp: 30 tablet, Rfl: 2    The following portions of the patient's history were reviewed and updated as appropriate: allergies, current medications, past family history, past medical history, past social history, past surgical history and problem list.    Review of Systems   Constitutional: Negative.   Cardiovascular:  Negative for chest pain, dyspnea on exertion, leg swelling, palpitations and syncope.   Respiratory: Negative.  Negative for shortness of breath.    Hematologic/Lymphatic: Negative for bleeding problem. Does not bruise/bleed easily.   Skin:  Negative for rash.   Musculoskeletal:  Negative for muscle weakness and myalgias.  "  Gastrointestinal:  Negative for heartburn, nausea and vomiting.   Neurological:  Negative for dizziness, light-headedness, loss of balance and numbness.          Objective:     Vitals:    12/18/23 1005   BP: 118/68   BP Location: Left arm   Patient Position: Sitting   Cuff Size: Adult   Pulse: 92   SpO2: 95%   Weight: (!) 179 kg (394 lb 6.4 oz)   Height: 165.1 cm (65\")         Vitals reviewed.   Constitutional:       Appearance: Well-developed and not in distress.   Neck:      Thyroid: No thyromegaly.      Vascular: No carotid bruit or JVD.      Trachea: No tracheal deviation.   Pulmonary:      Effort: Pulmonary effort is normal.      Breath sounds: Normal breath sounds.   Cardiovascular:      Normal rate. Regular rhythm.      No gallop. No S3 and S4 gallop.   Pulses:     Intact distal pulses.      Carotid: 2+ bilaterally.     Radial: 2+ bilaterally.  Edema:     Peripheral edema absent.   Abdominal:      General: Bowel sounds are normal.      Palpations: Abdomen is soft. There is no abdominal mass.      Tenderness: There is no abdominal tenderness.   Musculoskeletal:         General: No deformity.      Extremities: No clubbing present.Skin:     General: Skin is warm and dry.      Findings: No rash.   Neurological:      Mental Status: Alert and oriented to person, place, and time.         Lab Review:  Lab Results   Component Value Date    GLUCOSE 130 (H) 09/29/2023    BUN 13 09/29/2023    CREATININE 0.60 (L) 09/29/2023    BCR 21.7 09/29/2023    K 4.6 09/29/2023    CO2 25.0 09/29/2023    CALCIUM 8.7 09/29/2023    ALBUMIN 4.2 09/29/2023    ALKPHOS 53 09/29/2023    AST 10 09/29/2023    ALT 10 09/29/2023     Lab Results   Component Value Date    CHOL 166 09/30/2023    TRIG 82 09/30/2023    HDL 52 09/30/2023    LDL 99 09/30/2023      Lab Results   Component Value Date    WBC 7.6 10/03/2023    RBC 4.33 10/03/2023    HGB 11.6 (L) 10/03/2023    HCT 36.5 (L) 10/03/2023    MCV 84 10/03/2023     10/03/2023     Lab " "Results   Component Value Date    TSH 1.690 09/30/2023     Lab Results   Component Value Date    HGBA1C 5.50 09/30/2023        Procedures  Device check: [employer] ICD.  VVI at 40.  Less than 1% ventricular paced.  Normal threshold impedances.  73 \"rapid ventricular rate monitored.  These appear to be irregular, and may/most likely represents atrial fibrillation as opposed to VT.    Advance Care Planning   ACP discussion was held with the patient during this visit. Patient has an advance directive in EMR which is still valid.            Assessment:   Diagnoses and all orders for this visit:    1. Chronic systolic congestive heart failure (Primary)    2. NICM (nonischemic cardiomyopathy)    3. Essential hypertension    4. Mixed hyperlipidemia        Impression:  1. Chronic systolic congestive heart failure. Stable and asymptomatic.     2. NICM. Stable and asymptomatic. Continue on Entresto  mg BID for hypertension and cardiomyopathy. Continue on warfarin 10 mg daily for stroke prophylaxis.     3. Essential hypertension. Well controlled. Continue on carvedilol 12.5 mg BID for hypertension. Continue on Lasix 40 mg daily for fluid retention.     4. Mixed hyperlipidemia. Well controlled. Continue on atorvastatin 80 mg daily for hyperlipidemia.     5.  Atrial fibrillation, newly diagnosed.  With RVR intermittent but asymptomatic.  Now on warfarin    6.  Recent CVA thromboembolic.  Peggy was discussed with patient by other physicians, he is not particularly interested at this time.    Plan:  Stable cardiac status.   Given his brief PAF, although it is rapid, and asymptomatic status, will not start antiarrhythmic at this time.  He is already on beta-blocker.  Continue current medications, including lifelong anticoagulation.  Revisit in 6 MO with device check, or sooner as needed.          Matt Hernandez MD      Please note that portions of this note may have been completed with a voice recognition program. " Efforts were made to edit the dictations, but occasionally words are mistranscribed.

## 2024-01-05 ENCOUNTER — OFFICE VISIT (OUTPATIENT)
Dept: FAMILY MEDICINE CLINIC | Facility: CLINIC | Age: 54
End: 2024-01-05
Payer: MEDICARE

## 2024-01-05 VITALS
HEIGHT: 65 IN | OXYGEN SATURATION: 95 % | SYSTOLIC BLOOD PRESSURE: 134 MMHG | BODY MASS INDEX: 52.48 KG/M2 | HEART RATE: 96 BPM | RESPIRATION RATE: 22 BRPM | DIASTOLIC BLOOD PRESSURE: 82 MMHG | WEIGHT: 315 LBS | TEMPERATURE: 98.2 F

## 2024-01-05 DIAGNOSIS — R05.1 ACUTE COUGH: Primary | ICD-10-CM

## 2024-01-05 DIAGNOSIS — U07.1 COVID-19 VIRUS INFECTION: ICD-10-CM

## 2024-01-05 DIAGNOSIS — Z79.01 WARFARIN ANTICOAGULATION: ICD-10-CM

## 2024-01-05 LAB
EXPIRATION DATE: ABNORMAL
FLUAV AG UPPER RESP QL IA.RAPID: NOT DETECTED
FLUBV AG UPPER RESP QL IA.RAPID: NOT DETECTED
INR PPP: 1.7 (ref 0.9–1.1)
INTERNAL CONTROL: ABNORMAL
Lab: ABNORMAL
SARS-COV-2 AG UPPER RESP QL IA.RAPID: DETECTED

## 2024-01-05 RX ORDER — IPRATROPIUM BROMIDE AND ALBUTEROL SULFATE 2.5; .5 MG/3ML; MG/3ML
3 SOLUTION RESPIRATORY (INHALATION) EVERY 4 HOURS PRN
Qty: 120 ML | Refills: 0 | Status: SHIPPED | OUTPATIENT
Start: 2024-01-05

## 2024-01-05 NOTE — PROGRESS NOTES
Chief Complaint  Shortness of Breath (SOA, sinus congestion, gurgling a lot at night due to drainage, cough, fatigue and dizziness, chills/feeling hot but no fever that he's noticed, started 4-5 days ago.)    Subjective          Matt Bueno presents to [facility]  History of Present Illness  Symptoms started 5 days ago, worsened over the last 2 days.   Cough, head congestion, headache, PND, dizziness.   Gets short of breath if he walks  Treating with Mucinex  Requesting medication to use in his nebulizer.     The following portions of the patient's history were reviewed and updated as appropriate: allergies, current medications, past family history, past medical history, past social history, past surgical history, and problem list.    Objective      Physical Exam  Vitals reviewed.   Cardiovascular:      Rate and Rhythm: Normal rate.      Heart sounds: Normal heart sounds.   Pulmonary:      Effort: Pulmonary effort is normal. No respiratory distress.      Breath sounds: Wheezing present. No rhonchi.   Neurological:      Mental Status: He is alert.        Result Review :                Assessment and Plan    Diagnoses and all orders for this visit:    1. Acute cough (Primary)  -     POCT SARS-CoV-2 Antigen CHRISTAL + Flu    2. COVID-19 virus infection  -     Nirmatrelvir & Ritonavir, 300mg/100mg, (PAXLOVID) 20 x 150 MG & 10 x 100MG tablet therapy pack tablet; Take 3 tablets by mouth 2 (Two) Times a Day for 5 days.  Dispense: 30 tablet; Refill: 0  -     ipratropium-albuterol (DUO-NEB) 0.5-2.5 mg/3 ml nebulizer; Take 3 mL by nebulization Every 4 (Four) Hours As Needed for Wheezing.  Dispense: 120 mL; Refill: 0    3. Warfarin anticoagulation  -     POCT INR  -     Ambulatory Referral to Anticoagulation Monitoring    COVID positive, flu negative   INR 1.7. Will hold off on adjustment, as he will be taking Paxlovid. Recheck INR in 1 week. Will place referral to anticoagulation clinic, as  no one is monitoring.   Go to ER if symptoms worsen.       Follow Up   No follow-ups on file.  Patient was given instructions and counseling regarding his condition or for health maintenance advice. Please see specific information pulled into the AVS if appropriate.

## 2024-01-12 ENCOUNTER — TELEPHONE (OUTPATIENT)
Dept: PHARMACY | Facility: HOSPITAL | Age: 54
End: 2024-01-12
Payer: MEDICARE

## 2024-01-12 NOTE — TELEPHONE ENCOUNTER
Referral receive (work queue) from Dr. Celestin, she has not signed CCA so have messaged provider to obtain so we can reach out to the patient. Will also try for referral from Dr Hernandez.

## 2024-01-31 ENCOUNTER — ANTICOAGULATION VISIT (OUTPATIENT)
Dept: PHARMACY | Facility: HOSPITAL | Age: 54
End: 2024-01-31
Payer: MEDICARE

## 2024-01-31 ENCOUNTER — HOSPITAL ENCOUNTER (OUTPATIENT)
Dept: GENERAL RADIOLOGY | Facility: HOSPITAL | Age: 54
Discharge: HOME | End: 2024-01-31
Admitting: PHYSICIAN ASSISTANT
Payer: MEDICARE

## 2024-01-31 ENCOUNTER — OFFICE VISIT (OUTPATIENT)
Dept: FAMILY MEDICINE CLINIC | Facility: CLINIC | Age: 54
End: 2024-01-31
Payer: MEDICARE

## 2024-01-31 VITALS
RESPIRATION RATE: 23 BRPM | HEART RATE: 89 BPM | SYSTOLIC BLOOD PRESSURE: 134 MMHG | OXYGEN SATURATION: 97 % | DIASTOLIC BLOOD PRESSURE: 86 MMHG | HEIGHT: 65 IN | TEMPERATURE: 97.8 F | BODY MASS INDEX: 52.48 KG/M2 | WEIGHT: 315 LBS

## 2024-01-31 DIAGNOSIS — I42.0 DILATED CARDIOMYOPATHY: ICD-10-CM

## 2024-01-31 DIAGNOSIS — I42.0 DILATED CARDIOMYOPATHY: Primary | ICD-10-CM

## 2024-01-31 DIAGNOSIS — Z79.01 ANTICOAGULATED ON COUMADIN: ICD-10-CM

## 2024-01-31 DIAGNOSIS — R42 DIZZINESS: ICD-10-CM

## 2024-01-31 DIAGNOSIS — R60.0 BILATERAL LOWER EXTREMITY EDEMA: ICD-10-CM

## 2024-01-31 DIAGNOSIS — R51.9 INCREASED FREQUENCY OF HEADACHES: ICD-10-CM

## 2024-01-31 DIAGNOSIS — H57.13 EYE PAIN, BILATERAL: ICD-10-CM

## 2024-01-31 DIAGNOSIS — Z79.01 WARFARIN ANTICOAGULATION: Primary | ICD-10-CM

## 2024-01-31 DIAGNOSIS — R06.02 SOB (SHORTNESS OF BREATH): Primary | ICD-10-CM

## 2024-01-31 DIAGNOSIS — R05.3 PERSISTENT COUGH: ICD-10-CM

## 2024-01-31 LAB — INR PPP: 8 (ref 0.9–1.1)

## 2024-01-31 PROCEDURE — 71046 X-RAY EXAM CHEST 2 VIEWS: CPT

## 2024-01-31 RX ORDER — FUROSEMIDE 40 MG/1
40 TABLET ORAL DAILY
Qty: 30 TABLET | Refills: 0 | Status: SHIPPED | OUTPATIENT
Start: 2024-01-31

## 2024-01-31 NOTE — PROGRESS NOTES
Anticoagulation Clinic Progress Note  Indication: Atrial Fibrillation, Hx of CVA (09/2023)  Referring Provider: Matt Hernandez (referral received on 1/23/24)  Initial Warfarin Start Date: 09/2023  Planned Duration of Therapy: Lifelong   Goal INR: 2-3  Current Drug Interactions: Hydrocodone/Acetaminophen, Tramadol, Furosemide   Other: [PREVIOUS ANTICOAGULATION, ETC]  [id number]: 4 (CHF, HTN, hx of CVA)  Bleed Risk: 2--No history of bleeding     Diet: Normally avoids (1/31/24)  Alcohol: None   Tobacco: No   OTC Pain Medication: Acetaminophen PRN     Anticoagulation Clinic INR History:  Date 1/31          Total Weekly Dose 80 mg          INR 8.0          Notes             Clinic Interview:  Tablet Strength: Warfarin 10 mg tablets   Estimated OOP cost: [please send to registration if not already done]  Verbal Release: Will have patient fill out at clinic appointment  Contact information: [phone]    Patient Findings    Negatives: Signs/symptoms of thrombosis, Signs/symptoms of bleeding, Laboratory test error suspected, Change in health, Change in alcohol use, Change in activity, Upcoming invasive procedure, Emergency department visit, Upcoming dental procedure, Missed doses, Extra doses, Change in medications, Change in diet/appetite, Hospital admission, Bruising, Other complaints   Comments: Mr. Bueno is a new patient to the warfarin clinic. Previously he was managed by his PCP office and has been taking warfarin 10 mg daily since 10/2/23. His last INR prior to today was 1.7 on 1/5 but he stated that he thinks he missed a couple doses prior to that. He has had chronic hematuria since October which he stated has not worsened and is followed by urology for. His next appointment with them is on 2/12/24. He denies any acute symptoms of bleeding at this time including  epistaxis, unusual bruising, hemoptysis, and hematochezia. He is aware to go to the ED if any of this occurs.       Plan:  1. INR largely  supratherapeutic today at 8 per POCT at PCP office (goal INR 2-3). Instructed patient to hold today and tomorrow's warfarin dose until INR recheck on Friday.   2. Recheck INR in clinic on Friday, 2/2. Will complete full warfarin education at this time.   3. Discussed to monitor for s/sx of bleeding including epistaxis, hematuria, unusual bruising, hemoptysis, hematochezia as well as s/sx of stroke including impaired speech, unilateral paralysis, blurry vision and when to seek medical attention  4. Verbal and written information provided. Matt Bueno expresses understanding by teach back and has no further questions at this time.    Mis Jernigan [facility]  1/31/2024  14:59 EST

## 2024-01-31 NOTE — PROGRESS NOTES
"Subjective   Matt Bueno is a 53 y.o. male.     History of Present Illness   Pt presents with on going chest congestion and SOB since COVID earlier this month. Prescribed paxlovid and duo neb at that time     Significant cardiovascular hx with CHF/Cardiomyopathy and has cardiac defibrillator   Hx of CVA last fall   Is on coumadin therapy but not getting INR routinely monitored. Started taking coumadin 10 mg more routinely and is prescribed this dose once daily   Follows with cardiology   Has not been taking lasix in some time but notes his LE edema has been worse recently     Notes new symptoms of blurred vision and pressure behind eyes . L eye is blood shot today. No pain   Sister had brain tumor and patient is concerned for this   CTA in September that did not show any concerning findings for a mass.     Pt is on Cpap therapy     Pt notes he has been urinating blood for awhile and is in the process of getting a work up on his kidneys       The following portions of the patient's history were reviewed and updated as appropriate: allergies, current medications, past family history, past medical history, past social history, past surgical history, and problem list.    Review of Systems  As noted per HPI     Objective   Blood pressure 134/86, pulse 89, temperature 97.8 °F (36.6 °C), resp. rate 23, height 165.1 cm (65\"), weight (!) 182 kg (401 lb 6.4 oz), SpO2 97%.   Physical Exam  Constitutional:       Appearance: Normal appearance.   Eyes:      Extraocular Movements: Extraocular movements intact.      Conjunctiva/sclera:      Left eye: Hemorrhage present.      Pupils: Pupils are equal, round, and reactive to light.   Cardiovascular:      Rate and Rhythm: Normal rate and regular rhythm.   Pulmonary:      Effort: Pulmonary effort is normal.      Breath sounds: Normal breath sounds.   Musculoskeletal:      Right lower le+ Pitting Edema present.      Left lower le+ Pitting Edema present.   Neurological:    "   Mental Status: He is alert and oriented to person, place, and time.   Psychiatric:         Mood and Affect: Mood normal.         Behavior: Behavior normal.         Assessment & Plan   Diagnoses and all orders for this visit:    1. SOB (shortness of breath) (Primary)  -     XR Chest PA & Lateral  -     CBC w AUTO Differential  -     Comprehensive metabolic panel  -     D-dimer, Quantitative    2. Persistent cough    3. Dilated cardiomyopathy  -     XR Chest PA & Lateral  -     BNP ([facility] Only)    4. Bilateral lower extremity edema  -     XR Chest PA & Lateral    5. Increased frequency of headaches  -     CT Head Without Contrast; Future    6. Dizziness  -     CT Head Without Contrast; Future    7. Eye pain, bilateral  -     CT Head Without Contrast; Future    8. Anticoagulated on Coumadin  -     POCT INR  -     Protime-INR    Other orders  -     Iron Profile  -     Vitamin B12 & Folate  -     Written Authorization    INR over 8 today in office! Hold coumadin and will get patient worked into coumadin clinic this week   Labs as outlined in plan   Concerned he is having a CHF exacerbation, proceed with chest XR as noted.   Hold off on antibiotic due to uncontrolled INR pending XR review   CT for neuro concerns   Start back on lasix and follow up with cardiology as directed   ER if new or worsening symptoms develop.

## 2024-02-01 ENCOUNTER — TELEPHONE (OUTPATIENT)
Dept: CARDIOLOGY | Facility: CLINIC | Age: 54
End: 2024-02-01
Payer: MEDICARE

## 2024-02-01 ENCOUNTER — NURSE TRIAGE (OUTPATIENT)
Dept: CALL CENTER | Facility: HOSPITAL | Age: 54
End: 2024-02-01
Payer: MEDICARE

## 2024-02-01 LAB
ALBUMIN SERPL-MCNC: 4 G/DL (ref 3.8–4.9)
ALBUMIN/GLOB SERPL: 1.6 {RATIO} (ref 1.2–2.2)
ALP SERPL-CCNC: 57 IU/L (ref 44–121)
ALT SERPL-CCNC: 8 IU/L (ref 0–44)
AST SERPL-CCNC: 13 IU/L (ref 0–40)
BASOPHILS # BLD AUTO: 0.1 X10E3/UL (ref 0–0.2)
BASOPHILS NFR BLD AUTO: 1 %
BILIRUB SERPL-MCNC: 0.7 MG/DL (ref 0–1.2)
BNP SERPL-MCNC: 636.1 PG/ML (ref 0–100)
BUN SERPL-MCNC: 15 MG/DL (ref 6–24)
BUN/CREAT SERPL: 23 (ref 9–20)
CALCIUM SERPL-MCNC: 8.7 MG/DL (ref 8.7–10.2)
CHLORIDE SERPL-SCNC: 104 MMOL/L (ref 96–106)
CO2 SERPL-SCNC: 21 MMOL/L (ref 20–29)
CREAT SERPL-MCNC: 0.66 MG/DL (ref 0.76–1.27)
D DIMER PPP FEU-MCNC: 0.47 MG/L FEU (ref 0–0.49)
EGFRCR SERPLBLD CKD-EPI 2021: 111 ML/MIN/1.73
EOSINOPHIL # BLD AUTO: 0.2 X10E3/UL (ref 0–0.4)
EOSINOPHIL NFR BLD AUTO: 2 %
ERYTHROCYTE [DISTWIDTH] IN BLOOD BY AUTOMATED COUNT: 14.7 % (ref 11.6–15.4)
GLOBULIN SER CALC-MCNC: 2.5 G/DL (ref 1.5–4.5)
GLUCOSE SERPL-MCNC: 130 MG/DL (ref 70–99)
HCT VFR BLD AUTO: 31.8 % (ref 37.5–51)
HGB BLD-MCNC: 9.8 G/DL (ref 13–17.7)
IMM GRANULOCYTES # BLD AUTO: 0 X10E3/UL (ref 0–0.1)
IMM GRANULOCYTES NFR BLD AUTO: 0 %
INR PPP: >10 (ref 0.9–1.2)
LYMPHOCYTES # BLD AUTO: 1.3 X10E3/UL (ref 0.7–3.1)
LYMPHOCYTES NFR BLD AUTO: 15 %
MCH RBC QN AUTO: 25.2 PG (ref 26.6–33)
MCHC RBC AUTO-ENTMCNC: 30.8 G/DL (ref 31.5–35.7)
MCV RBC AUTO: 82 FL (ref 79–97)
MONOCYTES # BLD AUTO: 0.8 X10E3/UL (ref 0.1–0.9)
MONOCYTES NFR BLD AUTO: 9 %
NEUTROPHILS # BLD AUTO: 6.5 X10E3/UL (ref 1.4–7)
NEUTROPHILS NFR BLD AUTO: 73 %
PLATELET # BLD AUTO: 308 X10E3/UL (ref 150–450)
POTASSIUM SERPL-SCNC: 4.4 MMOL/L (ref 3.5–5.2)
PROT SERPL-MCNC: 6.5 G/DL (ref 6–8.5)
PROTHROMBIN TIME: >90 SEC (ref 9.1–12)
RBC # BLD AUTO: 3.89 X10E6/UL (ref 4.14–5.8)
SODIUM SERPL-SCNC: 140 MMOL/L (ref 134–144)
WBC # BLD AUTO: 8.9 X10E3/UL (ref 3.4–10.8)

## 2024-02-01 NOTE — TELEPHONE ENCOUNTER
Caller: Matt Bueno    Relationship: Self    Best call back number: [phone]    What is the best time to reach you: ANY    Who are you requesting to speak with (clinical staff, provider,  specific staff member): ANY      What was the call regarding: PT HAS TESTS DONE YESTERDAY AND WAS TOLD HE COULD BE THE FIRST STAGES OF CHF, HIS LEFT EYE IS BLOOD SHOT RED AND HAS A LOT OF PRESSURE. LEFT A MESSAGE FOR THE NURSE TO CALL HIM BACK, IS REALLY WANTING TO TALK TO SOMEONE TODAY. IS TRYING TO AVOID GOING TO THE ER.     Is it okay if the provider responds through MyChart: NO

## 2024-02-01 NOTE — TELEPHONE ENCOUNTER
"Villalobos from "[employer] called with a critical lab value. She reported that the patient's INR was 10.0. The lab was drawn on 01/31/2024 at 1212 pm. The order provider was Jesus DUBOSE.   Notified Dr Tere Celestin via secure chat of the critical lab. She said that she was aware and that they had completed an INR test in the office yesterday also.   No further orders were given.     Reason for Disposition   Lab or radiology calling with test results    Additional Information   Negative: Lab calling with strep throat test results and triager can call in prescription   Negative: Lab calling with urinalysis test results and triager can call in prescription   Negative: Medication questions   Negative: Medication renewal and refill questions   Negative: Pre-operative or pre-procedural questions   Negative: ED call to PCP (i.e., primary care provider; doctor, NP, or PA)   Negative: Doctor (or NP/PA) call to PCP   Negative: Call about patient who is currently hospitalized   Negative: Lab or radiology calling with CRITICAL test results   Negative: [1] Follow-up call from patient regarding patient's clinical status AND [2] information urgent   Negative: [1] Caller requests to speak ONLY to PCP AND [2] URGENT question   Negative: [1] Caller requests to speak to PCP now AND [2] won't tell us reason for call  (Exception: If 10 pm to 6 am, caller must first discuss reason for the call.)   Negative: Notification of hospital admission   Negative: Notification of death   Negative: Caller requesting lab results  (Exception: Routine or non-urgent lab result.)    Answer Assessment - Initial Assessment Questions  1. REASON FOR CALL or QUESTION: \"What is your reason for calling today?\" or \"How can I best  help you?\" or \"What question do you have that I can help answer?\"      INR 10.0 drawn on 1/31/2024 at 1212 pm  2. CALLER: Document the source of call. (e.g., laboratory, patient).  Griselda from [facility] [phone]    Protocols used: PCP " Call - No Triage-ADULT-AH

## 2024-02-01 NOTE — TELEPHONE ENCOUNTER
PCP called him this morning and get an ASAP visit, edema, CHF, pneumonia    Symptoms, cough, shortness of breath, dizzy, can't walk without having to stop and catch his breath    HUB message:  PT HAS TESTS DONE YESTERDAY AND WAS TOLD HE COULD BE THE FIRST STAGES OF CHF, HIS LEFT EYE IS BLOOD SHOT RED AND HAS A LOT OF PRESSURE. LEFT A MESSAGE FOR THE NURSE TO CALL HIM BACK, IS REALLY WANTING TO TALK TO SOMEONE TODAY. IS TRYING TO AVOID GOING TO THE ER.                     We don't have anything available next Tues or Weds, please advise

## 2024-02-02 ENCOUNTER — ANTICOAGULATION VISIT (OUTPATIENT)
Dept: PHARMACY | Facility: HOSPITAL | Age: 54
End: 2024-02-02
Payer: MEDICARE

## 2024-02-02 ENCOUNTER — LAB (OUTPATIENT)
Dept: LAB | Facility: HOSPITAL | Age: 54
End: 2024-02-02
Payer: MEDICARE

## 2024-02-02 DIAGNOSIS — I63.9 ACUTE CVA (CEREBROVASCULAR ACCIDENT): ICD-10-CM

## 2024-02-02 DIAGNOSIS — Z79.01 WARFARIN ANTICOAGULATION: Primary | ICD-10-CM

## 2024-02-02 DIAGNOSIS — Z79.01 WARFARIN ANTICOAGULATION: ICD-10-CM

## 2024-02-02 LAB
INR PPP: 5.36 (ref 0.89–1.12)
PROTHROMBIN TIME: 49.4 SECONDS (ref 12.2–14.5)

## 2024-02-02 PROCEDURE — 36415 COLL VENOUS BLD VENIPUNCTURE: CPT

## 2024-02-02 PROCEDURE — 85610 PROTHROMBIN TIME: CPT

## 2024-02-02 PROCEDURE — G0463 HOSPITAL OUTPT CLINIC VISIT: HCPCS

## 2024-02-02 NOTE — PROGRESS NOTES
Anticoagulation Clinic Progress Note  Indication: Atrial Fibrillation, Hx of CVA (09/2023)  Referring Provider: Matt Hernandez (referral received on 1/23/24)  Initial Warfarin Start Date: 09/2023  Planned Duration of Therapy: Lifelong   Goal INR: 2-3  Current Drug Interactions: Hydrocodone/Acetaminophen, Tramadol, Furosemide   [id number]: 4 (CHF, HTN, hx of CVA)  Bleed Risk: 2--No history of bleeding     Diet: Normally avoids (1/31/24)  Alcohol: None   Tobacco: No   OTC Pain Medication: Acetaminophen PRN     Anticoagulation Clinic INR History:  Date 1/31 2/2         Total Weekly Dose 70 mg 50 mg         INR 8.0 5.36   > 8 POCT         Notes  Hold x2           Clinic Interview:  Tablet Strength: Warfarin 10 mg tablets   Estimated OOP cost: [please send to registration if not already done]  Verbal Release Authorization signed on 2/2/24 -- may speak with wife     Contact information: [phone]    Patient Findings    Negatives: Signs/symptoms of thrombosis, Signs/symptoms of bleeding, Laboratory test error suspected, Change in health, Change in alcohol use, Change in activity, Upcoming invasive procedure, Emergency department visit, Upcoming dental procedure, Missed doses, Extra doses, Change in medications, Change in diet/appetite, Hospital admission, Bruising, Other complaints   Comments: Has eye redness, not affecting his vision-- aware to seek medical attn should it worsen       Matt Bueno is a new start to warfarin therapy. He was with his wife in clinic for counseling. Discussed warfarin's indication, mechanism, and dosing. Also enforced the importance of taking warfarin as instructed and at the same time every day, preferably in the evening so that we can make dose adjustments more easily following subsequent clinic visits. He expressed understanding of this fact. We also discussed what he should do about a missed dose; pts can take missed doses within about 12 hours of their usual scheduled dose,  but he was instructed on the importance of not doubling up on doses unless told to do so by the warfarin clinic. Explained possible side effects of warfarin therapy, including increased risk of bleeding, s/sx of bleeding and s/sx of any additional clots/PE/CVA. Also discussed monitoring of warfarin, the INR, goal INR range 2 - 3, and the frequency of monitoring. Explained that he would be coming into the clinic more frequently in these first few weeks of therapy as we try to adjust his dose and achieve a therapeutic INR x 2 consecutive readings. Once that is achieved, pt will follow up in clinic every 4 weeks, on average. He stated no problems with transportation or scheduling clinic appts in this manner. Reviewed drug/food/tobacco/EtOH interactions and provided written information covering these topics in more detail, explaining that green, leafy vegetables interact most heavily with warfarin. Instructed the pt not to take or discontinue any medications without informing his physician/pharmacist and reminded him to inform us of any dietary changes, as well. He expressed understanding of the information provided and has no additional questions at this time.    Patient had INR reading >4.5.  [facility] recommended for patient to have a lab draw to confirm the readings; Patient was educated about the variability of the test strip readings at values >4.5 and was also educated to monitor for any signs excessive bleeding (including in the urine, stool, emesis, etc.).    Discussed to monitor for s/sx of bleeding including epistaxis, hematuria, unusual bruising, hemoptysis, hematochezia as well as s/sx of stroke including impaired speech, unilateral paralysis, blurry vision and when to seek medical attention, pt not interested in going to ED but aware of low threshold to report to ED if s/sx bleeding occur        Plan:  1. INR critically elevated // supratherapeutic today at 8 per POCT however 5.36 via  venipuncture following held doses (goal INR 2-3). Instructed patient to HOLD  warfarin dose until INR recheck. Pt/wife agreeable to eating servings of cooked greens (spinach) over the weekend. At least 1x large (>1 cup)  serving by Monday.   2. Recheck INR Monday  3. Discussed to monitor for s/sx of bleeding including epistaxis, hematuria, unusual bruising, hemoptysis, hematochezia as well as s/sx of stroke including impaired speech, unilateral paralysis, blurry vision and when to seek medical attention  4. Verbal and written information provided. Matt Garciadaniela expresses understanding by teach back and has no further questions at this time.  5. Paper referral received from Dr David Mcknight, PharmD  2/2/2024  11:25 EST

## 2024-02-03 LAB
FOLATE SERPL-MCNC: 6.6 NG/ML
IRON SATN MFR SERPL: 11 % (ref 15–55)
IRON SERPL-MCNC: 32 UG/DL (ref 38–169)
TIBC SERPL-MCNC: 292 UG/DL (ref 250–450)
UIBC SERPL-MCNC: 260 UG/DL (ref 111–343)
VIT B12 SERPL-MCNC: 272 PG/ML (ref 232–1245)
WRITTEN AUTHORIZATION: NORMAL

## 2024-02-05 DIAGNOSIS — D50.9 IRON DEFICIENCY ANEMIA, UNSPECIFIED IRON DEFICIENCY ANEMIA TYPE: Primary | ICD-10-CM

## 2024-02-06 ENCOUNTER — LAB (OUTPATIENT)
Dept: LAB | Facility: HOSPITAL | Age: 54
End: 2024-02-06
Payer: MEDICARE

## 2024-02-06 ENCOUNTER — ANTICOAGULATION VISIT (OUTPATIENT)
Dept: PHARMACY | Facility: HOSPITAL | Age: 54
End: 2024-02-06
Payer: MEDICARE

## 2024-02-06 DIAGNOSIS — Z79.01 WARFARIN ANTICOAGULATION: Primary | ICD-10-CM

## 2024-02-06 DIAGNOSIS — I63.9 ACUTE CVA (CEREBROVASCULAR ACCIDENT): ICD-10-CM

## 2024-02-06 DIAGNOSIS — Z79.01 WARFARIN ANTICOAGULATION: ICD-10-CM

## 2024-02-06 LAB
INR PPP: 1.56 (ref 0.9–1.1)
PROTHROMBIN TIME: 19.3 SECONDS (ref 12.3–15.1)

## 2024-02-06 PROCEDURE — 85610 PROTHROMBIN TIME: CPT

## 2024-02-06 PROCEDURE — 36415 COLL VENOUS BLD VENIPUNCTURE: CPT

## 2024-02-06 NOTE — PROGRESS NOTES
Anticoagulation Clinic Progress Note  Indication: Atrial Fibrillation, Hx of CVA (09/2023)  Referring Provider: Matt Hernandez (referral received on 1/23/24)  Initial Warfarin Start Date: 09/2023  Planned Duration of Therapy: Lifelong   Goal INR: 2-3  Current Drug Interactions: Hydrocodone/Acetaminophen, Tramadol, Furosemide   [id number]: 4 (CHF, HTN, hx of CVA)  Bleed Risk: 2--No history of bleeding     Diet: Normally avoids (1/31/24)  Alcohol: None   Tobacco: No   OTC Pain Medication: Acetaminophen PRN     Anticoagulation Clinic INR History:  Date 1/31 2/2 2/6        Total Weekly Dose 70 mg 50 mg 10 mg        INR 8.0 5.36   > 8 POCT 1.56        Notes  Hold x2 Hold x6          Clinic Interview:  Tablet Strength: Warfarin 10 mg tablets   Estimated OOP cost: [please send to registration if not already done]  Verbal Release Authorization signed on 2/2/24 -- may speak with wife     Contact information: [phone]    Patient Findings    Negatives: Signs/symptoms of thrombosis, Signs/symptoms of bleeding, Laboratory test error suspected, Change in health, Change in alcohol use, Change in activity, Upcoming invasive procedure, Emergency department visit, Upcoming dental procedure, Missed doses, Extra doses, Change in medications, Change in diet/appetite, Hospital admission, Bruising, Other complaints   Comments: Starting an iron supplement, reports eye redness improving           Plan:  1. INR now SUBtherapeutic today at 1.56 following held doses (goal INR 2-3).Instructed patient to begin warfarin 5mg daily until INR recheck.   2. Recheck INR Friday in clinic prior to [facility] appt  3. Discussed to monitor for s/sx of bleeding including epistaxis, hematuria, unusual bruising, hemoptysis, hematochezia as well as s/sx of stroke including impaired speech, unilateral paralysis, blurry vision and when to seek medical attention  4. Verbal and written information provided. Matt Bueno expresses understanding by teach back  and has no further questions at this time.    Janice Mcknight, PharmD.  02/06/24   15:51 EST

## 2024-02-09 ENCOUNTER — TELEPHONE (OUTPATIENT)
Dept: CARDIOLOGY | Facility: CLINIC | Age: 54
End: 2024-02-09

## 2024-02-09 ENCOUNTER — OFFICE VISIT (OUTPATIENT)
Dept: CARDIOLOGY | Facility: CLINIC | Age: 54
End: 2024-02-09
Payer: MEDICARE

## 2024-02-09 ENCOUNTER — TELEPHONE (OUTPATIENT)
Dept: PHARMACY | Facility: HOSPITAL | Age: 54
End: 2024-02-09

## 2024-02-09 ENCOUNTER — ANTICOAGULATION VISIT (OUTPATIENT)
Dept: PHARMACY | Facility: HOSPITAL | Age: 54
End: 2024-02-09
Payer: MEDICARE

## 2024-02-09 VITALS
SYSTOLIC BLOOD PRESSURE: 114 MMHG | HEART RATE: 92 BPM | DIASTOLIC BLOOD PRESSURE: 68 MMHG | WEIGHT: 315 LBS | BODY MASS INDEX: 52.48 KG/M2 | OXYGEN SATURATION: 100 % | HEIGHT: 65 IN

## 2024-02-09 DIAGNOSIS — I48.19 PERSISTENT ATRIAL FIBRILLATION: ICD-10-CM

## 2024-02-09 DIAGNOSIS — I10 ESSENTIAL HYPERTENSION: ICD-10-CM

## 2024-02-09 DIAGNOSIS — Z79.01 WARFARIN ANTICOAGULATION: Primary | ICD-10-CM

## 2024-02-09 DIAGNOSIS — I50.23 ACUTE ON CHRONIC HFREF (HEART FAILURE WITH REDUCED EJECTION FRACTION): Primary | ICD-10-CM

## 2024-02-09 DIAGNOSIS — J06.9 ACUTE URI: ICD-10-CM

## 2024-02-09 LAB
INR PPP: 1.9 (ref 0.91–1.09)
PROTHROMBIN TIME: 23 SECONDS (ref 10–13.8)

## 2024-02-09 PROCEDURE — 36416 COLLJ CAPILLARY BLOOD SPEC: CPT

## 2024-02-09 PROCEDURE — 85610 PROTHROMBIN TIME: CPT

## 2024-02-09 PROCEDURE — G0463 HOSPITAL OUTPT CLINIC VISIT: HCPCS

## 2024-02-09 RX ORDER — AZITHROMYCIN 250 MG/1
TABLET, FILM COATED ORAL
Qty: 6 TABLET | Refills: 0 | Status: SHIPPED | OUTPATIENT
Start: 2024-02-09

## 2024-02-09 NOTE — H&P (VIEW-ONLY)
Subjective:     Encounter Date:02/09/2024    Primary Care Physician: Stas Rust MD      Patient ID: Matt Bueno is a 53 y.o. male.    Chief Complaint:Shortness of Breath and Dizziness      PROBLEM LIST:  Nonischemic cardiomyopathy:  Echocardiogram, 10/31/2018: EF 25%. Mild MR. RVSP 57 mmHg.  [facility], 11/16/2018: Normal coronary arteries. Dilated cardiomyopathy with EF 15%. Significantly elevated LVEDP.  Echocardiogram, 02/20/2019: EF 25%. Mild-to-moderate MR.  ICD placement, 04/01/2019, Dr. Day: [employer] VVI ICD, model Dynagen EL ICD VR, serial #[id number].  Echocardiogram, 02/03/20: EF 30%. Mild MR.  Echo, 09/29/2023: EF 36-40%. Left ventricular diastolic function is consistent with (grade II w/high LAP) pseudonormalization. The left atrial cavity is moderate to severely dilated. Left atrial volume is mildly increased. Negative saline test. Normal RVSP.  Carotid duplex, 09/29/2023: Mild plaque within the left carotid bulb causing 50% stenosis or less. No plaque identified in the right carotid bulb, no stenosis seen.  Morbid obesity:  Peak weight 500 lbs.  CVA  9/23/2023, left MCA territory.  Presumed thromboembolic, placed on warfarin  Paroxysmal atrial fibrillation   Asymptomatic, noted on device check.  sleep apnea on CPAP  Incomplete LBBB  Surgeries:  Gastric sleeve  Left knee arthroscopy  Bilateral shoulder surgery  [place] teeth extracted      No Known Allergies      Current Outpatient Medications:     atorvastatin (LIPITOR) 80 MG tablet, Take 1 tablet by mouth Every Night., Disp: 90 tablet, Rfl: 0    carvedilol (COREG) 12.5 MG tablet, Take 1 tablet by mouth 2 (Two) Times a Day With Meals., Disp: 180 tablet, Rfl: 3    furosemide (LASIX) 40 MG tablet, Take 1 tablet by mouth Daily., Disp: 30 tablet, Rfl: 0    HYDROcodone-acetaminophen (NORCO)  MG per tablet, Take 1 tablet by mouth Every 6 (Six) Hours., Disp: , Rfl:     ipratropium-albuterol (DUO-NEB) 0.5-2.5 mg/3 ml nebulizer, Take 3 mL by  nebulization Every 4 (Four) Hours As Needed for Wheezing., Disp: 120 mL, Rfl: 0    Iron, Ferrous Sulfate, 325 (65 Fe) MG tablet, Take 1 tablet by mouth Daily., Disp: 30 tablet, Rfl: 2    omeprazole (priLOSEC) 20 MG capsule, Take 1 capsule by mouth Daily., Disp: , Rfl:     sacubitril-valsartan (ENTRESTO)  MG tablet, Take 1 tablet by mouth 2 (Two) Times a Day. Hold if SBP < 100, Disp: 180 tablet, Rfl: 3    traMADol (ULTRAM) 50 MG tablet, Take 1 tablet by mouth Every 12 (Twelve) Hours., Disp: , Rfl:     warfarin (Coumadin) 10 MG tablet, Take 10 mg every evening (Patient taking differently: Take 0.5 tablets by mouth Every Night. Take 10 mg every evening. As of 2/9/24, reported down to 5 mg), Disp: 30 tablet, Rfl: 2    azithromycin (Zithromax Z-Edouard) 250 MG tablet, Take 2 tablets by mouth on day 1, then 1 tablet daily on days 2-5, Disp: 6 tablet, Rfl: 0        History of Present Illness    Patient is a 53-year-old  male who presents today for further evaluation of shortness of breath, dizziness, recent heart failure.  He has previous history of nonischemic cardiomyopathy.  Patient suffered CVA back in September of last year and was initiated on warfarin therapy.  Has been having some issues with labile INRs.  Unclear, why warfarin was used over NOAC.  When last seen in the office patient was noted to have some irregular high ventricular rates which were felt to be possible atrial fibrillation.  No EKG has been performed since 2019.  Patient was noted to have COVID in January and since that time has felt unwell.  EKG today suggest atrial fibrillation.  He was seen by his primary care last week and at that time was initiated on Lasix therapy.  Notes that this has been helping his edema and somewhat his shortness of breath but still has shortness of breath and cough.  Complains of continued sinus congestion as well.  Notes that his primary care provider would not prescribe him an antibiotic secondary to recent  "supratherapeutic INR's.    The following portions of the patient's history were reviewed and updated as appropriate: allergies, current medications, past family history, past medical history, past social history, past surgical history and problem list.      Social History     Tobacco Use    Smoking status: Never     Passive exposure: Past    Smokeless tobacco: Never   Vaping Use    Vaping Use: Never used   Substance Use Topics    Alcohol use: Yes     Comment: VERY rarely    Drug use: No         ROS       Objective:   /68 (BP Location: Right arm, Patient Position: Sitting, Cuff Size: Adult)   Pulse 92   Ht 165.1 cm (65\")   Wt (!) 177 kg (391 lb 3.2 oz)   SpO2 100%   BMI 65.10 kg/m²         Vitals reviewed.   Constitutional:       Appearance: Well-developed and not in distress.   Neck:      Vascular: No JVD.      Trachea: No tracheal deviation.   Pulmonary:      Effort: Pulmonary effort is normal.      Breath sounds: Normal breath sounds.   Cardiovascular:      Normal rate. Irregularly irregular rhythm.      Murmurs: There is no murmur.   Edema:     Peripheral edema present.     Ankle: bilateral 1+ edema of the ankle.  Abdominal:      General: Bowel sounds are normal.      Tenderness: There is no abdominal tenderness.   Musculoskeletal:         General: No deformity. Neurological:      Mental Status: Alert and oriented to person, place, and time.           ECG 12 Lead    Date/Time: 2/9/2024 2:37 PM  Performed by: Noy Argueta APRN    Authorized by: Noy Argueta APRN  Comparison: compared with previous ECG from 4/1/2019  Comparison to previous ECG: Sinus rhythm has been replaced by atrial fibrillation.  Rhythm: atrial fibrillation      Device check:  Normal functioning single-chamber ICD.  RV paced less than 1%.  High ventricular rate episodes longest of 1 minute 9 seconds.  Ventricular rates ranging from 170-180.  13 episodes.  No reprogramming.  Battery voltage of 11 years.          Assessment: "   Assessment & Plan      Diagnoses and all orders for this visit:    1. Acute on chronic HFrEF (heart failure with reduced ejection fraction) (Primary), improving since addition of Lasix by primary care.  Still with shortness of breath and cough.    2. Essential hypertension, stable.  On beta-blocker.    3. Persistent atrial fibrillation, noted on EKG today.  Some intermittent elevated rates on and device interrogation.  Unclear duration but possibly 1 month or so.  On warfarin.  However, INRs have been labile.    4. Acute URI, noted sinus congestion.    Other orders  -     azithromycin (Zithromax Z-Edouard) 250 MG tablet; Take 2 tablets by mouth on day 1, then 1 tablet daily on days 2-5  Dispense: 6 tablet; Refill: 0  -     ECG 12 Lead      Plan:  For patient's URI symptoms will prescribe Z-Edouard.  Continue current medications for now.  Reviewed atrial fibrillation with patient in the office today.  Will plan for LESLIE cardioversion in the near term to see if this is contributing to his symptoms.  At time of procedure we will consider transitioning warfarin to NOAC given labile INRs.  Follow-up post procedure.       Noy GANT             Dictated utilizing Dragon dictation

## 2024-02-09 NOTE — LETTER
February 9, 2024       No Recipients    Patient: Matt Bueno   YOB: 1970   Date of Visit: 2/9/2024     Dear Stas Rust MD:       Thank you for referring Matt Bueno to me for evaluation. Below are the relevant portions of my assessment and plan of care.    If you have questions, please do not hesitate to call me. I look forward to following Matt along with you.         Sincerely,        ALFREDA Sandoval        CC:   No Recipients    Noy Argueta APRN  02/09/24 1440  Sign when Signing Visit  Subjective:     Encounter Date:02/09/2024    Primary Care Physician: Stas Rust MD      Patient ID: Matt Bueno is a 53 y.o. male.    Chief Complaint:Shortness of Breath and Dizziness      PROBLEM LIST:  Nonischemic cardiomyopathy:  Echocardiogram, 10/31/2018: EF 25%. Mild MR. RVSP 57 mmHg.  C, 11/16/2018: Normal coronary arteries. Dilated cardiomyopathy with EF 15%. Significantly elevated LVEDP.  Echocardiogram, 02/20/2019: EF 25%. Mild-to-moderate MR.  ICD placement, 04/01/2019, Dr. Day: [employer] VVI ICD, model Dynagen EL ICD VR, serial #[id number].  Echocardiogram, 02/03/20: EF 30%. Mild MR.  Echo, 09/29/2023: EF 36-40%. Left ventricular diastolic function is consistent with (grade II w/high LAP) pseudonormalization. The left atrial cavity is moderate to severely dilated. Left atrial volume is mildly increased. Negative saline test. Normal RVSP.  Carotid duplex, 09/29/2023: Mild plaque within the left carotid bulb causing 50% stenosis or less. No plaque identified in the right carotid bulb, no stenosis seen.  Morbid obesity:  Peak weight 500 lbs.  CVA  9/23/2023, left MCA territory.  Presumed thromboembolic, placed on warfarin  Paroxysmal atrial fibrillation   Asymptomatic, noted on device check.  sleep apnea on CPAP  Incomplete LBBB  Surgeries:  Gastric sleeve  Left knee arthroscopy  Bilateral shoulder surgery  [place] teeth extracted      No Known  Allergies      Current Outpatient Medications:   •  atorvastatin (LIPITOR) 80 MG tablet, Take 1 tablet by mouth Every Night., Disp: 90 tablet, Rfl: 0  •  carvedilol (COREG) 12.5 MG tablet, Take 1 tablet by mouth 2 (Two) Times a Day With Meals., Disp: 180 tablet, Rfl: 3  •  furosemide (LASIX) 40 MG tablet, Take 1 tablet by mouth Daily., Disp: 30 tablet, Rfl: 0  •  HYDROcodone-acetaminophen (NORCO)  MG per tablet, Take 1 tablet by mouth Every 6 (Six) Hours., Disp: , Rfl:   •  ipratropium-albuterol (DUO-NEB) 0.5-2.5 mg/3 ml nebulizer, Take 3 mL by nebulization Every 4 (Four) Hours As Needed for Wheezing., Disp: 120 mL, Rfl: 0  •  Iron, Ferrous Sulfate, 325 (65 Fe) MG tablet, Take 1 tablet by mouth Daily., Disp: 30 tablet, Rfl: 2  •  omeprazole (priLOSEC) 20 MG capsule, Take 1 capsule by mouth Daily., Disp: , Rfl:   •  sacubitril-valsartan (ENTRESTO)  MG tablet, Take 1 tablet by mouth 2 (Two) Times a Day. Hold if SBP < 100, Disp: 180 tablet, Rfl: 3  •  traMADol (ULTRAM) 50 MG tablet, Take 1 tablet by mouth Every 12 (Twelve) Hours., Disp: , Rfl:   •  warfarin (Coumadin) 10 MG tablet, Take 10 mg every evening (Patient taking differently: Take 0.5 tablets by mouth Every Night. Take 10 mg every evening. As of 2/9/24, reported down to 5 mg), Disp: 30 tablet, Rfl: 2  •  azithromycin (Zithromax Z-Edouard) 250 MG tablet, Take 2 tablets by mouth on day 1, then 1 tablet daily on days 2-5, Disp: 6 tablet, Rfl: 0        History of Present Illness    Patient is a 53-year-old  male who presents today for further evaluation of shortness of breath, dizziness, recent heart failure.  He has previous history of nonischemic cardiomyopathy.  Patient suffered CVA back in September of last year and was initiated on warfarin therapy.  Has been having some issues with labile INRs.  Unclear, why warfarin was used over NOAC.  When last seen in the office patient was noted to have some irregular high ventricular rates which were  "felt to be possible atrial fibrillation.  No EKG has been performed since 2019.  Patient was noted to have COVID in January and since that time has felt unwell.  EKG today suggest atrial fibrillation.  He was seen by his primary care last week and at that time was initiated on Lasix therapy.  Notes that this has been helping his edema and somewhat his shortness of breath but still has shortness of breath and cough.  Complains of continued sinus congestion as well.  Notes that his primary care provider would not prescribe him an antibiotic secondary to recent supratherapeutic INR's.    The following portions of the patient's history were reviewed and updated as appropriate: allergies, current medications, past family history, past medical history, past social history, past surgical history and problem list.      Social History     Tobacco Use   • Smoking status: Never     Passive exposure: Past   • Smokeless tobacco: Never   Vaping Use   • Vaping Use: Never used   Substance Use Topics   • Alcohol use: Yes     Comment: VERY rarely   • Drug use: No         ROS       Objective:   /68 (BP Location: Right arm, Patient Position: Sitting, Cuff Size: Adult)   Pulse 92   Ht 165.1 cm (65\")   Wt (!) 177 kg (391 lb 3.2 oz)   SpO2 100%   BMI 65.10 kg/m²         Vitals reviewed.   Constitutional:       Appearance: Well-developed and not in distress.   Neck:      Vascular: No JVD.      Trachea: No tracheal deviation.   Pulmonary:      Effort: Pulmonary effort is normal.      Breath sounds: Normal breath sounds.   Cardiovascular:      Normal rate. Irregularly irregular rhythm.      Murmurs: There is no murmur.   Edema:     Peripheral edema present.     Ankle: bilateral 1+ edema of the ankle.  Abdominal:      General: Bowel sounds are normal.      Tenderness: There is no abdominal tenderness.   Musculoskeletal:         General: No deformity. Neurological:      Mental Status: Alert and oriented to person, place, and time. "           ECG 12 Lead    Date/Time: 2/9/2024 2:37 PM  Performed by: Noy Argueta APRN    Authorized by: Noy Argueta APRN  Comparison: compared with previous ECG from 4/1/2019  Comparison to previous ECG: Sinus rhythm has been replaced by atrial fibrillation.  Rhythm: atrial fibrillation      Device check:  Normal functioning single-chamber ICD.  RV paced less than 1%.  High ventricular rate episodes longest of 1 minute 9 seconds.  Ventricular rates ranging from 170-180.  13 episodes.  No reprogramming.  Battery voltage of 11 years.          Assessment:   Assessment & Plan     Diagnoses and all orders for this visit:    1. Acute on chronic HFrEF (heart failure with reduced ejection fraction) (Primary), improving since addition of Lasix by primary care.  Still with shortness of breath and cough.    2. Essential hypertension, stable.  On beta-blocker.    3. Persistent atrial fibrillation, noted on EKG today.  Some intermittent elevated rates on and device interrogation.  Unclear duration but possibly 1 month or so.  On warfarin.  However, INRs have been labile.    4. Acute URI, noted sinus congestion.    Other orders  -     azithromycin (Zithromax Z-Edouard) 250 MG tablet; Take 2 tablets by mouth on day 1, then 1 tablet daily on days 2-5  Dispense: 6 tablet; Refill: 0  -     ECG 12 Lead      Plan:  For patient's URI symptoms will prescribe Z-Edouard.  Continue current medications for now.  Reviewed atrial fibrillation with patient in the office today.  Will plan for LESLIE cardioversion in the near term to see if this is contributing to his symptoms.  At time of procedure we will consider transitioning warfarin to NOAC given labile INRs.  Follow-up post procedure.       Noy GANT             Dictated utilizing Dragon dictation

## 2024-02-09 NOTE — TELEPHONE ENCOUNTER
Caller: [id number] - [place], [place] - [address] - [phone]  - [phone] FX    Relationship: Pharmacy    Best call back number: [phone]    What is the best time to reach you: ANY    Who are you requesting to speak with (clinical staff, provider,  specific staff member): CLINICAL    What was the call regarding: PHARMACY CALLED TO INQUIRE ABOUT A DRUG INTERACTION BETWEEN THE ZITHROMAX Z-AMERICA AND THE WARFARIN. PLEASE CONTACT PHARMACY AND ADVISE ON THIS ISSUE.

## 2024-02-09 NOTE — TELEPHONE ENCOUNTER
Pt. Called to inform the the clinic he's been prescribed a Z-pack. Per Meet Juarez, PharmD Instructed pt. To continue Warfarin 5 mg daily and recheck in clinic on Tuesday.    Haris Adam, Pharmacy Technician  2/9/2024  17:07 EST

## 2024-02-09 NOTE — PROGRESS NOTES
Subjective:     Encounter Date:02/09/2024    Primary Care Physician: Stas Rust MD      Patient ID: Matt Bueno is a 53 y.o. male.    Chief Complaint:Shortness of Breath and Dizziness      PROBLEM LIST:  Nonischemic cardiomyopathy:  Echocardiogram, 10/31/2018: EF 25%. Mild MR. RVSP 57 mmHg.  [facility], 11/16/2018: Normal coronary arteries. Dilated cardiomyopathy with EF 15%. Significantly elevated LVEDP.  Echocardiogram, 02/20/2019: EF 25%. Mild-to-moderate MR.  ICD placement, 04/01/2019, Dr. Day: [employer] VVI ICD, model Dynagen EL ICD VR, serial #[id number].  Echocardiogram, 02/03/20: EF 30%. Mild MR.  Echo, 09/29/2023: EF 36-40%. Left ventricular diastolic function is consistent with (grade II w/high LAP) pseudonormalization. The left atrial cavity is moderate to severely dilated. Left atrial volume is mildly increased. Negative saline test. Normal RVSP.  Carotid duplex, 09/29/2023: Mild plaque within the left carotid bulb causing 50% stenosis or less. No plaque identified in the right carotid bulb, no stenosis seen.  Morbid obesity:  Peak weight 500 lbs.  CVA  9/23/2023, left MCA territory.  Presumed thromboembolic, placed on warfarin  Paroxysmal atrial fibrillation   Asymptomatic, noted on device check.  sleep apnea on CPAP  Incomplete LBBB  Surgeries:  Gastric sleeve  Left knee arthroscopy  Bilateral shoulder surgery  Blaine teeth extracted      No Known Allergies      Current Outpatient Medications:     atorvastatin (LIPITOR) 80 MG tablet, Take 1 tablet by mouth Every Night., Disp: 90 tablet, Rfl: 0    carvedilol (COREG) 12.5 MG tablet, Take 1 tablet by mouth 2 (Two) Times a Day With Meals., Disp: 180 tablet, Rfl: 3    furosemide (LASIX) 40 MG tablet, Take 1 tablet by mouth Daily., Disp: 30 tablet, Rfl: 0    HYDROcodone-acetaminophen (NORCO)  MG per tablet, Take 1 tablet by mouth Every 6 (Six) Hours., Disp: , Rfl:     ipratropium-albuterol (DUO-NEB) 0.5-2.5 mg/3 ml nebulizer, Take 3 mL by  nebulization Every 4 (Four) Hours As Needed for Wheezing., Disp: 120 mL, Rfl: 0    Iron, Ferrous Sulfate, 325 (65 Fe) MG tablet, Take 1 tablet by mouth Daily., Disp: 30 tablet, Rfl: 2    omeprazole (priLOSEC) 20 MG capsule, Take 1 capsule by mouth Daily., Disp: , Rfl:     sacubitril-valsartan (ENTRESTO)  MG tablet, Take 1 tablet by mouth 2 (Two) Times a Day. Hold if SBP < 100, Disp: 180 tablet, Rfl: 3    traMADol (ULTRAM) 50 MG tablet, Take 1 tablet by mouth Every 12 (Twelve) Hours., Disp: , Rfl:     warfarin (Coumadin) 10 MG tablet, Take 10 mg every evening (Patient taking differently: Take 0.5 tablets by mouth Every Night. Take 10 mg every evening. As of 2/9/24, reported down to 5 mg), Disp: 30 tablet, Rfl: 2    azithromycin (Zithromax Z-Edouard) 250 MG tablet, Take 2 tablets by mouth on day 1, then 1 tablet daily on days 2-5, Disp: 6 tablet, Rfl: 0        History of Present Illness    Patient is a 53-year-old  male who presents today for further evaluation of shortness of breath, dizziness, recent heart failure.  He has previous history of nonischemic cardiomyopathy.  Patient suffered CVA back in September of last year and was initiated on warfarin therapy.  Has been having some issues with labile INRs.  Unclear, why warfarin was used over NOAC.  When last seen in the office patient was noted to have some irregular high ventricular rates which were felt to be possible atrial fibrillation.  No EKG has been performed since 2019.  Patient was noted to have COVID in January and since that time has felt unwell.  EKG today suggest atrial fibrillation.  He was seen by his primary care last week and at that time was initiated on Lasix therapy.  Notes that this has been helping his edema and somewhat his shortness of breath but still has shortness of breath and cough.  Complains of continued sinus congestion as well.  Notes that his primary care provider would not prescribe him an antibiotic secondary to recent  "supratherapeutic INR's.    The following portions of the patient's history were reviewed and updated as appropriate: allergies, current medications, past family history, past medical history, past social history, past surgical history and problem list.      Social History     Tobacco Use    Smoking status: Never     Passive exposure: Past    Smokeless tobacco: Never   Vaping Use    Vaping Use: Never used   Substance Use Topics    Alcohol use: Yes     Comment: VERY rarely    Drug use: No         ROS       Objective:   /68 (BP Location: Right arm, Patient Position: Sitting, Cuff Size: Adult)   Pulse 92   Ht 165.1 cm (65\")   Wt (!) 177 kg (391 lb 3.2 oz)   SpO2 100%   BMI 65.10 kg/m²         Vitals reviewed.   Constitutional:       Appearance: Well-developed and not in distress.   Neck:      Vascular: No JVD.      Trachea: No tracheal deviation.   Pulmonary:      Effort: Pulmonary effort is normal.      Breath sounds: Normal breath sounds.   Cardiovascular:      Normal rate. Irregularly irregular rhythm.      Murmurs: There is no murmur.   Edema:     Peripheral edema present.     Ankle: bilateral 1+ edema of the ankle.  Abdominal:      General: Bowel sounds are normal.      Tenderness: There is no abdominal tenderness.   Musculoskeletal:         General: No deformity. Neurological:      Mental Status: Alert and oriented to person, place, and time.           ECG 12 Lead    Date/Time: 2/9/2024 2:37 PM  Performed by: Noy Argueta APRN    Authorized by: Noy Argueta APRN  Comparison: compared with previous ECG from 4/1/2019  Comparison to previous ECG: Sinus rhythm has been replaced by atrial fibrillation.  Rhythm: atrial fibrillation      Device check:  Normal functioning single-chamber ICD.  RV paced less than 1%.  High ventricular rate episodes longest of 1 minute 9 seconds.  Ventricular rates ranging from 170-180.  13 episodes.  No reprogramming.  Battery voltage of 11 years.          Assessment: "   Assessment & Plan      Diagnoses and all orders for this visit:    1. Acute on chronic HFrEF (heart failure with reduced ejection fraction) (Primary), improving since addition of Lasix by primary care.  Still with shortness of breath and cough.    2. Essential hypertension, stable.  On beta-blocker.    3. Persistent atrial fibrillation, noted on EKG today.  Some intermittent elevated rates on and device interrogation.  Unclear duration but possibly 1 month or so.  On warfarin.  However, INRs have been labile.    4. Acute URI, noted sinus congestion.    Other orders  -     azithromycin (Zithromax Z-Edouard) 250 MG tablet; Take 2 tablets by mouth on day 1, then 1 tablet daily on days 2-5  Dispense: 6 tablet; Refill: 0  -     ECG 12 Lead      Plan:  For patient's URI symptoms will prescribe Z-Edouard.  Continue current medications for now.  Reviewed atrial fibrillation with patient in the office today.  Will plan for LESLIE cardioversion in the near term to see if this is contributing to his symptoms.  At time of procedure we will consider transitioning warfarin to NOAC given labile INRs.  Follow-up post procedure.       Noy GANT             Dictated utilizing Dragon dictation

## 2024-02-09 NOTE — PROGRESS NOTES
Anticoagulation Clinic Progress Note  Indication: Atrial Fibrillation, Hx of CVA (09/2023)  Referring Provider: Matt Hernandez (referral received on 1/23/24)  Initial Warfarin Start Date: 09/2023  Planned Duration of Therapy: Lifelong   Goal INR: 2-3  Current Drug Interactions: Hydrocodone/Acetaminophen, Tramadol, Furosemide   [id number]: 4 (CHF, HTN, hx of CVA)  Bleed Risk: 2--No history of bleeding     Diet: Normally avoids (1/31/24)  Alcohol: None   Tobacco: No   OTC Pain Medication: Acetaminophen PRN     Anticoagulation Clinic INR History:  Date 1/31 2/2 2/6 2/9       Total Weekly Dose 70 mg 50 mg 10 mg 15 mg       INR 8.0 5.36   > 8 POCT 1.56 1.9       Notes  Hold x2 Hold x6          Clinic Interview:  Tablet Strength: Warfarin 10 mg tablets   Estimated OOP cost: [please send to registration if not already done]  Verbal Release Authorization signed on 2/2/24 -- may speak with wife     Contact information: [phone]    Patient Findings  Positives: Change in medications   Negatives: Signs/symptoms of thrombosis, Signs/symptoms of bleeding, Laboratory test error suspected, Change in health, Change in alcohol use, Change in activity, Upcoming invasive procedure, Emergency department visit, Upcoming dental procedure, Missed doses, Extra doses, Change in diet/appetite, Hospital admission, Bruising, Other complaints   Comments: Patient has not been feeling well for around the past month, PCP was going to start antibiotic but hesitant to so far given elevated INR. Hoping with improved INR now they can do so if wanted. Instructed patient to call us with any medication changes so we are aware (he also has Cardiology appointment following our appointment). Discussed potentially changing tablet strength if needed in near future given inflexibility of current 10 mg tablets, patient agreeable to this but will hold off for now given unclear dose requirement. Eye bloodshot but has been improving. Patient says MD suspects  this is secondary to coughing.       Plan:  1. INR slightly SUBtherapeutic today at 1.9 following held doses and warfarin restart 2/6 (goal INR 2-3). Instructed patient to continue warfarin 5mg daily until INR recheck.   2. Recheck INR Tuesday 2/13 at [facility] via . Consider sending in Rx for new tablet strength depending on result. Discussed this today and patient agreeable, did not do so today given unclear dose requirement.  3. Verbal and written information provided. Matt Bueno expresses understanding by teach back and has no further questions at this time.    Meet Juarez, PharmD, BCPS  2/9/2024  13:55 EST

## 2024-02-14 ENCOUNTER — LAB (OUTPATIENT)
Dept: LAB | Facility: HOSPITAL | Age: 54
End: 2024-02-14
Payer: MEDICARE

## 2024-02-14 ENCOUNTER — ANTICOAGULATION VISIT (OUTPATIENT)
Dept: PHARMACY | Facility: HOSPITAL | Age: 54
End: 2024-02-14
Payer: MEDICARE

## 2024-02-14 DIAGNOSIS — I48.19 ATRIAL FIBRILLATION, PERSISTENT: Primary | ICD-10-CM

## 2024-02-14 DIAGNOSIS — I63.9 ACUTE CVA (CEREBROVASCULAR ACCIDENT): ICD-10-CM

## 2024-02-14 DIAGNOSIS — Z79.01 WARFARIN ANTICOAGULATION: Primary | ICD-10-CM

## 2024-02-14 DIAGNOSIS — Z79.01 WARFARIN ANTICOAGULATION: ICD-10-CM

## 2024-02-14 LAB
INR PPP: 2.26 (ref 0.9–1.1)
PROTHROMBIN TIME: 25.6 SECONDS (ref 12.3–15.1)

## 2024-02-14 PROCEDURE — 85610 PROTHROMBIN TIME: CPT

## 2024-02-14 PROCEDURE — 36415 COLL VENOUS BLD VENIPUNCTURE: CPT

## 2024-02-19 ENCOUNTER — PREP FOR SURGERY (OUTPATIENT)
Dept: OTHER | Facility: HOSPITAL | Age: 54
End: 2024-02-19
Payer: MEDICARE

## 2024-02-19 DIAGNOSIS — I48.19 ATRIAL FIBRILLATION, PERSISTENT: Primary | ICD-10-CM

## 2024-02-19 RX ORDER — ONDANSETRON 2 MG/ML
4 INJECTION INTRAMUSCULAR; INTRAVENOUS EVERY 6 HOURS PRN
OUTPATIENT
Start: 2024-02-19

## 2024-02-19 RX ORDER — SODIUM CHLORIDE 0.9 % (FLUSH) 0.9 %
10 SYRINGE (ML) INJECTION EVERY 12 HOURS SCHEDULED
OUTPATIENT
Start: 2024-02-19

## 2024-02-19 RX ORDER — SODIUM CHLORIDE 9 MG/ML
40 INJECTION, SOLUTION INTRAVENOUS AS NEEDED
OUTPATIENT
Start: 2024-02-19

## 2024-02-19 RX ORDER — SODIUM CHLORIDE 0.9 % (FLUSH) 0.9 %
1-10 SYRINGE (ML) INJECTION AS NEEDED
OUTPATIENT
Start: 2024-02-19

## 2024-02-20 ENCOUNTER — ANTICOAGULATION VISIT (OUTPATIENT)
Dept: PHARMACY | Facility: HOSPITAL | Age: 54
End: 2024-02-20
Payer: MEDICARE

## 2024-02-20 ENCOUNTER — LAB (OUTPATIENT)
Dept: LAB | Facility: HOSPITAL | Age: 54
End: 2024-02-20
Payer: MEDICARE

## 2024-02-20 DIAGNOSIS — I63.9 ACUTE CVA (CEREBROVASCULAR ACCIDENT): ICD-10-CM

## 2024-02-20 DIAGNOSIS — Z79.01 WARFARIN ANTICOAGULATION: ICD-10-CM

## 2024-02-20 DIAGNOSIS — Z79.01 WARFARIN ANTICOAGULATION: Primary | ICD-10-CM

## 2024-02-20 DIAGNOSIS — I63.9 ISCHEMIC STROKE: ICD-10-CM

## 2024-02-20 LAB
INR PPP: 2.4 (ref 0.9–1.1)
PROTHROMBIN TIME: 26.9 SECONDS (ref 12.3–15.1)

## 2024-02-20 PROCEDURE — 36415 COLL VENOUS BLD VENIPUNCTURE: CPT

## 2024-02-20 PROCEDURE — 85610 PROTHROMBIN TIME: CPT

## 2024-02-20 NOTE — PROGRESS NOTES
Anticoagulation Clinic Progress Note  Indication: Atrial Fibrillation, Hx of CVA (09/2023)  Referring Provider: Matt Hernandez (referral received on 1/23/24)  Initial Warfarin Start Date: 09/2023  Planned Duration of Therapy: Lifelong   Goal INR: 2-3  Current Drug Interactions: Hydrocodone/Acetaminophen, Tramadol, Furosemide   [id number]: 4 (CHF, HTN, hx of CVA)  Bleed Risk: 2--No history of bleeding     Diet: Normally avoids (1/31/24)  Alcohol: None   Tobacco: No   OTC Pain Medication: Acetaminophen PRN     Anticoagulation Clinic INR History:  Date 1/31 2/2 2/6 2/9 2/14 2/20             Total Weekly Dose 70 mg 50 mg 10 mg 15 mg 35 mg 35 mg             INR 8.0 5.36   > 8 POCT 1.56 1.9 2.26 2.40             Notes  Hold x2 Hold x6 missx1 zpak                Clinic Interview:  Tablet Strength: Warfarin 10 mg tablets   Estimated OOP cost: [please send to registration if not already done]  Verbal Release Authorization signed on 2/2/24 -- may speak with wife Julienne Bueno  Contact information: [phone]    Patient Findings    Positives: Signs/symptoms of bleeding   Negatives: Signs/symptoms of thrombosis, Laboratory test error suspected, Change in health, Change in alcohol use, Change in activity, Upcoming invasive procedure, Emergency department visit, Upcoming dental procedure, Missed doses, Extra doses, Change in medications, Change in diet/appetite, Hospital admission, Bruising, Other complaints   Comments: Still has blood in urine (CT ordered for 3/13); 2/27 he is having LESLIE/ECV in cardiology.       Plan:    1. INR is therapeutic today at 2.40 (goal INR 2-3). Instructed Mr. Bueno to continue warfarin 5mg oral daily until INR recheck.       Discussed that will continue to work on evaluating maintenance dosing needs. Will recheck on Monday per pt preference because he is having LESLIE/ECV Tuesday.  2. Recheck INR Monday, 2/26 at [facility] via .   3. Matt Bueno does want Rx for new tablet strength 5 mg  but he wants to use up his 10mg tablets first.   4. Verbal and written information provided. Matt Myles expresses understanding by teach back and has no further questions at this time.    Lilly Beasley, [facility]  2/20/2024  13:38 EST

## 2024-02-21 RX ORDER — WARFARIN SODIUM 10 MG/1
TABLET ORAL
Qty: 90 TABLET | Refills: 1 | Status: SHIPPED | OUTPATIENT
Start: 2024-02-21

## 2024-02-26 ENCOUNTER — ANTICOAGULATION VISIT (OUTPATIENT)
Dept: PHARMACY | Facility: HOSPITAL | Age: 54
End: 2024-02-26
Payer: MEDICARE

## 2024-02-26 ENCOUNTER — LAB (OUTPATIENT)
Dept: LAB | Facility: HOSPITAL | Age: 54
End: 2024-02-26
Payer: MEDICARE

## 2024-02-26 DIAGNOSIS — Z79.01 WARFARIN ANTICOAGULATION: ICD-10-CM

## 2024-02-26 DIAGNOSIS — Z79.01 WARFARIN ANTICOAGULATION: Primary | ICD-10-CM

## 2024-02-26 DIAGNOSIS — I63.9 ACUTE CVA (CEREBROVASCULAR ACCIDENT): ICD-10-CM

## 2024-02-26 LAB
INR PPP: 2.22 (ref 0.9–1.1)
PROTHROMBIN TIME: 25.3 SECONDS (ref 12.3–15.1)

## 2024-02-26 PROCEDURE — 36415 COLL VENOUS BLD VENIPUNCTURE: CPT

## 2024-02-26 PROCEDURE — 85610 PROTHROMBIN TIME: CPT

## 2024-02-26 NOTE — PROGRESS NOTES
Anticoagulation Clinic Progress Note  Indication: Atrial Fibrillation, Hx of CVA (09/2023)  Referring Provider: Matt Hernandez (referral received on 1/23/24)  Initial Warfarin Start Date: 09/2023  Planned Duration of Therapy: Lifelong   Goal INR: 2-3  Current Drug Interactions: Hydrocodone/Acetaminophen, Tramadol, Furosemide   [id number]: 4 (CHF, HTN, hx of CVA)  Bleed Risk: 2--No history of bleeding     Diet: Normally avoids (1/31/24)  Alcohol: None   Tobacco: No   OTC Pain Medication: Acetaminophen PRN     Anticoagulation Clinic INR History:  Date 1/31 2/2 2/6 2/9 2/14 2/20 2/26            Total Weekly Dose 70 mg 50 mg 10 mg 15 mg 35 mg 35 mg 30 mg            INR 8.0 5.36   > 8 POCT 1.56 1.9 2.26 2.40 2.22            Notes  Hold x2 Hold x6 missx1 zpak  Miss x1              Clinic Interview:  Tablet Strength: Warfarin 10 mg tablets   Estimated OOP cost: [please send to registration if not already done]  Verbal Release Authorization signed on 2/2/24 -- may speak with wife Julienne Bueno  Contact information: [phone]    Patient Findings  Positives: Missed doses   Negatives: Signs/symptoms of thrombosis, Signs/symptoms of bleeding, Laboratory test error suspected, Change in health, Change in alcohol use, Change in activity, Upcoming invasive procedure, Emergency department visit, Upcoming dental procedure, Extra doses, Change in medications, Change in diet/appetite, Hospital admission, Bruising, Other complaints    Missed Fridays dose         Plan:    1. INR is therapeutic today at 2.22 (goal INR 2-3). Instructed Mr. Bueno to continue warfarin 5mg oral daily until INR recheck.       Discussed that will continue to work on evaluating maintenance dosing needs. Will recheck on Monday per pt preference because he is having LESLIE/ECV Tuesday.  2. Recheck INR in 1 week, if WNL will extend  3. Matt Bueno does want Rx for new tablet strength 5 mg but he wants to use up his 10mg tablets first.   4. Verbal  and written information provided. Matt Bueno expresses understanding by teach back and has no further questions at this time.        Janice cMknight, PharmD.  02/26/24   14:18 EST  '

## 2024-02-27 ENCOUNTER — HOSPITAL ENCOUNTER (OUTPATIENT)
Dept: CARDIOLOGY | Facility: HOSPITAL | Age: 54
Discharge: HOME | End: 2024-02-27
Attending: INTERNAL MEDICINE | Admitting: INTERNAL MEDICINE
Payer: MEDICARE

## 2024-02-27 ENCOUNTER — APPOINTMENT (OUTPATIENT)
Dept: CARDIOLOGY | Facility: HOSPITAL | Age: 54
End: 2024-02-27
Payer: MEDICARE

## 2024-02-27 ENCOUNTER — ANESTHESIA (OUTPATIENT)
Dept: CARDIOLOGY | Facility: HOSPITAL | Age: 54
End: 2024-02-27
Payer: MEDICARE

## 2024-02-27 ENCOUNTER — ANESTHESIA EVENT (OUTPATIENT)
Dept: CARDIOLOGY | Facility: HOSPITAL | Age: 54
End: 2024-02-27
Payer: MEDICARE

## 2024-02-27 VITALS
DIASTOLIC BLOOD PRESSURE: 84 MMHG | WEIGHT: 315 LBS | HEIGHT: 65 IN | HEART RATE: 80 BPM | SYSTOLIC BLOOD PRESSURE: 102 MMHG | OXYGEN SATURATION: 100 % | TEMPERATURE: 97.5 F | RESPIRATION RATE: 10 BRPM | BODY MASS INDEX: 52.48 KG/M2

## 2024-02-27 DIAGNOSIS — I48.19 ATRIAL FIBRILLATION, PERSISTENT: ICD-10-CM

## 2024-02-27 LAB
ANION GAP SERPL CALCULATED.3IONS-SCNC: 8 MMOL/L (ref 5–15)
BH CV ECHO MEAS - AO ROOT DIAM: 2.9 CM
BH CV VAS BP RIGHT ARM: NORMAL MMHG
BUN SERPL-MCNC: 17 MG/DL (ref 6–20)
BUN/CREAT SERPL: 27.4 (ref 7–25)
CALCIUM SPEC-SCNC: 8.5 MG/DL (ref 8.6–10.5)
CHLORIDE SERPL-SCNC: 105 MMOL/L (ref 98–107)
CO2 SERPL-SCNC: 26 MMOL/L (ref 22–29)
CREAT SERPL-MCNC: 0.62 MG/DL (ref 0.76–1.27)
DEPRECATED RDW RBC AUTO: 44.7 FL (ref 37–54)
EGFRCR SERPLBLD CKD-EPI 2021: 112.9 ML/MIN/1.73
ERYTHROCYTE [DISTWIDTH] IN BLOOD BY AUTOMATED COUNT: 14.8 % (ref 12.3–15.4)
GLUCOSE SERPL-MCNC: 147 MG/DL (ref 65–99)
HCT VFR BLD AUTO: 33.1 % (ref 37.5–51)
HGB BLD-MCNC: 10.1 G/DL (ref 13–17.7)
INR PPP: 2.41 (ref 0.89–1.12)
LV EF 2D ECHO EST: 30 %
MCH RBC QN AUTO: 25.1 PG (ref 26.6–33)
MCHC RBC AUTO-ENTMCNC: 30.5 G/DL (ref 31.5–35.7)
MCV RBC AUTO: 82.3 FL (ref 79–97)
PLATELET # BLD AUTO: 258 10*3/MM3 (ref 140–450)
PMV BLD AUTO: 10.6 FL (ref 6–12)
POTASSIUM SERPL-SCNC: 4.1 MMOL/L (ref 3.5–5.2)
PROTHROMBIN TIME: 26.4 SECONDS (ref 12.2–14.5)
RBC # BLD AUTO: 4.02 10*6/MM3 (ref 4.14–5.8)
SODIUM SERPL-SCNC: 139 MMOL/L (ref 136–145)
WBC NRBC COR # BLD AUTO: 7.32 10*3/MM3 (ref 3.4–10.8)

## 2024-02-27 PROCEDURE — 85027 COMPLETE CBC AUTOMATED: CPT | Performed by: INTERNAL MEDICINE

## 2024-02-27 PROCEDURE — C1730 CATH, EP, 19 OR FEW ELECT: HCPCS | Performed by: INTERNAL MEDICINE

## 2024-02-27 PROCEDURE — 85610 PROTHROMBIN TIME: CPT | Performed by: INTERNAL MEDICINE

## 2024-02-27 PROCEDURE — 25010000002 PROPOFOL 10 MG/ML EMULSION

## 2024-02-27 PROCEDURE — 93325 DOPPLER ECHO COLOR FLOW MAPG: CPT | Performed by: INTERNAL MEDICINE

## 2024-02-27 PROCEDURE — 25810000003 LACTATED RINGERS PER 1000 ML

## 2024-02-27 PROCEDURE — 25010000002 CEFAZOLIN PER 500 MG: Performed by: INTERNAL MEDICINE

## 2024-02-27 PROCEDURE — 93312 ECHO TRANSESOPHAGEAL: CPT | Performed by: INTERNAL MEDICINE

## 2024-02-27 PROCEDURE — 80048 BASIC METABOLIC PNL TOTAL CA: CPT | Performed by: INTERNAL MEDICINE

## 2024-02-27 PROCEDURE — 93321 DOPPLER ECHO F-UP/LMTD STD: CPT

## 2024-02-27 PROCEDURE — 93325 DOPPLER ECHO COLOR FLOW MAPG: CPT

## 2024-02-27 PROCEDURE — 92961 CARDIOVERSION ELECTRIC INT: CPT | Performed by: INTERNAL MEDICINE

## 2024-02-27 PROCEDURE — 93312 ECHO TRANSESOPHAGEAL: CPT

## 2024-02-27 PROCEDURE — C1894 INTRO/SHEATH, NON-LASER: HCPCS | Performed by: INTERNAL MEDICINE

## 2024-02-27 PROCEDURE — 93321 DOPPLER ECHO F-UP/LMTD STD: CPT | Performed by: INTERNAL MEDICINE

## 2024-02-27 PROCEDURE — 25010000002 LIDOCAINE 1 % SOLUTION

## 2024-02-27 RX ORDER — SODIUM CHLORIDE 0.9 % (FLUSH) 0.9 %
10 SYRINGE (ML) INJECTION EVERY 12 HOURS SCHEDULED
Status: DISCONTINUED | OUTPATIENT
Start: 2024-02-27 | End: 2024-02-27 | Stop reason: HOSPADM

## 2024-02-27 RX ORDER — ONDANSETRON 2 MG/ML
4 INJECTION INTRAMUSCULAR; INTRAVENOUS ONCE AS NEEDED
Status: DISCONTINUED | OUTPATIENT
Start: 2024-02-27 | End: 2024-02-27 | Stop reason: HOSPADM

## 2024-02-27 RX ORDER — ONDANSETRON 2 MG/ML
4 INJECTION INTRAMUSCULAR; INTRAVENOUS EVERY 6 HOURS PRN
Status: DISCONTINUED | OUTPATIENT
Start: 2024-02-27 | End: 2024-02-27 | Stop reason: HOSPADM

## 2024-02-27 RX ORDER — SODIUM CHLORIDE, SODIUM LACTATE, POTASSIUM CHLORIDE, CALCIUM CHLORIDE 600; 310; 30; 20 MG/100ML; MG/100ML; MG/100ML; MG/100ML
INJECTION, SOLUTION INTRAVENOUS CONTINUOUS PRN
Status: DISCONTINUED | OUTPATIENT
Start: 2024-02-27 | End: 2024-02-27 | Stop reason: SURG

## 2024-02-27 RX ORDER — FENTANYL CITRATE 50 UG/ML
50 INJECTION, SOLUTION INTRAMUSCULAR; INTRAVENOUS
Status: DISCONTINUED | OUTPATIENT
Start: 2024-02-27 | End: 2024-02-27 | Stop reason: HOSPADM

## 2024-02-27 RX ORDER — LIDOCAINE HYDROCHLORIDE 10 MG/ML
INJECTION, SOLUTION INFILTRATION; PERINEURAL AS NEEDED
Status: DISCONTINUED | OUTPATIENT
Start: 2024-02-27 | End: 2024-02-27 | Stop reason: SURG

## 2024-02-27 RX ORDER — SODIUM CHLORIDE 9 MG/ML
40 INJECTION, SOLUTION INTRAVENOUS AS NEEDED
Status: DISCONTINUED | OUTPATIENT
Start: 2024-02-27 | End: 2024-02-27 | Stop reason: HOSPADM

## 2024-02-27 RX ORDER — SPIRONOLACTONE 25 MG/1
25 TABLET ORAL DAILY
Qty: 30 TABLET | Refills: 11 | Status: SHIPPED | OUTPATIENT
Start: 2024-02-27

## 2024-02-27 RX ORDER — SODIUM CHLORIDE 0.9 % (FLUSH) 0.9 %
3 SYRINGE (ML) INJECTION EVERY 12 HOURS SCHEDULED
Status: DISCONTINUED | OUTPATIENT
Start: 2024-02-27 | End: 2024-02-27 | Stop reason: HOSPADM

## 2024-02-27 RX ORDER — SPIRONOLACTONE 25 MG/1
25 TABLET ORAL DAILY
Qty: 30 TABLET | Refills: 11 | Status: SHIPPED | OUTPATIENT
Start: 2024-02-27 | End: 2024-02-27 | Stop reason: SDUPTHER

## 2024-02-27 RX ORDER — IPRATROPIUM BROMIDE AND ALBUTEROL SULFATE 2.5; .5 MG/3ML; MG/3ML
3 SOLUTION RESPIRATORY (INHALATION) ONCE AS NEEDED
Status: DISCONTINUED | OUTPATIENT
Start: 2024-02-27 | End: 2024-02-27 | Stop reason: HOSPADM

## 2024-02-27 RX ORDER — ATORVASTATIN CALCIUM 80 MG/1
80 TABLET, FILM COATED ORAL NIGHTLY
Qty: 90 TABLET | Refills: 0 | Status: SHIPPED | OUTPATIENT
Start: 2024-02-27

## 2024-02-27 RX ORDER — LABETALOL HYDROCHLORIDE 5 MG/ML
5 INJECTION, SOLUTION INTRAVENOUS
Status: DISCONTINUED | OUTPATIENT
Start: 2024-02-27 | End: 2024-02-27 | Stop reason: HOSPADM

## 2024-02-27 RX ORDER — PROPOFOL 10 MG/ML
VIAL (ML) INTRAVENOUS AS NEEDED
Status: DISCONTINUED | OUTPATIENT
Start: 2024-02-27 | End: 2024-02-27 | Stop reason: SURG

## 2024-02-27 RX ORDER — DROPERIDOL 2.5 MG/ML
0.62 INJECTION, SOLUTION INTRAMUSCULAR; INTRAVENOUS ONCE AS NEEDED
Status: DISCONTINUED | OUTPATIENT
Start: 2024-02-27 | End: 2024-02-27 | Stop reason: HOSPADM

## 2024-02-27 RX ORDER — HYDROCODONE BITARTRATE AND ACETAMINOPHEN 5; 325 MG/1; MG/1
1 TABLET ORAL ONCE AS NEEDED
Status: DISCONTINUED | OUTPATIENT
Start: 2024-02-27 | End: 2024-02-27 | Stop reason: HOSPADM

## 2024-02-27 RX ORDER — DROPERIDOL 2.5 MG/ML
0.62 INJECTION, SOLUTION INTRAMUSCULAR; INTRAVENOUS
Status: DISCONTINUED | OUTPATIENT
Start: 2024-02-27 | End: 2024-02-27 | Stop reason: HOSPADM

## 2024-02-27 RX ORDER — HYDRALAZINE HYDROCHLORIDE 20 MG/ML
5 INJECTION INTRAMUSCULAR; INTRAVENOUS
Status: DISCONTINUED | OUTPATIENT
Start: 2024-02-27 | End: 2024-02-27 | Stop reason: HOSPADM

## 2024-02-27 RX ORDER — SODIUM CHLORIDE 0.9 % (FLUSH) 0.9 %
3-10 SYRINGE (ML) INJECTION AS NEEDED
Status: DISCONTINUED | OUTPATIENT
Start: 2024-02-27 | End: 2024-02-27 | Stop reason: HOSPADM

## 2024-02-27 RX ORDER — SODIUM CHLORIDE 0.9 % (FLUSH) 0.9 %
1-10 SYRINGE (ML) INJECTION AS NEEDED
Status: DISCONTINUED | OUTPATIENT
Start: 2024-02-27 | End: 2024-02-27 | Stop reason: HOSPADM

## 2024-02-27 RX ORDER — HYDROMORPHONE HYDROCHLORIDE 1 MG/ML
0.5 INJECTION, SOLUTION INTRAMUSCULAR; INTRAVENOUS; SUBCUTANEOUS
Status: DISCONTINUED | OUTPATIENT
Start: 2024-02-27 | End: 2024-02-27 | Stop reason: HOSPADM

## 2024-02-27 RX ADMIN — PROPOFOL 50 MG: 10 INJECTION, EMULSION INTRAVENOUS at 14:20

## 2024-02-27 RX ADMIN — SODIUM CHLORIDE, POTASSIUM CHLORIDE, SODIUM LACTATE AND CALCIUM CHLORIDE: 600; 310; 30; 20 INJECTION, SOLUTION INTRAVENOUS at 14:08

## 2024-02-27 RX ADMIN — PROPOFOL 140 MCG/KG/MIN: 10 INJECTION, EMULSION INTRAVENOUS at 14:20

## 2024-02-27 RX ADMIN — PROPOFOL 50 MG: 10 INJECTION, EMULSION INTRAVENOUS at 14:22

## 2024-02-27 RX ADMIN — LIDOCAINE HYDROCHLORIDE 100 MG: 10 INJECTION, SOLUTION INFILTRATION; PERINEURAL at 14:20

## 2024-02-27 NOTE — Clinical Note
Prepped: left neck. Prepped with: ChloraPrep. The site was clipped. The patient was draped in a sterile fashion.

## 2024-02-27 NOTE — ANESTHESIA PREPROCEDURE EVALUATION
Anesthesia Evaluation     Patient summary reviewed and Nursing notes reviewed   NPO Solid Status: > 8 hours  NPO Liquid Status: > 2 hours           Airway   Mallampati: II  TM distance: >3 FB  Neck ROM: full  No difficulty expected  Dental    (+) poor dentition        Pulmonary    (+) asthma (MDI PRN),shortness of breath, sleep apnea on CPAP  (-) recent URI, not a smoker, no home oxygen    ROS comment: Sats 97-99%RA   Cardiovascular     ECG reviewed  PT is on anticoagulation therapy    (+) pacemaker (AICD) ICD, hypertension, CHF , hyperlipidemia    ROS comment: ECG A Fib     ECHO EF >36 %. LV moderately dilated.LVDD  (grade II w/high LAP) pseudonormalization.  LA severely dilated.  LA volume is mildly increased.  RVSP normal (<35 mmHg).    CATH 2018 EF 18% Normal CA       Neuro/Psych  (+) CVA, numbness  (-) seizures  GI/Hepatic/Renal/Endo    (+) obesity, morbid obesity, GERD  (-) no renal disease, diabetes, no thyroid disorder    ROS Comment: Sp G sleeve     Musculoskeletal     (+) back pain, neck pain  Abdominal    Substance History      OB/GYN          Other        ROS/Med Hx Other: Labs WNL HCT 33       Phys Exam Other: Several broken and careous lower teeth                   Anesthesia Plan    ASA 4     general and MAC     (Not sure wether GA ETT required vs LMAC   This is internal defib not external   Has AICD )  intravenous induction     Anesthetic plan, risks, benefits, and alternatives have been provided, discussed and informed consent has been obtained with: patient.    Plan discussed with CRNA.        CODE STATUS:

## 2024-02-27 NOTE — INTERVAL H&P NOTE
After being seen in the office patient's case was discussed with Dr. Hernandez as well as electrophysiology.  Given his severe morbid obesity,low likelihood of external cardioversion being successful, and risk of respiratory complications with multiple sedations it was deemed more beneficial to proceed with LESLIE with sedation via general anesthesia as well as internal cardioversion.  This was discussed with the patient.  He verbalizes understanding and wishes to proceed.  Notes since being seen in the office he still has some persistent cough.  Had some improvement of lower extremity edema with diuretics.  However, notes that he missed a few days of his medications and since then his edema has returned.    Noy Argueta, ALFREDA      Render Risk Assessment In Note?: no Additional Notes: Discussed with pt that biologic would help with nails thickening and arthritis. Pt can discuss biologic with rheumatologist. Pt should RTC if any psoriatic plaques appear on the skin. Will prescribe bryhali to help with nails. Detail Level: Simple

## 2024-02-27 NOTE — ANESTHESIA POSTPROCEDURE EVALUATION
Patient: Matt Bueno    Procedure Summary       Date: 02/27/24 Room / Location: [facility] / [facility]IVE [facility]    Anesthesia Start: 1408 Anesthesia Stop: 1434    Procedure: Cardioversion/Defibrillation Diagnosis:     Providers: Guanaco Moyer MD Provider: Duane Thompson MD    Anesthesia Type: general, MAC ASA Status: 4            Anesthesia Type: general, MAC    Vitals  Vitals Value Taken Time   /65 02/27/24 1434   Temp 97 °F (36.1 °C) 02/27/24 1434   Pulse     Resp 16 02/27/24 1434   SpO2 100 % 02/27/24 1434           Post Anesthesia Care and Evaluation    Patient location during evaluation: PACU  Patient participation: complete - patient participated  Level of consciousness: awake and alert  Pain management: adequate    Airway patency: patent  Anesthetic complications: No anesthetic complications  PONV Status: none  Cardiovascular status: hemodynamically stable and acceptable  Respiratory status: nonlabored ventilation, acceptable and nasal cannula  Hydration status: acceptable

## 2024-03-06 ENCOUNTER — ANTICOAGULATION VISIT (OUTPATIENT)
Dept: PHARMACY | Facility: HOSPITAL | Age: 54
End: 2024-03-06
Payer: MEDICARE

## 2024-03-06 ENCOUNTER — LAB (OUTPATIENT)
Dept: LAB | Facility: HOSPITAL | Age: 54
End: 2024-03-06
Payer: MEDICARE

## 2024-03-06 DIAGNOSIS — Z79.01 WARFARIN ANTICOAGULATION: ICD-10-CM

## 2024-03-06 DIAGNOSIS — Z79.01 WARFARIN ANTICOAGULATION: Primary | ICD-10-CM

## 2024-03-06 DIAGNOSIS — I63.9 ACUTE CVA (CEREBROVASCULAR ACCIDENT): ICD-10-CM

## 2024-03-06 LAB
INR PPP: 2.55 (ref 0.9–1.1)
PROTHROMBIN TIME: 28.2 SECONDS (ref 12.3–15.1)

## 2024-03-06 PROCEDURE — 85610 PROTHROMBIN TIME: CPT

## 2024-03-06 PROCEDURE — 36415 COLL VENOUS BLD VENIPUNCTURE: CPT

## 2024-03-06 NOTE — PROGRESS NOTES
Anticoagulation Clinic Progress Note  Indication: Atrial Fibrillation, Hx of CVA (09/2023)  Referring Provider: Matt Hernandez (referral received on 1/23/24)  Initial Warfarin Start Date: 09/2023  Planned Duration of Therapy: Lifelong   Goal INR: 2-3  Current Drug Interactions: Hydrocodone/Acetaminophen, Tramadol, Furosemide   [id number]: 4 (CHF, HTN, hx of CVA)  Bleed Risk: 2--No history of bleeding     Diet: Normally avoids (1/31/24)  Alcohol: None   Tobacco: No   OTC Pain Medication: Acetaminophen PRN     Anticoagulation Clinic INR History:  Date 1/31 2/2 2/6 2/9  2/14 2/20 2/26 3/6           Total Weekly Dose 70 mg 50 mg 10 mg 15 mg 35 mg 35 mg 30 mg 35 mg            INR 8.0 5.36   > 8 POCT 1.56 1.9 2.26 2.40 2.22 2.55           Notes  Hold x2 Hold x6 missx1 zpak  Miss x1 Unable to contact until 3/11             Clinic Interview:  Tablet Strength: Warfarin 10 mg tablets   Estimated OOP cost: [please send to registration if not already done]  Verbal Release Authorization signed on 2/2/24 -- may speak with wife Julienne Bueno  Contact information: [phone]        Patient Findings    Negatives: Signs/symptoms of thrombosis, Signs/symptoms of bleeding, Laboratory test error suspected, Change in health, Change in alcohol use, Change in activity, Upcoming invasive procedure, Emergency department visit, Upcoming dental procedure, Missed doses, Extra doses, Change in medications, Change in diet/appetite, Hospital admission, Bruising, Other complaints   Comments: Julienne stated that patient had a cardioversion on 2/27 w/ Dr. Hernandez without issue. He denies all the other above listed findings.       Plan:    1. INR is therapeutic on 3/6 at 2.55, unable to reach until 3/11 (goal INR 2-3). Instructed Julienne to have Mr. Bueno continue warfarin 5mg oral daily until recheck  2. Recheck INR in 2 weeks, 3/20.  3. Matt Bueno does want Rx for new tablet strength 5 mg but he wants to use up his 10mg tablets first.    4. Verbal and written information provided. Matt Bueno expresses understanding by teach back and has no further questions at this time.    Mis Jernigan, DarciD  3/6/2024   14:58 EST

## 2024-03-13 ENCOUNTER — HOSPITAL ENCOUNTER (OUTPATIENT)
Dept: CT IMAGING | Facility: HOSPITAL | Age: 54
Discharge: HOME | End: 2024-03-13
Payer: MEDICARE

## 2024-03-13 DIAGNOSIS — R31.0 GROSS HEMATURIA: ICD-10-CM

## 2024-03-13 DIAGNOSIS — R42 DIZZINESS: ICD-10-CM

## 2024-03-13 DIAGNOSIS — R51.9 INCREASED FREQUENCY OF HEADACHES: ICD-10-CM

## 2024-03-13 DIAGNOSIS — H57.13 EYE PAIN, BILATERAL: ICD-10-CM

## 2024-03-13 PROCEDURE — 25510000001 IOPAMIDOL 61 % SOLUTION: Performed by: PHYSICIAN ASSISTANT

## 2024-03-13 PROCEDURE — 70450 CT HEAD/BRAIN W/O DYE: CPT

## 2024-03-13 PROCEDURE — 74178 CT ABD&PLV WO CNTR FLWD CNTR: CPT

## 2024-03-13 RX ADMIN — IOPAMIDOL 100 ML: 612 INJECTION, SOLUTION INTRAVENOUS at 15:10

## 2024-03-25 ENCOUNTER — TELEPHONE (OUTPATIENT)
Dept: FAMILY MEDICINE CLINIC | Facility: CLINIC | Age: 54
End: 2024-03-25
Payer: MEDICARE

## 2024-03-25 RX ORDER — CARVEDILOL 12.5 MG/1
12.5 TABLET ORAL 2 TIMES DAILY WITH MEALS
Qty: 180 TABLET | Refills: 3 | Status: SHIPPED | OUTPATIENT
Start: 2024-03-25

## 2024-03-25 NOTE — TELEPHONE ENCOUNTER
Caller: Matt Bueno    Relationship: Self    Best call back number: [phone]     Caller requesting test results: MATT BUENO     What test was performed: CT OF PELVIS     When was the test performed: 3/13/24     Where was the test performed: [facility]     Additional notes: PLEASE CALL BACK TO DISCUSS RESULTS.

## 2024-03-26 ENCOUNTER — LAB (OUTPATIENT)
Dept: LAB | Facility: HOSPITAL | Age: 54
End: 2024-03-26
Payer: MEDICARE

## 2024-03-26 ENCOUNTER — ANTICOAGULATION VISIT (OUTPATIENT)
Dept: PHARMACY | Facility: HOSPITAL | Age: 54
End: 2024-03-26
Payer: MEDICARE

## 2024-03-26 DIAGNOSIS — I63.9 ACUTE CVA (CEREBROVASCULAR ACCIDENT): ICD-10-CM

## 2024-03-26 DIAGNOSIS — Z79.01 WARFARIN ANTICOAGULATION: Primary | ICD-10-CM

## 2024-03-26 DIAGNOSIS — Z79.01 WARFARIN ANTICOAGULATION: ICD-10-CM

## 2024-03-26 LAB
INR PPP: 2.98 (ref 0.9–1.1)
PROTHROMBIN TIME: 31.8 SECONDS (ref 12.3–15.1)

## 2024-03-26 PROCEDURE — 36415 COLL VENOUS BLD VENIPUNCTURE: CPT

## 2024-03-26 PROCEDURE — 85610 PROTHROMBIN TIME: CPT

## 2024-03-26 RX ORDER — WARFARIN SODIUM 5 MG/1
TABLET ORAL
Qty: 30 TABLET | Refills: 1 | Status: SHIPPED | OUTPATIENT
Start: 2024-03-26

## 2024-03-26 NOTE — PROGRESS NOTES
Anticoagulation Clinic Progress Note  Indication: Atrial Fibrillation, Hx of CVA (09/2023)  Referring Provider: Matt Hernandez (referral received on 1/23/24)  Initial Warfarin Start Date: 09/2023  Planned Duration of Therapy: Lifelong   Goal INR: 2-3  Current Drug Interactions: Hydrocodone/Acetaminophen, Tramadol, Furosemide   [id number]: 4 (CHF, HTN, hx of CVA)  Bleed Risk: 2--No history of bleeding     Diet: Normally avoids (1/31/24)  Alcohol: None   Tobacco: No   OTC Pain Medication: Acetaminophen PRN     Anticoagulation Clinic INR History:  Date 1/31 2/2 2/6 2/9  2/14 2/20 2/26 3/6 3/26          Total Weekly Dose 70 mg 50 mg 10 mg 15 mg 35 mg 35 mg 30 mg 35 mg  35 mg          INR 8.0 5.36   > 8 POCT 1.56 1.9 2.26 2.40 2.22 2.55 2.98          Notes  Hold x2 Hold x6 missx1 zpak  Miss x1 Unable to reach until 3/11             Clinic Interview:  Tablet Strength: Warfarin 10 mg tablets , 5 mg tablet 3/26/24  Estimated OOP cost: [please send to registration if not already done]  Verbal Release Authorization signed on 2/2/24 -- may speak with wife Julienne Bueno  Contact information: [phone] (mobile)    Patient Findings  Positives: Change in medications   Negatives: Signs/symptoms of thrombosis, Signs/symptoms of bleeding, Laboratory test error suspected, Change in health, Change in alcohol use, Change in activity, Upcoming invasive procedure, Emergency department visit, Upcoming dental procedure, Missed doses, Extra doses, Change in diet/appetite, Hospital admission, Bruising, Other complaints   Comments: Mr. Bueno stated that he had stopped taking the spironolactone because it made him sick.  He is planning on picking up samples of a new medication at Dr. Hernandez's offices and will contact the clinic with medication information.  Otherwise, above findings negative     Plan:    1. INR is therapeutic today at 2.98 (goal INR 2-3). Instructed Mr. Bueno to continue warfarin 5mg oral daily until  recheck.  2. Recheck INR in 2 weeks, 4/9  3. Sent Rx for warfarin 5 mg tablets; he will plan to  after the first of the month.  He does not have prescription insurance   4. Verbal information provided over the phone. Matt Bueno expresses understanding by teach back and has no further questions at this time.    Ewa Treviño, PharmD  3/26/2024   10:39 EDT

## 2024-04-03 RX ORDER — WARFARIN SODIUM 5 MG/1
TABLET ORAL
Qty: 30 TABLET | Refills: 1 | Status: SHIPPED | OUTPATIENT
Start: 2024-04-03

## 2024-04-03 NOTE — TELEPHONE ENCOUNTER
Patient called because his warfarin refill was sent to the wrong pharmacy last time. He needs the Warfarin refill sent to [facility] in [place].    Haris Adam, Pharmacy Technician  4/3/2024  15:20 EDT

## 2024-04-22 RX ORDER — ATORVASTATIN CALCIUM 80 MG/1
80 TABLET, FILM COATED ORAL NIGHTLY
Qty: 90 TABLET | Refills: 0 | Status: SHIPPED | OUTPATIENT
Start: 2024-04-22

## 2024-04-26 ENCOUNTER — LAB (OUTPATIENT)
Dept: LAB | Facility: HOSPITAL | Age: 54
End: 2024-04-26
Payer: MEDICARE

## 2024-04-26 DIAGNOSIS — Z79.01 WARFARIN ANTICOAGULATION: ICD-10-CM

## 2024-04-26 DIAGNOSIS — I63.9 ACUTE CVA (CEREBROVASCULAR ACCIDENT): ICD-10-CM

## 2024-04-26 PROCEDURE — 36415 COLL VENOUS BLD VENIPUNCTURE: CPT

## 2024-04-29 ENCOUNTER — ANTICOAGULATION VISIT (OUTPATIENT)
Dept: PHARMACY | Facility: HOSPITAL | Age: 54
End: 2024-04-29
Payer: MEDICARE

## 2024-04-29 ENCOUNTER — OFFICE VISIT (OUTPATIENT)
Dept: CARDIOLOGY | Facility: CLINIC | Age: 54
End: 2024-04-29
Payer: MEDICARE

## 2024-04-29 VITALS
HEIGHT: 65 IN | DIASTOLIC BLOOD PRESSURE: 82 MMHG | SYSTOLIC BLOOD PRESSURE: 132 MMHG | WEIGHT: 315 LBS | OXYGEN SATURATION: 97 % | BODY MASS INDEX: 52.48 KG/M2 | HEART RATE: 88 BPM

## 2024-04-29 DIAGNOSIS — Z79.01 WARFARIN ANTICOAGULATION: Primary | ICD-10-CM

## 2024-04-29 DIAGNOSIS — I10 ESSENTIAL HYPERTENSION: ICD-10-CM

## 2024-04-29 DIAGNOSIS — I50.22 CHRONIC HFREF (HEART FAILURE WITH REDUCED EJECTION FRACTION): ICD-10-CM

## 2024-04-29 DIAGNOSIS — I48.19 ATRIAL FIBRILLATION, PERSISTENT: Primary | ICD-10-CM

## 2024-04-29 LAB
INR PPP: 3.1 (ref 0.91–1.09)
PROTHROMBIN TIME: 36.8 SECONDS (ref 10–13.8)

## 2024-04-29 PROCEDURE — 99214 OFFICE O/P EST MOD 30 MIN: CPT | Performed by: NURSE PRACTITIONER

## 2024-04-29 PROCEDURE — G0463 HOSPITAL OUTPT CLINIC VISIT: HCPCS

## 2024-04-29 PROCEDURE — 1160F RVW MEDS BY RX/DR IN RCRD: CPT | Performed by: NURSE PRACTITIONER

## 2024-04-29 PROCEDURE — 93000 ELECTROCARDIOGRAM COMPLETE: CPT | Performed by: NURSE PRACTITIONER

## 2024-04-29 PROCEDURE — 1159F MED LIST DOCD IN RCRD: CPT | Performed by: NURSE PRACTITIONER

## 2024-04-29 PROCEDURE — 93282 PRGRMG EVAL IMPLANTABLE DFB: CPT | Performed by: NURSE PRACTITIONER

## 2024-04-29 PROCEDURE — 85610 PROTHROMBIN TIME: CPT

## 2024-04-29 PROCEDURE — 36416 COLLJ CAPILLARY BLOOD SPEC: CPT

## 2024-04-29 NOTE — PROGRESS NOTES
Anticoagulation Clinic Progress Note  Indication: Atrial Fibrillation, Hx of CVA (09/2023)  Referring Provider: Matt Hernandez (referral received on 1/23/24)  Initial Warfarin Start Date: 09/2023  Planned Duration of Therapy: Lifelong   Goal INR: 2-3  Current Drug Interactions: Hydrocodone/Acetaminophen, Tramadol, Furosemide   [id number]: 4 (CHF, HTN, hx of CVA)  Bleed Risk: 2--No history of bleeding     Diet: Normally avoids (1/31/24)  Alcohol: None   Tobacco: No   OTC Pain Medication: Acetaminophen PRN     Anticoagulation Clinic INR History:  Date 1/31 2/2 2/6 2/9  2/14 2/20 2/26 3/6 3/26 4/29         Total Weekly Dose 70 mg 50 mg 10 mg 15 mg 35 mg 35 mg 30 mg 35 mg  35 mg 35 mg         INR 8.0 5.36   > 8 POCT 1.56 1.9 2.26 2.40 2.22 2.55 2.98 3.1         Notes  Hold x2 Hold x6 missx1 zpak  Miss x1 Unable to reach until 3/11             Clinic Interview:  Tablet Strength: Warfarin 10 mg tablets , 5 mg tablet 3/26/24  Estimated OOP cost: [please send to registration if not already done]  Verbal Release Authorization signed on 2/2/24 -- may speak with wife Julienne Bueno  Contact information: [phone] (mobile)    Patient Findings    Negatives: Signs/symptoms of thrombosis, Signs/symptoms of bleeding, Laboratory test error suspected, Change in health, Change in alcohol use, Change in activity, Upcoming invasive procedure, Emergency department visit, Upcoming dental procedure, Missed doses, Extra doses, Change in medications, Change in diet/appetite, Hospital admission, Bruising, Other complaints   Comments: Mr. Bueno is getting farxiga samples from Dr. Hernandez's office hopefull so he can start them. He stopped spironolactone because it made him sick. Has gone off atorvastatin but is getting it refilled. Had a small bruise on his left arm but it healed. He is following up with urology for previous blood in the urine. They found a cyst and lesion.       Plan:  1. INR is slightly supratherapeutic today at  3.1 (goal INR 2-3). Instructed Mr. Bueno to reduce tonights dose to 2.5 mg then continue warfarin 5mg oral daily until recheck.(He tried to get INR last Friday but the lab could not obtain a good enough sample)  2. Recheck INR in 1 week, 5/6 at [facility] lab  3. Sent Rx for warfarin 5 mg tablets; he will plan to  after the first of the month.  He does not have prescription insurance (using good rx)  4. Verbal information provided over the phone. Matt Bueno expresses understanding by teach back and has no further questions at this time.    Lilly Beasley, [facility]  4/29/2024   13:24 EDT

## 2024-04-29 NOTE — PROGRESS NOTES
Subjective:     Encounter Date:04/29/2024    Primary Care Physician: Stas Rust MD      Patient ID: Matt Bueno is a 53 y.o. male.    Chief Complaint:Acute on chronic HFrEF (heart failure with reduced ejection      PROBLEM LIST:  Nonischemic cardiomyopathy:  Echocardiogram, 10/31/2018: EF 25%. Mild MR. RVSP 57 mmHg.  C, 11/16/2018: Normal coronary arteries. Dilated cardiomyopathy with EF 15%. Significantly elevated LVEDP.  Echocardiogram, 02/20/2019: EF 25%. Mild-to-moderate MR.  ICD placement, 04/01/2019, Dr. Day: [employer] VVI ICD, model Dynagen EL ICD VR, serial #[id number].  Echocardiogram, 02/03/20: EF 30%. Mild MR.  Echo, 09/29/2023: EF 36-40%. Left ventricular diastolic function is consistent with (grade II w/high LAP) pseudonormalization. The left atrial cavity is moderate to severely dilated. Left atrial volume is mildly increased. Negative saline test. Normal RVSP.  Carotid duplex, 09/29/2023: Mild plaque within the left carotid bulb causing 50% stenosis or less. No plaque identified in the right carotid bulb, no stenosis seen.  Morbid obesity:  Peak weight 500 lbs.  CVA  9/23/2023, left MCA territory.  Presumed thromboembolic, placed on warfarin  Paroxysmal atrial fibrillation   Asymptomatic, noted on device check.  sleep apnea on CPAP  Incomplete LBBB  Surgeries:  Gastric sleeve  Left knee arthroscopy  Bilateral shoulder surgery  [place] teeth extracted      No Known Allergies      Current Outpatient Medications:     atorvastatin (LIPITOR) 80 MG tablet, Take 1 tablet by mouth Every Night., Disp: 90 tablet, Rfl: 0    carvedilol (COREG) 12.5 MG tablet, Take 1 tablet by mouth 2 (Two) Times a Day With Meals., Disp: 180 tablet, Rfl: 3    dapagliflozin (FARXIGA) 5 MG tablet tablet, Take 1 tablet by mouth Daily., Disp: 30 tablet, Rfl: 6    furosemide (LASIX) 40 MG tablet, Take 1 tablet by mouth Daily., Disp: 30 tablet, Rfl: 0    HYDROcodone-acetaminophen (NORCO)  MG per tablet, Take  "1 tablet by mouth Every 6 (Six) Hours., Disp: , Rfl:     ipratropium-albuterol (DUO-NEB) 0.5-2.5 mg/3 ml nebulizer, Take 3 mL by nebulization Every 4 (Four) Hours As Needed for Wheezing., Disp: 120 mL, Rfl: 0    Iron, Ferrous Sulfate, 325 (65 Fe) MG tablet, Take 1 tablet by mouth Daily., Disp: 30 tablet, Rfl: 2    omeprazole (priLOSEC) 20 MG capsule, Take 1 capsule by mouth Daily., Disp: , Rfl:     sacubitril-valsartan (ENTRESTO)  MG tablet, Take 1 tablet by mouth 2 (Two) Times a Day. Hold if SBP < 100, Disp: 180 tablet, Rfl: 3    spironolactone (ALDACTONE) 25 MG tablet, Take 1 tablet by mouth Daily., Disp: 30 tablet, Rfl: 11    traMADol (ULTRAM) 50 MG tablet, Take 1 tablet by mouth Every 12 (Twelve) Hours., Disp: , Rfl:     warfarin (Coumadin) 5 MG tablet, Take one tablet by mouth daily OR as directed by anticoagulation clinic, Disp: 30 tablet, Rfl: 1        History of Present Illness    Patient is a 53-year-old  male who presents today for follow-up status post internal cardioversion and LESLIE.  Since last being seen patient notes overall to be at his baseline.  At times has some increased shortness of breath that seems to improve with use of diuretics.  Denies any chest pain, pressure, tightness.  No reported syncope, presyncope.  Has intermittent edema.  Eliquis was too expensive and he is back on warfarin.    The following portions of the patient's history were reviewed and updated as appropriate: allergies, current medications, past family history, past medical history, past social history, past surgical history and problem list.      Social History     Tobacco Use    Smoking status: Never     Passive exposure: Past    Smokeless tobacco: Never   Vaping Use    Vaping status: Never Used   Substance Use Topics    Alcohol use: Yes     Comment: VERY rarely    Drug use: No         ROS       Objective:   /82   Pulse 88   Ht 165.1 cm (65\")   Wt (!) 175 kg (385 lb 6.4 oz)   SpO2 97%   BMI 64.13 " kg/m²         Vitals reviewed.   Constitutional:       Appearance: Well-developed and not in distress.   Neck:      Vascular: No JVD.      Trachea: No tracheal deviation.   Pulmonary:      Effort: Pulmonary effort is normal.      Breath sounds: Normal breath sounds.   Cardiovascular:      Normal rate. Irregularly irregular rhythm.      Murmurs: There is no murmur.   Edema:     Peripheral edema absent.   Musculoskeletal:         General: No deformity. Skin:     General: Skin is warm and dry.   Neurological:      Mental Status: Alert and oriented to person, place, and time.           ECG 12 Lead    Date/Time: 4/29/2024 3:06 PM  Performed by: Noy Argueta APRN    Authorized by: Noy Argueta APRN  Comparison: compared with previous ECG from 2/12/2024  Similar to previous ECG  Rhythm: atrial fibrillation      Device check:  Normal functioning single-chamber ICD.  Noted ventricular high rate episodes longest of 28 seconds.  Noted all on same day.          Assessment:   Assessment & Plan      Diagnoses and all orders for this visit:    1. Atrial fibrillation, persistent (Primary), status post internal cardioversion.  Now with recurrent atrial fibrillation.  Rate controlled.  Overall relatively asymptomatic.    2. Essential hypertension, mildly elevated today.  Patient does not follow blood pressure at home.  At times has been marginal.  On beta-blocker.    3. Chronic HFrEF (heart failure with reduced ejection fraction), stable.  On diuretics.  Also on Entresto.  EF mildly depressed from previous on recent LESLIE.    Other orders  -     ECG 12 Lead      Plan:  Discussed with patient at this time that despite brief return of sinus rhythm at this time we will likely defer any further attempts to restore sinus rhythm as he is currently rate controlled and asymptomatic.  Continue medical therapy and concluding anticoagulation and rate control.  I have asked patient to monitor his blood pressure at home and call with  results in a couple of weeks.  At that time we will consider uptitrating his carvedilol to 25 mg p.o. twice daily.  Continue other current medications as prescribed.  Follow-up in 6 months time or sooner if needed with a device check.       Noy GANT           Dictated utilizing Dragon dictation

## 2024-04-29 NOTE — LETTER
April 29, 2024       No Recipients    Patient: Matt Bueno   YOB: 1970   Date of Visit: 4/29/2024     Dear Stas Rust MD:       Thank you for referring Matt Bueno to me for evaluation. Below are the relevant portions of my assessment and plan of care.    If you have questions, please do not hesitate to call me. I look forward to following Matt along with you.         Sincerely,        ALFREDA Sandoval        CC:   No Recipients    Noy Argueta APRN  04/29/24 1509  Sign when Signing Visit  Subjective:     Encounter Date:04/29/2024    Primary Care Physician: Stas Rust MD      Patient ID: Matt Bueno is a 53 y.o. male.    Chief Complaint:Acute on chronic HFrEF (heart failure with reduced ejection      PROBLEM LIST:  Nonischemic cardiomyopathy:  Echocardiogram, 10/31/2018: EF 25%. Mild MR. RVSP 57 mmHg.  C, 11/16/2018: Normal coronary arteries. Dilated cardiomyopathy with EF 15%. Significantly elevated LVEDP.  Echocardiogram, 02/20/2019: EF 25%. Mild-to-moderate MR.  ICD placement, 04/01/2019, Dr. Day: [employer] VVI ICD, model Dynagen EL ICD VR, serial #[id number].  Echocardiogram, 02/03/20: EF 30%. Mild MR.  Echo, 09/29/2023: EF 36-40%. Left ventricular diastolic function is consistent with (grade II w/high LAP) pseudonormalization. The left atrial cavity is moderate to severely dilated. Left atrial volume is mildly increased. Negative saline test. Normal RVSP.  Carotid duplex, 09/29/2023: Mild plaque within the left carotid bulb causing 50% stenosis or less. No plaque identified in the right carotid bulb, no stenosis seen.  Morbid obesity:  Peak weight 500 lbs.  CVA  9/23/2023, left MCA territory.  Presumed thromboembolic, placed on warfarin  Paroxysmal atrial fibrillation   Asymptomatic, noted on device check.  sleep apnea on CPAP  Incomplete LBBB  Surgeries:  Gastric sleeve  Left knee arthroscopy  Bilateral shoulder surgery  [place] teeth  extracted      No Known Allergies      Current Outpatient Medications:   •  atorvastatin (LIPITOR) 80 MG tablet, Take 1 tablet by mouth Every Night., Disp: 90 tablet, Rfl: 0  •  carvedilol (COREG) 12.5 MG tablet, Take 1 tablet by mouth 2 (Two) Times a Day With Meals., Disp: 180 tablet, Rfl: 3  •  dapagliflozin (FARXIGA) 5 MG tablet tablet, Take 1 tablet by mouth Daily., Disp: 30 tablet, Rfl: 6  •  furosemide (LASIX) 40 MG tablet, Take 1 tablet by mouth Daily., Disp: 30 tablet, Rfl: 0  •  HYDROcodone-acetaminophen (NORCO)  MG per tablet, Take 1 tablet by mouth Every 6 (Six) Hours., Disp: , Rfl:   •  ipratropium-albuterol (DUO-NEB) 0.5-2.5 mg/3 ml nebulizer, Take 3 mL by nebulization Every 4 (Four) Hours As Needed for Wheezing., Disp: 120 mL, Rfl: 0  •  Iron, Ferrous Sulfate, 325 (65 Fe) MG tablet, Take 1 tablet by mouth Daily., Disp: 30 tablet, Rfl: 2  •  omeprazole (priLOSEC) 20 MG capsule, Take 1 capsule by mouth Daily., Disp: , Rfl:   •  sacubitril-valsartan (ENTRESTO)  MG tablet, Take 1 tablet by mouth 2 (Two) Times a Day. Hold if SBP < 100, Disp: 180 tablet, Rfl: 3  •  spironolactone (ALDACTONE) 25 MG tablet, Take 1 tablet by mouth Daily., Disp: 30 tablet, Rfl: 11  •  traMADol (ULTRAM) 50 MG tablet, Take 1 tablet by mouth Every 12 (Twelve) Hours., Disp: , Rfl:   •  warfarin (Coumadin) 5 MG tablet, Take one tablet by mouth daily OR as directed by anticoagulation clinic, Disp: 30 tablet, Rfl: 1        History of Present Illness    Patient is a 53-year-old  male who presents today for follow-up status post internal cardioversion and LESLIE.  Since last being seen patient notes overall to be at his baseline.  At times has some increased shortness of breath that seems to improve with use of diuretics.  Denies any chest pain, pressure, tightness.  No reported syncope, presyncope.  Has intermittent edema.  Eliquis was too expensive and he is back on warfarin.    The following portions of the patient's  "history were reviewed and updated as appropriate: allergies, current medications, past family history, past medical history, past social history, past surgical history and problem list.      Social History     Tobacco Use   • Smoking status: Never     Passive exposure: Past   • Smokeless tobacco: Never   Vaping Use   • Vaping status: Never Used   Substance Use Topics   • Alcohol use: Yes     Comment: VERY rarely   • Drug use: No         ROS       Objective:   /82   Pulse 88   Ht 165.1 cm (65\")   Wt (!) 175 kg (385 lb 6.4 oz)   SpO2 97%   BMI 64.13 kg/m²         Vitals reviewed.   Constitutional:       Appearance: Well-developed and not in distress.   Neck:      Vascular: No JVD.      Trachea: No tracheal deviation.   Pulmonary:      Effort: Pulmonary effort is normal.      Breath sounds: Normal breath sounds.   Cardiovascular:      Normal rate. Irregularly irregular rhythm.      Murmurs: There is no murmur.   Edema:     Peripheral edema absent.   Musculoskeletal:         General: No deformity. Skin:     General: Skin is warm and dry.   Neurological:      Mental Status: Alert and oriented to person, place, and time.           ECG 12 Lead    Date/Time: 4/29/2024 3:06 PM  Performed by: Noy Argueta APRN    Authorized by: Noy Argueta APRN  Comparison: compared with previous ECG from 2/12/2024  Similar to previous ECG  Rhythm: atrial fibrillation      Device check:  Normal functioning single-chamber ICD.  Noted ventricular high rate episodes longest of 28 seconds.  Noted all on same day.          Assessment:   Assessment & Plan     Diagnoses and all orders for this visit:    1. Atrial fibrillation, persistent (Primary), status post internal cardioversion.  Now with recurrent atrial fibrillation.  Rate controlled.  Overall relatively asymptomatic.    2. Essential hypertension, mildly elevated today.  Patient does not follow blood pressure at home.  At times has been marginal.  On " beta-blocker.    3. Chronic HFrEF (heart failure with reduced ejection fraction), stable.  On diuretics.  Also on Entresto.  EF mildly depressed from previous on recent LESLIE.    Other orders  -     ECG 12 Lead      Plan:  Discussed with patient at this time that despite brief return of sinus rhythm at this time we will likely defer any further attempts to restore sinus rhythm as he is currently rate controlled and asymptomatic.  Continue medical therapy and concluding anticoagulation and rate control.  I have asked patient to monitor his blood pressure at home and call with results in a couple of weeks.  At that time we will consider uptitrating his carvedilol to 25 mg p.o. twice daily.  Continue other current medications as prescribed.  Follow-up in 6 months time or sooner if needed with a device check.       Noy GANT           Dictated utilizing Dragon dictation

## 2024-05-08 ENCOUNTER — OFFICE VISIT (OUTPATIENT)
Dept: UROLOGY | Facility: CLINIC | Age: 54
End: 2024-05-08
Payer: MEDICARE

## 2024-05-08 ENCOUNTER — LAB (OUTPATIENT)
Dept: LAB | Facility: HOSPITAL | Age: 54
End: 2024-05-08
Payer: MEDICARE

## 2024-05-08 VITALS
WEIGHT: 315 LBS | BODY MASS INDEX: 52.48 KG/M2 | HEART RATE: 99 BPM | SYSTOLIC BLOOD PRESSURE: 124 MMHG | HEIGHT: 65 IN | DIASTOLIC BLOOD PRESSURE: 78 MMHG | OXYGEN SATURATION: 98 % | TEMPERATURE: 97.1 F

## 2024-05-08 DIAGNOSIS — I63.9 ACUTE CVA (CEREBROVASCULAR ACCIDENT): ICD-10-CM

## 2024-05-08 DIAGNOSIS — R31.0 GROSS HEMATURIA: Primary | ICD-10-CM

## 2024-05-08 DIAGNOSIS — Z79.01 WARFARIN ANTICOAGULATION: ICD-10-CM

## 2024-05-08 LAB
BILIRUB BLD-MCNC: NEGATIVE MG/DL
CLARITY, POC: CLEAR
COLOR UR: YELLOW
EXPIRATION DATE: ABNORMAL
GLUCOSE UR STRIP-MCNC: NEGATIVE MG/DL
INR PPP: 1.91 (ref 0.9–1.1)
KETONES UR QL: NEGATIVE
LEUKOCYTE EST, POC: NEGATIVE
Lab: ABNORMAL
NITRITE UR-MCNC: NEGATIVE MG/ML
PH UR: 6 [PH] (ref 5–8)
PROT UR STRIP-MCNC: ABNORMAL MG/DL
PROTHROMBIN TIME: 22.5 SECONDS (ref 12.3–15.1)
RBC # UR STRIP: NEGATIVE /UL
SP GR UR: 1.02 (ref 1–1.03)
UROBILINOGEN UR QL: NORMAL

## 2024-05-08 PROCEDURE — 36415 COLL VENOUS BLD VENIPUNCTURE: CPT

## 2024-05-08 PROCEDURE — 85610 PROTHROMBIN TIME: CPT

## 2024-05-08 RX ORDER — WARFARIN SODIUM 5 MG/1
TABLET ORAL
Qty: 90 TABLET | Refills: 0 | Status: SHIPPED | OUTPATIENT
Start: 2024-05-08

## 2024-05-09 ENCOUNTER — ANTICOAGULATION VISIT (OUTPATIENT)
Dept: PHARMACY | Facility: HOSPITAL | Age: 54
End: 2024-05-09
Payer: MEDICARE

## 2024-05-09 DIAGNOSIS — Z79.01 WARFARIN ANTICOAGULATION: Primary | ICD-10-CM

## 2024-05-15 ENCOUNTER — LAB (OUTPATIENT)
Dept: LAB | Facility: HOSPITAL | Age: 54
End: 2024-05-15
Payer: MEDICARE

## 2024-05-15 ENCOUNTER — ANTICOAGULATION VISIT (OUTPATIENT)
Dept: PHARMACY | Facility: HOSPITAL | Age: 54
End: 2024-05-15
Payer: MEDICARE

## 2024-05-15 DIAGNOSIS — Z79.01 WARFARIN ANTICOAGULATION: Primary | ICD-10-CM

## 2024-05-15 DIAGNOSIS — I63.9 ACUTE CVA (CEREBROVASCULAR ACCIDENT): ICD-10-CM

## 2024-05-15 DIAGNOSIS — Z79.01 WARFARIN ANTICOAGULATION: ICD-10-CM

## 2024-05-15 LAB
INR PPP: 3.31 (ref 0.9–1.1)
PROTHROMBIN TIME: 34.4 SECONDS (ref 12.3–15.1)

## 2024-05-15 PROCEDURE — 36415 COLL VENOUS BLD VENIPUNCTURE: CPT

## 2024-05-15 PROCEDURE — 85610 PROTHROMBIN TIME: CPT

## 2024-05-15 NOTE — PROGRESS NOTES
Anticoagulation Clinic Progress Note  Indication: Atrial Fibrillation, Hx of CVA (09/2023)  Referring Provider: Matt Hernandez (referral received on 1/23/24)  Initial Warfarin Start Date: 09/2023  Planned Duration of Therapy: Lifelong   Goal INR: 2-3  Current Drug Interactions: Hydrocodone/Acetaminophen, Tramadol, Furosemide   [id number]: 4 (CHF, HTN, hx of CVA)  Bleed Risk: 2--No history of bleeding     Diet: Normally avoids (1/31/24), eats more GLV in the summer months about 2-3 weekly  Alcohol: None   Tobacco: No   OTC Pain Medication: Acetaminophen PRN     Anticoagulation Clinic INR History:  Date 1/31 2/2 2/6 2/9  2/14 2/20 2/26 3/6 3/26 4/29 5/8 5/15       Total Weekly Dose 70 mg 50 mg 10 mg 15 mg 35 mg 35 mg 30 mg 35 mg  35 mg 35 mg 35 mg 35 mg       INR 8.0 5.36   > 8 POCT 1.56 1.9 2.26 2.40 2.22 2.55 2.98 3.1 1.91 3.31       Notes  Hold x2 Hold x6 missx1 zpak  Miss x1 Unable to reach until 3/11   Rec'd 5/9 No GLV         Clinic Interview:  Tablet Strength: Warfarin 10 mg tablets , 5 mg tablet 3/26/24  Estimated OOP cost: [please send to registration if not already done]  Verbal Release Authorization signed on 2/2/24 -- may speak with wife Julienne Bueno  Contact information: [phone] (mobile)      Patient Findings  Positives: Change in medications, Change in diet/appetite, Other complaints   Negatives: Signs/symptoms of thrombosis, Signs/symptoms of bleeding, Laboratory test error suspected, Change in health, Change in alcohol use, Change in activity, Upcoming invasive procedure, Emergency department visit, Upcoming dental procedure, Missed doses, Extra doses, Hospital admission, Bruising   Comments: Did restart atorvastatin as mentioned at last encounter (had ran out for a few weeks). No salads this past week (ate 3 week prior).  Prostate exam and what sounds like a cystoscopy coming up at the end of June. Discussed with patient that he should not need to hold warfarin for a prostate exam and  cystoscopy but to double check this was what all was happening (he was not certain) and to make sure Urologist is aware he is on warfarin.       Plan:  1. INR is supratherapeutic today at 3.31 (goal INR 2-3). Of note, patient ate no GLV this past week after eating 3 salads last week with subsequent dose increase. Instructed Mr. Bueno to resume prior dose warfarin of warfarin 5mg oral daily until recheck. Also encouraged patient to eat consistent GLV in diet.  2. Recheck INR in 2 weeks, 5/29 at [facility] lab.  3. Verbal information provided over the phone. Matt Bueno expresses understanding by teach back and has no further questions at this time.    Meet Juarez, PharmD  5/15/2024   14:09 EDT

## 2024-05-31 ENCOUNTER — ANTICOAGULATION VISIT (OUTPATIENT)
Dept: PHARMACY | Facility: HOSPITAL | Age: 54
End: 2024-05-31
Payer: MEDICARE

## 2024-05-31 ENCOUNTER — LAB (OUTPATIENT)
Dept: LAB | Facility: HOSPITAL | Age: 54
End: 2024-05-31
Payer: MEDICARE

## 2024-05-31 DIAGNOSIS — Z79.01 WARFARIN ANTICOAGULATION: Primary | ICD-10-CM

## 2024-05-31 DIAGNOSIS — I63.9 ACUTE CVA (CEREBROVASCULAR ACCIDENT): ICD-10-CM

## 2024-05-31 DIAGNOSIS — Z79.01 WARFARIN ANTICOAGULATION: ICD-10-CM

## 2024-05-31 LAB
INR PPP: 2.63 (ref 0.9–1.1)
PROTHROMBIN TIME: 28.8 SECONDS (ref 12.3–15.1)

## 2024-05-31 PROCEDURE — 36415 COLL VENOUS BLD VENIPUNCTURE: CPT

## 2024-05-31 PROCEDURE — 85610 PROTHROMBIN TIME: CPT

## 2024-05-31 NOTE — PROGRESS NOTES
Anticoagulation Clinic Progress Note  Indication: Atrial Fibrillation, Hx of CVA (09/2023)  Referring Provider: Matt Hernandez (referral received on 1/23/24)  Initial Warfarin Start Date: 09/2023  Planned Duration of Therapy: Lifelong   Goal INR: 2-3  Current Drug Interactions: Hydrocodone/Acetaminophen, Tramadol, Furosemide   [id number]: 4 (CHF, HTN, hx of CVA)  Bleed Risk: 2--No history of bleeding     Diet: Normally avoids (1/31/24), eats more GLV in the summer months about 2-3 weekly  Alcohol: None   Tobacco: No   OTC Pain Medication: Acetaminophen PRN     Anticoagulation Clinic INR History:  Date 1/31 2/2 2/6 2/9  2/14 2/20 2/26 3/6 3/26 4/29 5/8 5/15 5/31      Total Weekly Dose 70 mg 50 mg 10 mg 15 mg 35 mg 35 mg 30 mg 35 mg  35 mg 35 mg 35 mg 35 mg 35 mg      INR 8.0 5.36   > 8 POCT 1.56 1.9 2.26 2.40 2.22 2.55 2.98 3.1 1.91 3.31 2.63      Notes  Hold x2 Hold x6 missx1 zpak  Miss x1 Unable to reach until 3/11   Rec'd 5/9 No GLV         Clinic Interview:  Tablet Strength: Warfarin 10 mg tablets , 5 mg tablet 3/26/24  Estimated OOP cost: [please send to registration if not already done]  Verbal Release Authorization signed on 2/2/24 -- may speak with wife Julienne Bueno  Contact information: [phone] (mobile)    Patient Findings    Positives: Missed doses   Negatives: Signs/symptoms of thrombosis, Signs/symptoms of bleeding, Laboratory test error suspected, Change in health, Change in alcohol use, Change in activity, Upcoming invasive procedure, Emergency department visit, Upcoming dental procedure, Extra doses, Change in medications, Change in diet/appetite, Hospital admission, Bruising, Other complaints   Comments: Patient says he belives he missed a dose of warfarin 8 days ago. All other findings negative per patient.     Plan:  1. INR is therapeutic today at 2.63 (goal INR 2-3). Instructed Mr. Bueno to continue warfarin 5mg oral daily until recheck.   2. Recheck INR in 2 weeks, 6/14/24 at   [facility]  3. Verbal information provided over the phone. Matt Hiltondayday expresses understanding by teach back and has no further questions at this time.      Maribell Bond CPhT   15:40 EDT 5/31/2024    I, Shauna Richter, [facility], have reviewed the note in full and agree with the assessment and plan.  06/03/24  07:58 EDT

## 2024-06-18 NOTE — TELEPHONE ENCOUNTER
Called patient to inquire about recent request for refill of warfarin 5 mg tablets 90 DS. Prescription for 90 tablets sent most recently on 5/8/24. LVM for patient to call back. He may just be needing prescription transferred to alternative pharmacy? He is also overdue for INR recheck.    Shauna Richter, PharmD  06/18/24   15:58 EDT

## 2024-06-27 ENCOUNTER — PROCEDURE VISIT (OUTPATIENT)
Dept: UROLOGY | Facility: CLINIC | Age: 54
End: 2024-06-27
Payer: MEDICARE

## 2024-06-27 DIAGNOSIS — B37.9 CANDIDIASIS: ICD-10-CM

## 2024-06-27 DIAGNOSIS — R31.0 GROSS HEMATURIA: Primary | ICD-10-CM

## 2024-06-27 RX ORDER — NYSTATIN 100000 [USP'U]/G
POWDER TOPICAL 2 TIMES DAILY
Qty: 30 G | Refills: 1 | Status: SHIPPED | OUTPATIENT
Start: 2024-06-27

## 2024-06-27 NOTE — PROGRESS NOTES
Preprocedure diagnosis  Gross hematuria    Postprocedure diagnosis  Lichen sclerosus/urethral stricture disease, nonflow limiting    Procedure  Flexible Cystourethroscopy    Attending surgeon  Tan Sloan MD    Anesthesia  2% lidocaine jelly intraurethrally    Complications  None    Indications  53 y.o. male undergoing a flexible cystoscopy for the above mentioned indications.  Informed consent was obtained.      Findings  Cystoscopic findings included one right and left ureteral orifice in the normal anatomic position with normal bladder mucosa and no tumors, masses or stones. The urethral urothelium was abnormal consistent with multiple anterior urethral strictures, consistent with lichen sclerosis there was not a prominent median lobe.  The lateral lobes were not really obstructive in appearance.      Patient morbidly obese with buried penis.  Bilateral groin rashes consistent with candidiasis    The patient denies any current lower urinary tract symptoms.    Procedure  The patient was placed in supine position and prepped and draped in sterile fashion with lidocaine jelly per urethra for anesthesia.  A timeout was performed.  The 14F flexible cystoscope was lubricated and gently placed through the penile urethra and into the bladder.  The bladder was completely visualized.  The cystoscope was retroflexed and the bladder neck and prostate visualized.  The cystoscope was slowly withdrawn while visualizing the urethra and the procedure terminated.  The patient tolerated the procedure well.      CT Abdomen Pelvis With & Without Contrast  Result Date: 3/14/2024  1. No evidence of renal neoplasm or renal stone disease 2. Dominant lower pole right renal cyst 3. Grossly unremarkable appearance of the bladder   This study was performed with techniques to keep radiation doses as low as reasonably achievable (ALARA). Individualized dose reduction techniques using automated exposure control or adjustment of vA  and/or kV according to the patient size were employed.  This report was signed and finalized on 3/14/2024 8:13 AM by Thiago Alas MD.      Plan  Follow-up with us as needed  2 weeks nystatin powder

## 2024-07-11 PROCEDURE — 93295 DEV INTERROG REMOTE 1/2/MLT: CPT | Performed by: INTERNAL MEDICINE

## 2024-07-11 PROCEDURE — 93296 REM INTERROG EVL PM/IDS: CPT | Performed by: INTERNAL MEDICINE

## 2024-07-12 ENCOUNTER — TELEPHONE (OUTPATIENT)
Dept: PHARMACY | Facility: HOSPITAL | Age: 54
End: 2024-07-12

## 2024-07-12 NOTE — TELEPHONE ENCOUNTER
LVM with patient reminding him he is overdue for an INR check.    Maribell Bond CPhT   15:39 EDT 7/12/2024

## 2024-07-16 ENCOUNTER — LAB (OUTPATIENT)
Dept: LAB | Facility: HOSPITAL | Age: 54
End: 2024-07-16
Payer: MEDICARE

## 2024-07-16 ENCOUNTER — ANTICOAGULATION VISIT (OUTPATIENT)
Dept: PHARMACY | Facility: HOSPITAL | Age: 54
End: 2024-07-16
Payer: MEDICARE

## 2024-07-16 DIAGNOSIS — I63.9 ACUTE CVA (CEREBROVASCULAR ACCIDENT): ICD-10-CM

## 2024-07-16 DIAGNOSIS — Z79.01 WARFARIN ANTICOAGULATION: Primary | ICD-10-CM

## 2024-07-16 DIAGNOSIS — Z79.01 WARFARIN ANTICOAGULATION: ICD-10-CM

## 2024-07-16 LAB
INR PPP: 2.2 (ref 0.9–1.1)
PROTHROMBIN TIME: 25.2 SECONDS (ref 12.3–15.1)

## 2024-07-16 PROCEDURE — 36415 COLL VENOUS BLD VENIPUNCTURE: CPT

## 2024-07-16 PROCEDURE — 85610 PROTHROMBIN TIME: CPT

## 2024-07-16 NOTE — PROGRESS NOTES
Anticoagulation Clinic Progress Note  Indication: Atrial Fibrillation, Hx of CVA (09/2023)  Referring Provider: Matt Hernandez (referral received on 1/23/24)  Initial Warfarin Start Date: 09/2023  Planned Duration of Therapy: Lifelong   Goal INR: 2-3  Current Drug Interactions: Hydrocodone/Acetaminophen, Tramadol, Furosemide   [id number]: 4 (CHF, HTN, hx of CVA)  Bleed Risk: 2--No history of bleeding     Diet: Normally avoids (1/31/24), eats more GLV in the summer months about 2-3 weekly  Alcohol: None   Tobacco: No   OTC Pain Medication: Acetaminophen PRN     Anticoagulation Clinic INR History:  Date 1/31 2/2 2/6 2/9  2/14 2/20 2/26 3/6 3/26 4/29 5/8 5/15 5/31 7/16     Total Weekly Dose 70 mg 50 mg 10 mg 15 mg 35 mg 35 mg 30 mg 35 mg  35 mg 35 mg 35 mg 35 mg 35 mg 35 mg     INR 8.0 5.36   > 8 POCT 1.56 1.9 2.26 2.40 2.22 2.55 2.98 3.1 1.91 3.31 2.63 2.2     Notes  Hold x2 Hold x6 missx1 zpak  Miss x1 Unable to reach until 3/11   Rec'd 5/9 No GLV         Clinic Interview:  Tablet Strength: Warfarin 10 mg tablets , 5 mg tablet 3/26/24  Estimated OOP cost: [please send to registration if not already done]  Verbal Release Authorization signed on 2/2/24 -- may speak with wife Julienne Bueno  Contact information: [phone] (mobile)    Patient Findings    Negatives: Signs/symptoms of thrombosis, Signs/symptoms of bleeding, Laboratory test error suspected, Change in health, Change in alcohol use, Change in activity, Upcoming invasive procedure, Emergency department visit, Upcoming dental procedure, Missed doses, Extra doses, Change in medications, Change in diet/appetite, Hospital admission, Bruising, Other complaints   Comments: Patient reports no medication changes since previous encounter. He states he has shredded lettuce on tacos, etc ~ 2x/week but that is all the GLV he consumes. He denies any of the other above listed findings.     Plan:  1. INR is therapeutic today at 2.20 (goal INR 2-3). Instructed   [facility] MEDICINE PROGRESS NOTE   Patient: Dwight Elliott  Today's Date: 3/28/2022    YOB: 1951  Admission Date: 3/14/2022    MRN: [id number]  Inpatient LOS: 10    Room: 303/01  Hospital Day: Hospital Day: 15    Subjective   HISTORY AND SUBJECTIVE COMPLAINTS     Chief Complaint:   Confusion    Interval History / Subjective:   Patient seen and examined at bedside. Overnight events reviewed. The patient had no overnight events. Denies any shortness of breath or pain.     Hospital Course:  Dwight Elliott is a 71 year old male who presented on 3/14/2022 with complaints of Altered Mental Status and Laceration  .    ROS:  Pertinent systems negative except as above.    Objective   PHYSICAL EXAMINATION     Vital 24 Hour Range Most Recent Value   Temperature Temp  Min: 97.4 °F (36.3 °C)  Max: 97.5 °F (36.4 °C) 97.5 °F (36.4 °C)   Pulse Pulse  Min: 84  Max: 101 84   Respiratory Resp  Min: 18  Max: 18 18   Blood Pressure BP  Min: 123/80  Max: 124/86 123/80   Pulse Oximetry SpO2  Min: 94 %  Max: 96 % 94 %   Arterial BP No data recorded     O2 No data recorded       Recorded Intake and Output:    Intake/Output Summary (Last 24 hours) at 3/28/2022 1222  Last data filed at 3/28/2022 0800  Gross per 24 hour   Intake 340 ml   Output --   Net 340 ml      Recorded Last Stool Occurrence: 1 (03/28/22 0900)     Vital Most Recent Value First Value   Weight 55.7 kg (122 lb 12.7 oz) Weight: 62.7 kg (138 lb 3.2 oz)   Height 6' (182.9 cm) Height: 6' (182.9 cm)   BMI 16.65 N/A     General: Looks well well developed, well nourished and no apparent distress  CV: regular rate and rhythm and no murmurs, rubs, or thrills  Resp: clear to auscultation bilaterally and no accessory muscle use   Abd: soft, nontender, nondistended and no hepatosplenomegaly  Ext: edema absent  and posterior tibial pulses equal bilaterally     TEST RESULTS     Labs: The Laboratory values listed below have been reviewed and pertinent  Myles to continue warfarin 5mg oral daily until recheck.   2. Recheck INR in 4 weeks, 8/13/24, at [facility]  3. Verbal information provided over the phone. Matt Bueno expresses understanding by teach back and has no further questions at this time.    Thank you,     Shauna Richter, PharmD  07/16/24   10:34 EDT      findings discussed in the Assessment and Plan.    Laboratory values:   Recent Labs   Lab 03/23/22  0622   WBC 6.1   HGB 18.7*   HCT 56.3*          Recent Labs   Lab 03/23/22  1903   SODIUM 140   POTASSIUM 4.6   CHLORIDE 104   CO2 30   CALCIUM 10.6*   GLUCOSE 120*   BUN 32*   CREATININE 0.85          Radiology: Imaging studies have been reviewed and pertinent findings discussed in the Assessment and Plan.  No results found for any visits on 03/14/22 (from the past 48 hour(s)).     ANCILLARY ORDERS     Diet:  Nursing To Pass Tray - Feed Patient  3 Times/day W Meals; Ensure Enlive/standard Oral Supplement, Chocolate Oral Nutrition Supplement  Regular, Dysphagia 2/ground/minced; Meds Crushed - Feed Only If Alert Diet  Telemetry: Off  Consults:    IP CONSULT TO NEUROLOGY  IP CONSULT TO PSYCHIATRY  IP CONSULT TO NEUROLOGY  IP CONSULT TO NEUROLOGY  Therapy Orders:   PT and OT Orders Placed this Encounter   Procedures   • Occupational Therapy   • Occupational Therapy   • Physical Therapy   • Physical Therapy       Advance Care Planning    ADVANCED DIRECTIVES     Code Status: Full Resuscitation          ASSESSMENT AND PLAN     ·Acute encephalopathy of unknown cause  -Mental state fluctuates, suspect dementia with behavioral disturbances, low B12 levels  -Wernicke's encephalopathy is a possibility due to his alcohol abuse. Continue high dose thiamine per replacement protocol  -Cannot rule out dementia with behavioral disturbances, outpatient psychiatry follow-up  - CSF HSV IgM negative and the reflexive confirmatory HSV type 2 glycoprotein G specfic antigen was negative. Therefore we can confidently say that patient does not have an acute herpes encephalitis at this time per TELE Neurology  -MRI suggested possible punctate infarct versus eggs only injury, right subdural hematoma measuring less than 2 mm with no mass effect  -TSH normal, B12 on lower side, started on replacement, EEG was abnormal. The diffuse background  slowing suggest mild encephalopathy.  -Case discussed with tele Psychiatry, Depakote and olanzapine started, p.r.n. IM haloperidol, continue to check QTC  -Might need placement, as patient with frequent delirium symptoms at night.  - sitter stopped 3/26 and mental status improving snce     SDH: stable  - No mass effect, repeat CT on 3/24 stable, no subdural hematoma is visible     Dysphagia: due to mental status.  -PO intake fluctuates SLT to follow.     Cardiac Murmur:  Echo did not show any acute abnormality with ejection fraction being 65%, no valvular abnormalities, severe concentric LVH with increased left ventricular filling pressure     HTN: uncontrolled. But could be due to agitation.   -Monitor closely. Continue  increased amlodipine.    Macrocytic anemia with low B12 and folic acid  - started on replacement     Alcohol dependence and abuse with withdrawal delirium, not POA  -  CIWA, thiamine     Smoking status: non smoker    Nutrition status: appropriate  Body mass index is 16.89 kg/m². - underweight BMI<18.5  DVT Prophylaxis: SCDs         DISCHARGE PLANNING     The patient's treatment plans were discussed with patient.       Recommendations for Discharge   SW     PT SNF   OT SNF   SLP home with assitance or SNF      Anticipated discharge destination: Skilled Nursing Facility SNF  Expected Discharge Date: UUknown  Barriers to Discharge: Pending Identification of a bed at an appropriate post-acute facility and/or Insurance authorization for the post-acute bed.            Chai Morales MD  Hospitalist  3/28/2022  12:22 PM

## 2024-10-25 ENCOUNTER — OFFICE VISIT (OUTPATIENT)
Dept: FAMILY MEDICINE CLINIC | Facility: CLINIC | Age: 54
End: 2024-10-25
Payer: MEDICARE

## 2024-10-25 ENCOUNTER — HOSPITAL ENCOUNTER (OUTPATIENT)
Dept: GENERAL RADIOLOGY | Facility: HOSPITAL | Age: 54
Discharge: HOME | End: 2024-10-25
Payer: MEDICARE

## 2024-10-25 VITALS
RESPIRATION RATE: 18 BRPM | TEMPERATURE: 98.6 F | BODY MASS INDEX: 52.48 KG/M2 | HEART RATE: 88 BPM | OXYGEN SATURATION: 93 % | HEIGHT: 65 IN | WEIGHT: 315 LBS | DIASTOLIC BLOOD PRESSURE: 78 MMHG | SYSTOLIC BLOOD PRESSURE: 106 MMHG

## 2024-10-25 DIAGNOSIS — G47.33 OSA ON CPAP: ICD-10-CM

## 2024-10-25 DIAGNOSIS — Z00.00 MEDICARE ANNUAL WELLNESS VISIT, SUBSEQUENT: Primary | ICD-10-CM

## 2024-10-25 DIAGNOSIS — Z12.5 SCREENING FOR PROSTATE CANCER: ICD-10-CM

## 2024-10-25 DIAGNOSIS — R06.09 DYSPNEA ON EXERTION: ICD-10-CM

## 2024-10-25 DIAGNOSIS — Z79.01 WARFARIN ANTICOAGULATION: ICD-10-CM

## 2024-10-25 DIAGNOSIS — Z11.59 ENCOUNTER FOR HEPATITIS C SCREENING TEST FOR LOW RISK PATIENT: ICD-10-CM

## 2024-10-25 DIAGNOSIS — I50.22 CHRONIC HFREF (HEART FAILURE WITH REDUCED EJECTION FRACTION): ICD-10-CM

## 2024-10-25 PROCEDURE — 71046 X-RAY EXAM CHEST 2 VIEWS: CPT

## 2024-10-25 PROCEDURE — 1170F FXNL STATUS ASSESSED: CPT | Performed by: FAMILY MEDICINE

## 2024-10-25 PROCEDURE — 99214 OFFICE O/P EST MOD 30 MIN: CPT | Performed by: FAMILY MEDICINE

## 2024-10-25 PROCEDURE — G0439 PPPS, SUBSEQ VISIT: HCPCS | Performed by: FAMILY MEDICINE

## 2024-10-25 NOTE — PROGRESS NOTES
Subjective   The ABCs of the Annual Wellness Visit  Medicare Wellness Visit      Matt Bueno is a 54 y.o. patient who presents for a Medicare Wellness Visit.    The following portions of the patient's history were reviewed and   updated as appropriate: allergies, current medications, past family history, past medical history, past social history, past surgical history, and problem list.    Compared to one year ago, the patient's physical   health is worse.  Compared to one year ago, the patient's mental   health is the same.    Recent Hospitalizations:  He was admitted within the past 365 days at [facility]. This was for his Afib    Current Medical Providers:  Patient Care Team:  Stas Rust MD as PCP - General (Family Medicine)  Noy Argueta APRN as Nurse Practitioner (Cardiology)    Outpatient Medications Prior to Visit   Medication Sig Dispense Refill    atorvastatin (LIPITOR) 80 MG tablet Take 1 tablet by mouth Every Night. 90 tablet 0    carvedilol (COREG) 12.5 MG tablet Take 1 tablet by mouth 2 (Two) Times a Day With Meals. 180 tablet 3    dapagliflozin (FARXIGA) 5 MG tablet tablet Take 1 tablet by mouth Daily. 30 tablet 6    furosemide (LASIX) 40 MG tablet Take 1 tablet by mouth Daily. 30 tablet 0    HYDROcodone-acetaminophen (NORCO)  MG per tablet Take 1 tablet by mouth Every 6 (Six) Hours.      ipratropium-albuterol (DUO-NEB) 0.5-2.5 mg/3 ml nebulizer Take 3 mL by nebulization Every 4 (Four) Hours As Needed for Wheezing. 120 mL 0    Iron, Ferrous Sulfate, 325 (65 Fe) MG tablet Take 1 tablet by mouth Daily. 30 tablet 2    nystatin (MYCOSTATIN) 306877 UNIT/GM powder Apply topically to the appropriate area as directed 2 (Two) Times a Day for 2 weeks 30 g 1    omeprazole (priLOSEC) 20 MG capsule Take 1 capsule by mouth Daily.      sacubitril-valsartan (ENTRESTO)  MG tablet Take 1 tablet by mouth 2 (Two) Times a Day. Hold if SBP < 100 180 tablet 3    spironolactone (ALDACTONE) 25  "MG tablet Take 1 tablet by mouth Daily. 30 tablet 11    traMADol (ULTRAM) 50 MG tablet Take 1 tablet by mouth Every 12 (Twelve) Hours.      warfarin (Coumadin) 5 MG tablet Take one tablet by mouth daily OR as directed by anticoagulation clinic 90 tablet 1     No facility-administered medications prior to visit.     Opioid medication/s are on active medication list.  and I have evaluated his active treatment plan and pain score trends (see table).  There were no vitals filed for this visit.  I have reviewed the chart for potential of high risk medication and harmful drug interactions in the elderly.            Patient Active Problem List   Diagnosis    LBP (low back pain)    Adiposity    POOJA on CPAP    Screen for colon cancer    Morbidly obese    Cardiomyopathy    Dilated cardiomyopathy    Chronic systolic congestive heart failure    Acute CVA (cerebrovascular accident)    Chronic systolic CHF (congestive heart failure)    History of cardiac defibrillator placement    Ischemic stroke    Chronic HFrEF (heart failure with reduced ejection fraction)    Warfarin anticoagulation     Advance Care Planning Advance Directive is not on file.  ACP discussion was held with the patient during this visit. Patient does not have an advance directive, information provided.            Objective   Vitals:    10/25/24 1147   BP: 106/78   Pulse: 88   Resp: 18   Temp: 98.6 °F (37 °C)   SpO2: 93%   Weight: (!) 177 kg (391 lb)   Height: 165.1 cm (65\")       Estimated body mass index is 65.07 kg/m² as calculated from the following:    Height as of this encounter: 165.1 cm (65\").    Weight as of this encounter: 177 kg (391 lb).           Does the patient have evidence of cognitive impairment? No                                                                                                Health  Risk Assessment    Smoking Status:  Social History     Tobacco Use   Smoking Status Never    Passive exposure: Past   Smokeless Tobacco Never "     Alcohol Consumption:  Social History     Substance and Sexual Activity   Alcohol Use Yes    Comment: VERY rarely       Fall Risk Screen  CONCEPCIÓN Fall Risk Assessment has not been completed.    Depression Screenin/11/2023     1:36 PM   PHQ-2/PHQ-9 Depression Screening   Retired Little Interest or Pleasure in Doing Things 0-->not at all   Retired Feeling Down, Depressed or Hopeless 0-->not at all   Retired PHQ-9: Brief Depression Severity Measure Score 0     Health Habits and Functional and Cognitive Screenin/9/2022     2:00 PM   Functional & Cognitive Status   Do you have difficulty preparing food and eating? No   Do you have difficulty bathing yourself, getting dressed or grooming yourself? No   Do you have difficulty using the toilet? No   Do you have difficulty moving around from place to place? No   Do you have trouble with steps or getting out of a bed or a chair? No   Current Diet Well Balanced Diet   Dental Exam Up to date   Eye Exam Up to date   Exercise (times per week) 3 times per week   Current Exercises Include Walking   Do you need help using the phone?  No   Are you deaf or do you have serious difficulty hearing?  No   Do you need help to go to places out of walking distance? No   Do you need help shopping? No   Do you need help preparing meals?  No   Do you need help with housework?  No   Do you need help with laundry? No   Do you need help taking your medications? No   Do you need help managing money? No   Do you ever drive or ride in a car without wearing a seat belt? No   Have you felt unusual stress, anger or loneliness in the last month? No   Who do you live with? Spouse   If you need help, do you have trouble finding someone available to you? No   Have you been bothered in the last four weeks by sexual problems? No   Do you have difficulty concentrating, remembering or making decisions? No           Age-appropriate Screening Schedule:  Refer to the list below for future  screening recommendations based on patient's age, sex and/or medical conditions. Orders for these recommended tests are listed in the plan section. The patient has been provided with a written plan.    Health Maintenance List  Health Maintenance   Topic Date Due    Pneumococcal Vaccine 0-64 (1 of 2 - PCV) Never done    TDAP/TD VACCINES (1 - Tdap) Never done    HEPATITIS C SCREENING  Never done    ZOSTER VACCINE (1 of 2) Never done    ANNUAL WELLNESS VISIT  09/09/2023    INFLUENZA VACCINE  Never done    COVID-19 Vaccine (3 - 2023-24 season) 09/01/2024    LIPID PANEL  09/30/2024    BMI FOLLOWUP  10/03/2024    COLORECTAL CANCER SCREENING  10/07/2025                                                                                                                                                CMS Preventative Services Quick Reference  Risk Factors Identified During Encounter  Inactivity/Sedentary: Patient was advised to exercise at least 150 minutes a week per CDC recommendations.  Polypharmacy: Medication List reviewed    The above risks/problems have been discussed with the patient.  Pertinent information has been shared with the patient in the After Visit Summary.  An After Visit Summary and PPPS were made available to the patient.    Follow Up:   Next Medicare Wellness visit to be scheduled in 1 year.         Additional E&M Note during same encounter follows:  Patient has additional, significant, and separately identifiable condition(s)/problem(s) that require work above and beyond the Medicare Wellness Visit     Chief Complaint  Rx CPAP supplies ([facility]), Handicap sticker, and Shortness of Breath (Went out of town and forgot to take water pill but taking them now. )    Subjective   HPI  Matt is also being seen today for additional medical problem/s.      He has a CPAP machine and is using it every night  He needs new supplies for this  Would like to keep getting these from [facility]      He has been getting more  "and more SOA with exertion  He cannot walk and carry things due to his SOA    He will be seeinghis cardiologist next month    Review of Systems   Constitutional:  Positive for fatigue.   Respiratory:  Positive for shortness of breath.    Cardiovascular:  Negative for chest pain.   Psychiatric/Behavioral: Negative.                Objective   Vital Signs:  /78   Pulse 88   Temp 98.6 °F (37 °C)   Resp 18   Ht 165.1 cm (65\")   Wt (!) 177 kg (391 lb)   SpO2 93%   BMI 65.07 kg/m²   Physical Exam  Vitals and nursing note reviewed.   Constitutional:       General: He is not in acute distress.     Appearance: Normal appearance. He is well-developed. He is obese.   Cardiovascular:      Rate and Rhythm: Normal rate and regular rhythm.      Heart sounds: Normal heart sounds.   Pulmonary:      Effort: Pulmonary effort is normal.      Breath sounds: Normal breath sounds.   Neurological:      Mental Status: He is alert and oriented to person, place, and time.   Psychiatric:         Mood and Affect: Mood normal.         Behavior: Behavior normal.         Thought Content: Thought content normal.         Judgment: Judgment normal.               Assessment and Plan     Diagnoses and all orders for this visit:    1. Medicare annual wellness visit, subsequent (Primary)    2. POOJA on CPAP    3. Chronic HFrEF (heart failure with reduced ejection fraction)  -     CBC & Differential  -     Comprehensive Metabolic Panel  -     Lipid Panel  -     proBNP    4. Dyspnea on exertion  -     XR Chest PA & Lateral; Future  -     Complete PFT - Pre & Post Bronchodilator; Future    5. Warfarin anticoagulation  -     Protime-INR    6. Screening for prostate cancer  -     PSA Screen    7. Encounter for hepatitis C screening test for low risk patient  -     Hepatitis C Antibody      Medicare wellness completed.  Morbid obesity remains an iussue.  Diet and exercise as tolerated recommended.  PSA to be drawn    Refills of CPAP supplies written " for today.  He does need to continue his CPAP machine    SOA is new and worse, will check PFT, CXR, and f/u with cardiology with known CHF.  F/u pending labs

## 2024-10-26 LAB
ALBUMIN SERPL-MCNC: 3.9 G/DL (ref 3.8–4.9)
ALP SERPL-CCNC: 65 IU/L (ref 44–121)
ALT SERPL-CCNC: 6 IU/L (ref 0–44)
AST SERPL-CCNC: 11 IU/L (ref 0–40)
BASOPHILS # BLD AUTO: 0.1 X10E3/UL (ref 0–0.2)
BASOPHILS NFR BLD AUTO: 1 %
BILIRUB SERPL-MCNC: 0.7 MG/DL (ref 0–1.2)
BUN SERPL-MCNC: 16 MG/DL (ref 6–24)
BUN/CREAT SERPL: 20 (ref 9–20)
CALCIUM SERPL-MCNC: 9.1 MG/DL (ref 8.7–10.2)
CHLORIDE SERPL-SCNC: 103 MMOL/L (ref 96–106)
CHOLEST SERPL-MCNC: 163 MG/DL (ref 100–199)
CO2 SERPL-SCNC: 27 MMOL/L (ref 20–29)
CREAT SERPL-MCNC: 0.79 MG/DL (ref 0.76–1.27)
EGFRCR SERPLBLD CKD-EPI 2021: 104 ML/MIN/1.73
EOSINOPHIL # BLD AUTO: 0.4 X10E3/UL (ref 0–0.4)
EOSINOPHIL NFR BLD AUTO: 4 %
ERYTHROCYTE [DISTWIDTH] IN BLOOD BY AUTOMATED COUNT: 14.2 % (ref 11.6–15.4)
GLOBULIN SER CALC-MCNC: 2.8 G/DL (ref 1.5–4.5)
GLUCOSE SERPL-MCNC: 101 MG/DL (ref 70–99)
HCT VFR BLD AUTO: 36.1 % (ref 37.5–51)
HCV IGG SERPL QL IA: NON REACTIVE
HDLC SERPL-MCNC: 40 MG/DL
HGB BLD-MCNC: 11.2 G/DL (ref 13–17.7)
IMM GRANULOCYTES # BLD AUTO: 0 X10E3/UL (ref 0–0.1)
IMM GRANULOCYTES NFR BLD AUTO: 0 %
INR PPP: 2.4 (ref 0.9–1.2)
LDLC SERPL CALC-MCNC: 107 MG/DL (ref 0–99)
LYMPHOCYTES # BLD AUTO: 1.5 X10E3/UL (ref 0.7–3.1)
LYMPHOCYTES NFR BLD AUTO: 18 %
MCH RBC QN AUTO: 25.3 PG (ref 26.6–33)
MCHC RBC AUTO-ENTMCNC: 31 G/DL (ref 31.5–35.7)
MCV RBC AUTO: 82 FL (ref 79–97)
MONOCYTES # BLD AUTO: 0.9 X10E3/UL (ref 0.1–0.9)
MONOCYTES NFR BLD AUTO: 10 %
NEUTROPHILS # BLD AUTO: 5.9 X10E3/UL (ref 1.4–7)
NEUTROPHILS NFR BLD AUTO: 67 %
NT-PROBNP SERPL-MCNC: 2713 PG/ML (ref 0–210)
PLATELET # BLD AUTO: 280 X10E3/UL (ref 150–450)
POTASSIUM SERPL-SCNC: 4.5 MMOL/L (ref 3.5–5.2)
PROT SERPL-MCNC: 6.7 G/DL (ref 6–8.5)
PROTHROMBIN TIME: 25.2 SEC (ref 9.1–12)
PSA SERPL-MCNC: 0.2 NG/ML (ref 0–4)
RBC # BLD AUTO: 4.43 X10E6/UL (ref 4.14–5.8)
SODIUM SERPL-SCNC: 143 MMOL/L (ref 134–144)
TRIGL SERPL-MCNC: 86 MG/DL (ref 0–149)
VLDLC SERPL CALC-MCNC: 16 MG/DL (ref 5–40)
WBC # BLD AUTO: 8.8 X10E3/UL (ref 3.4–10.8)

## 2024-10-28 ENCOUNTER — ANTICOAGULATION VISIT (OUTPATIENT)
Dept: PHARMACY | Facility: HOSPITAL | Age: 54
End: 2024-10-28
Payer: MEDICARE

## 2024-10-28 ENCOUNTER — TELEPHONE (OUTPATIENT)
Dept: FAMILY MEDICINE CLINIC | Facility: CLINIC | Age: 54
End: 2024-10-28
Payer: MEDICARE

## 2024-10-28 DIAGNOSIS — Z79.01 WARFARIN ANTICOAGULATION: Primary | ICD-10-CM

## 2024-10-28 NOTE — PROGRESS NOTES
Anticoagulation Clinic Progress Note  Indication: Atrial Fibrillation, Hx of CVA (09/2023)  Referring Provider: Matt Hernandez (referral received on 1/23/24)  Initial Warfarin Start Date: 09/2023  Planned Duration of Therapy: Lifelong   Goal INR: 2-3  Current Drug Interactions: Hydrocodone/Acetaminophen, Tramadol, Furosemide   [id number]: 4 (CHF, HTN, hx of CVA)  Bleed Risk: 2--No history of bleeding     Diet: Normally avoids (1/31/24), eats more GLV in the summer months about 2-3 weekly  Alcohol: None   Tobacco: No   OTC Pain Medication: Acetaminophen PRN     Anticoagulation Clinic INR History:  Date 1/31 2/2 2/6 2/9  2/14 2/20 2/26 3/6 3/26 4/29 5/8 5/15 5/31 7/16 10/25    Total Weekly Dose 70 mg 50 mg 10 mg 15 mg 35 mg 35 mg 30 mg 35 mg  35 mg 35 mg 35 mg 35 mg 35 mg 35 mg 35 mg    INR 8.0 5.36   > 8 POCT 1.56 1.9 2.26 2.40 2.22 2.55 2.98 3.1 1.91 3.31 2.63 2.2 2.4    Notes  Hold x2 Hold x6 missx1 zpak  Miss x1 Unable to reach until 3/11   Rec'd 5/9 No GLV   Rec'd 10/28 unable to reach       Clinic Interview:  Tablet Strength: Warfarin 10 mg tablets , 5 mg tablet 3/26/24  Estimated OOP cost: [please send to registration if not already done]  Verbal Release Authorization signed on 2/2/24 -- may speak with wife Julienne Bueno  Contact information: [phone] (mobile)    UNABLE TO GET IN CONTACT WITH THE PATIENT. PLEASE DISREGARD THE FOLLOWING PLAN UNTIL ABLE TO GET IN CONTACT WITH PATIENT/ PATIENT REPRESENTATIVE.  [facility] 10/28, 10/30, 10/31    Plan:  1. INR is therapeutic today at 2.4 (goal INR 2-3). Instructed Mr. Bueno to continue warfarin 5mg oral daily until recheck.   2. Recheck INR in 4 weeks, 11/22/24, at [facility]  3. Verbal information provided over the phone. Matt Bueno expresses understanding by teach back and has no further questions at this time.

## 2024-10-28 NOTE — TELEPHONE ENCOUNTER
PATIENT STATED THAT ORDERS FOR HIS CPAP SUPPLIES WAS SAID TO BEEN ORDERED AT LAST APPOINTMENT AND WAS TOLD THE SUPPLIER HASN'T RECEIVED THEM YET. WANTED TO CALL AND SEE WHAT WAS GOING ON WITH THAT        PLEASE ADVISE

## 2024-11-06 NOTE — PROGRESS NOTES
Subjective:     Encounter Date:11/08/2024    Primary Care Physician: Stas Rust MD      Patient ID: Matt Bueno is a 54 y.o. male.    Chief Complaint:Atrial fibrillation, persistent      PROBLEM LIST:  Nonischemic cardiomyopathy:  Echocardiogram, 10/31/2018: EF 25%. Mild MR. RVSP 57 mmHg.  [facility], 11/16/2018: Normal coronary arteries. Dilated cardiomyopathy with EF 15%. Significantly elevated LVEDP.  Echocardiogram, 02/20/2019: EF 25%. Mild-to-moderate MR.  ICD placement, 04/01/2019, Dr. Day: [employer] VVI ICD, model Dynagen EL ICD VR, serial #[id number].  Echocardiogram, 02/03/20: EF 30%. Mild MR.  Echo, 09/29/2023: EF 36-40%. Left ventricular diastolic function is consistent with (grade II w/high LAP) pseudonormalization. The left atrial cavity is moderate to severely dilated. Left atrial volume is mildly increased. Negative saline test. Normal RVSP.  Carotid duplex, 09/29/2023: Mild plaque within the left carotid bulb causing 50% stenosis or less. No plaque identified in the right carotid bulb, no stenosis seen.  Morbid obesity:  Peak weight 500 lbs.  CVA  9/23/2023, left MCA territory.  Presumed thromboembolic, placed on warfarin  Paroxysmal atrial fibrillation   Asymptomatic, noted on device check.  sleep apnea on CPAP  Incomplete LBBB  Surgeries:  Gastric sleeve  Left knee arthroscopy  Bilateral shoulder surgery  [place] teeth extracted        No Known Allergies      Current Outpatient Medications:     atorvastatin (LIPITOR) 80 MG tablet, Take 1 tablet by mouth Every Night., Disp: 90 tablet, Rfl: 0    carvedilol (COREG) 12.5 MG tablet, Take 1 tablet by mouth 2 (Two) Times a Day With Meals., Disp: 180 tablet, Rfl: 3    dapagliflozin (FARXIGA) 5 MG tablet tablet, Take 1 tablet by mouth Daily., Disp: 30 tablet, Rfl: 6    furosemide (LASIX) 40 MG tablet, Take 1 tablet by mouth Daily., Disp: 30 tablet, Rfl: 0    HYDROcodone-acetaminophen (NORCO)  MG per tablet, Take 1 tablet by mouth Every 6  "(Six) Hours., Disp: , Rfl:     Iron, Ferrous Sulfate, 325 (65 Fe) MG tablet, Take 1 tablet by mouth Daily., Disp: 30 tablet, Rfl: 2    omeprazole (priLOSEC) 20 MG capsule, Take 1 capsule by mouth Daily., Disp: , Rfl:     sacubitril-valsartan (ENTRESTO)  MG tablet, Take 1 tablet by mouth 2 (Two) Times a Day. Hold if SBP < 100, Disp: 180 tablet, Rfl: 3    traMADol (ULTRAM) 50 MG tablet, Take 1 tablet by mouth Every 12 (Twelve) Hours., Disp: , Rfl:     warfarin (Coumadin) 5 MG tablet, Take one tablet by mouth daily OR as directed by anticoagulation clinic, Disp: 90 tablet, Rfl: 1        History of Present Illness    Patient is a 54-year-old  male who presents today for follow-up of cardiomyopathy, atrial fibrillation and HFrEF.  Since last being seen patient has been experiencing some shortness of breath with exertion.  No significant chest pain.  Has also been having some recurrent issues with volume overload.  No reported syncope or presyncope.  Had recent chest x-ray consistent with pulmonary vascular congestion.    The following portions of the patient's history were reviewed and updated as appropriate: allergies, current medications, past family history, past medical history, past social history, past surgical history and problem list.      Social History     Tobacco Use    Smoking status: Never     Passive exposure: Past    Smokeless tobacco: Never   Vaping Use    Vaping status: Never Used   Substance Use Topics    Alcohol use: Not Currently     Alcohol/week: 5.0 standard drinks of alcohol     Types: 2 Cans of beer, 3 Shots of liquor per week     Comment: this is  what i drink in a year    Drug use: No         ROS       Objective:   /66 (BP Location: Right arm, Patient Position: Sitting, Cuff Size: Adult)   Pulse 76   Ht 165.1 cm (65\")   Wt (!) 173 kg (381 lb 12.8 oz)   SpO2 96%   BMI 63.53 kg/m²         Vitals reviewed.   Constitutional:       Appearance: Well-developed and not in " distress.      Comments: Morbidly obese   Neck:      Vascular: No JVD.      Trachea: No tracheal deviation.   Pulmonary:      Effort: Pulmonary effort is normal.      Breath sounds: Normal breath sounds.   Cardiovascular:      Normal rate. Regular rhythm.      Murmurs: There is no murmur.   Edema:     Peripheral edema absent.   Musculoskeletal:         General: No deformity. Skin:     General: Skin is warm and dry.   Neurological:      Mental Status: Alert and oriented to person, place, and time.         Procedures  Device check:  Normal functioning single-chamber ICD.  Less than 1% ventricularly paced.  Battery voltage 10.5 years.  Underlying rhythm is sinus.  81 high ventricular rates.  EGM shows sinus.        Assessment:   Assessment & Plan      Diagnoses and all orders for this visit:    1. Acute on chronic HFrEF (heart failure with reduced ejection fraction) (Primary), recent volume overload.  On Lasix and Entresto.    2. Essential hypertension, marginal.  On carvedilol.    3. Atrial fibrillation, persistent, stable.  Chronically anticoagulated with warfarin.      Plan:  Given recurrent heart failure symptoms and no ischemic evaluation in 6 years will obtain cardiac PET.  Continue current cardiac medications.  Educated patient regarding as needed use of Lasix, daily weights, and sodium restriction.  Follow-up in 6 months time with a device check or sooner if needed.       Noy GANT             Dictated utilizing Dragon dictation

## 2024-11-08 ENCOUNTER — OFFICE VISIT (OUTPATIENT)
Dept: CARDIOLOGY | Facility: CLINIC | Age: 54
End: 2024-11-08
Payer: MEDICARE

## 2024-11-08 VITALS
WEIGHT: 315 LBS | HEIGHT: 65 IN | HEART RATE: 76 BPM | DIASTOLIC BLOOD PRESSURE: 66 MMHG | SYSTOLIC BLOOD PRESSURE: 106 MMHG | OXYGEN SATURATION: 96 % | BODY MASS INDEX: 52.48 KG/M2

## 2024-11-08 DIAGNOSIS — I10 ESSENTIAL HYPERTENSION: ICD-10-CM

## 2024-11-08 DIAGNOSIS — I48.19 ATRIAL FIBRILLATION, PERSISTENT: ICD-10-CM

## 2024-11-08 DIAGNOSIS — I50.23 ACUTE ON CHRONIC HFREF (HEART FAILURE WITH REDUCED EJECTION FRACTION): Primary | ICD-10-CM

## 2024-11-08 NOTE — LETTER
November 8, 2024     Stas Rust MD  [address]  [place]  [facility] [address]    Patient: Matt Bueno   YOB: 1970   Date of Visit: 11/8/2024     Dear Stas Rust MD:       Thank you for referring Matt Bueno to me for evaluation. Below are the relevant portions of my assessment and plan of care.    If you have questions, please do not hesitate to call me. I look forward to following Matt along with you.         Sincerely,        ALFREDA Sandoval        CC: No Recipients    Noy Argueta APRN  11/08/24 1108  Sign when Signing Visit  Subjective:     Encounter Date:11/08/2024    Primary Care Physician: Stas Rust MD      Patient ID: Matt Bueno is a 54 y.o. male.    Chief Complaint:Atrial fibrillation, persistent      PROBLEM LIST:  Nonischemic cardiomyopathy:  Echocardiogram, 10/31/2018: EF 25%. Mild MR. RVSP 57 mmHg.  C, 11/16/2018: Normal coronary arteries. Dilated cardiomyopathy with EF 15%. Significantly elevated LVEDP.  Echocardiogram, 02/20/2019: EF 25%. Mild-to-moderate MR.  ICD placement, 04/01/2019, Dr. Day: [employer] VVI ICD, model Dynagen EL ICD VR, serial #[id number].  Echocardiogram, 02/03/20: EF 30%. Mild MR.  Echo, 09/29/2023: EF 36-40%. Left ventricular diastolic function is consistent with (grade II w/high LAP) pseudonormalization. The left atrial cavity is moderate to severely dilated. Left atrial volume is mildly increased. Negative saline test. Normal RVSP.  Carotid duplex, 09/29/2023: Mild plaque within the left carotid bulb causing 50% stenosis or less. No plaque identified in the right carotid bulb, no stenosis seen.  Morbid obesity:  Peak weight 500 lbs.  CVA  9/23/2023, left MCA territory.  Presumed thromboembolic, placed on warfarin  Paroxysmal atrial fibrillation   Asymptomatic, noted on device check.  sleep apnea on CPAP  Incomplete LBBB  Surgeries:  Gastric sleeve  Left knee arthroscopy  Bilateral shoulder  surgery  [place] teeth extracted        No Known Allergies      Current Outpatient Medications:   •  atorvastatin (LIPITOR) 80 MG tablet, Take 1 tablet by mouth Every Night., Disp: 90 tablet, Rfl: 0  •  carvedilol (COREG) 12.5 MG tablet, Take 1 tablet by mouth 2 (Two) Times a Day With Meals., Disp: 180 tablet, Rfl: 3  •  dapagliflozin (FARXIGA) 5 MG tablet tablet, Take 1 tablet by mouth Daily., Disp: 30 tablet, Rfl: 6  •  furosemide (LASIX) 40 MG tablet, Take 1 tablet by mouth Daily., Disp: 30 tablet, Rfl: 0  •  HYDROcodone-acetaminophen (NORCO)  MG per tablet, Take 1 tablet by mouth Every 6 (Six) Hours., Disp: , Rfl:   •  Iron, Ferrous Sulfate, 325 (65 Fe) MG tablet, Take 1 tablet by mouth Daily., Disp: 30 tablet, Rfl: 2  •  omeprazole (priLOSEC) 20 MG capsule, Take 1 capsule by mouth Daily., Disp: , Rfl:   •  sacubitril-valsartan (ENTRESTO)  MG tablet, Take 1 tablet by mouth 2 (Two) Times a Day. Hold if SBP < 100, Disp: 180 tablet, Rfl: 3  •  traMADol (ULTRAM) 50 MG tablet, Take 1 tablet by mouth Every 12 (Twelve) Hours., Disp: , Rfl:   •  warfarin (Coumadin) 5 MG tablet, Take one tablet by mouth daily OR as directed by anticoagulation clinic, Disp: 90 tablet, Rfl: 1        History of Present Illness    Patient is a 54-year-old  male who presents today for follow-up of cardiomyopathy, atrial fibrillation and HFrEF.  Since last being seen patient has been experiencing some shortness of breath with exertion.  No significant chest pain.  Has also been having some recurrent issues with volume overload.  No reported syncope or presyncope.  Had recent chest x-ray consistent with pulmonary vascular congestion.    The following portions of the patient's history were reviewed and updated as appropriate: allergies, current medications, past family history, past medical history, past social history, past surgical history and problem list.      Social History     Tobacco Use   • Smoking status: Never      "Passive exposure: Past   • Smokeless tobacco: Never   Vaping Use   • Vaping status: Never Used   Substance Use Topics   • Alcohol use: Not Currently     Alcohol/week: 5.0 standard drinks of alcohol     Types: 2 Cans of beer, 3 Shots of liquor per week     Comment: this is  what i drink in a year   • Drug use: No         ROS       Objective:   /66 (BP Location: Right arm, Patient Position: Sitting, Cuff Size: Adult)   Pulse 76   Ht 165.1 cm (65\")   Wt (!) 173 kg (381 lb 12.8 oz)   SpO2 96%   BMI 63.53 kg/m²         Vitals reviewed.   Constitutional:       Appearance: Well-developed and not in distress.      Comments: Morbidly obese   Neck:      Vascular: No JVD.      Trachea: No tracheal deviation.   Pulmonary:      Effort: Pulmonary effort is normal.      Breath sounds: Normal breath sounds.   Cardiovascular:      Normal rate. Regular rhythm.      Murmurs: There is no murmur.   Edema:     Peripheral edema absent.   Musculoskeletal:         General: No deformity. Skin:     General: Skin is warm and dry.   Neurological:      Mental Status: Alert and oriented to person, place, and time.         Procedures  Device check:  Normal functioning single-chamber ICD.  Less than 1% ventricularly paced.  Battery voltage 10.5 years.  Underlying rhythm is sinus.  81 high ventricular rates.  EGM shows sinus.        Assessment:   Assessment & Plan      Diagnoses and all orders for this visit:    1. Acute on chronic HFrEF (heart failure with reduced ejection fraction) (Primary), recent volume overload.  On Lasix and Entresto.    2. Essential hypertension, marginal.  On carvedilol.    3. Atrial fibrillation, persistent, stable.  Chronically anticoagulated with warfarin.      Plan:  Given recurrent heart failure symptoms and no ischemic evaluation in 6 years will obtain cardiac PET.  Continue current cardiac medications.  Educated patient regarding as needed use of Lasix, daily weights, and sodium restriction.  Follow-up in " 6 months time with a device check or sooner if needed.       Noy GANT             Dictated utilizing Dragon dictation

## 2024-11-08 NOTE — LETTER
November 8, 2024     No Recipients    Patient: Matt Bueno   YOB: 1970   Date of Visit: 11/8/2024     Dear Stas Rust MD:       Thank you for referring Matt Bueno to me for evaluation. Below are the relevant portions of my assessment and plan of care.    If you have questions, please do not hesitate to call me. I look forward to following Matt along with you.         Sincerely,        ALFREDA Sandoval        CC: No Recipients    Noy Argueta APRN  11/08/24 1106  Incomplete  Subjective:     Encounter Date:11/08/2024    Primary Care Physician: Stas Rust MD      Patient ID: Matt Bueno is a 54 y.o. male.    Chief Complaint:Atrial fibrillation, persistent      PROBLEM LIST:  Nonischemic cardiomyopathy:  Echocardiogram, 10/31/2018: EF 25%. Mild MR. RVSP 57 mmHg.  [facility], 11/16/2018: Normal coronary arteries. Dilated cardiomyopathy with EF 15%. Significantly elevated LVEDP.  Echocardiogram, 02/20/2019: EF 25%. Mild-to-moderate MR.  ICD placement, 04/01/2019, Dr. Day: [employer] VVI ICD, model Dynagen EL ICD VR, serial #[id number].  Echocardiogram, 02/03/20: EF 30%. Mild MR.  Echo, 09/29/2023: EF 36-40%. Left ventricular diastolic function is consistent with (grade II w/high LAP) pseudonormalization. The left atrial cavity is moderate to severely dilated. Left atrial volume is mildly increased. Negative saline test. Normal RVSP.  Carotid duplex, 09/29/2023: Mild plaque within the left carotid bulb causing 50% stenosis or less. No plaque identified in the right carotid bulb, no stenosis seen.  Morbid obesity:  Peak weight 500 lbs.  CVA  9/23/2023, left MCA territory.  Presumed thromboembolic, placed on warfarin  Paroxysmal atrial fibrillation   Asymptomatic, noted on device check.  sleep apnea on CPAP  Incomplete LBBB  Surgeries:  Gastric sleeve  Left knee arthroscopy  Bilateral shoulder surgery  [place] teeth extracted        No Known Allergies      Current  Outpatient Medications:   •  atorvastatin (LIPITOR) 80 MG tablet, Take 1 tablet by mouth Every Night., Disp: 90 tablet, Rfl: 0  •  carvedilol (COREG) 12.5 MG tablet, Take 1 tablet by mouth 2 (Two) Times a Day With Meals., Disp: 180 tablet, Rfl: 3  •  dapagliflozin (FARXIGA) 5 MG tablet tablet, Take 1 tablet by mouth Daily., Disp: 30 tablet, Rfl: 6  •  furosemide (LASIX) 40 MG tablet, Take 1 tablet by mouth Daily., Disp: 30 tablet, Rfl: 0  •  HYDROcodone-acetaminophen (NORCO)  MG per tablet, Take 1 tablet by mouth Every 6 (Six) Hours., Disp: , Rfl:   •  Iron, Ferrous Sulfate, 325 (65 Fe) MG tablet, Take 1 tablet by mouth Daily., Disp: 30 tablet, Rfl: 2  •  omeprazole (priLOSEC) 20 MG capsule, Take 1 capsule by mouth Daily., Disp: , Rfl:   •  sacubitril-valsartan (ENTRESTO)  MG tablet, Take 1 tablet by mouth 2 (Two) Times a Day. Hold if SBP < 100, Disp: 180 tablet, Rfl: 3  •  traMADol (ULTRAM) 50 MG tablet, Take 1 tablet by mouth Every 12 (Twelve) Hours., Disp: , Rfl:   •  warfarin (Coumadin) 5 MG tablet, Take one tablet by mouth daily OR as directed by anticoagulation clinic, Disp: 90 tablet, Rfl: 1        History of Present Illness    Patient is a 54-year-old  male who presents today for follow-up of cardiomyopathy, atrial fibrillation and HFrEF.  Since last being seen patient has been experiencing some shortness of breath with exertion.  No significant chest pain.  Has also been having some recurrent issues with volume overload.  No reported syncope or presyncope.  Had recent chest x-ray consistent with pulmonary vascular congestion.    The following portions of the patient's history were reviewed and updated as appropriate: allergies, current medications, past family history, past medical history, past social history, past surgical history and problem list.      Social History     Tobacco Use   • Smoking status: Never     Passive exposure: Past   • Smokeless tobacco: Never   Vaping Use   •  "Vaping status: Never Used   Substance Use Topics   • Alcohol use: Not Currently     Alcohol/week: 5.0 standard drinks of alcohol     Types: 2 Cans of beer, 3 Shots of liquor per week     Comment: this is  what i drink in a year   • Drug use: No         ROS       Objective:   /66 (BP Location: Right arm, Patient Position: Sitting, Cuff Size: Adult)   Pulse 76   Ht 165.1 cm (65\")   Wt (!) 173 kg (381 lb 12.8 oz)   SpO2 96%   BMI 63.53 kg/m²         Vitals reviewed.   Constitutional:       Appearance: Well-developed and not in distress.      Comments: Morbidly obese   Neck:      Vascular: No JVD.      Trachea: No tracheal deviation.   Pulmonary:      Effort: Pulmonary effort is normal.      Breath sounds: Normal breath sounds.   Cardiovascular:      Normal rate. Regular rhythm.      Murmurs: There is no murmur.   Edema:     Peripheral edema absent.   Musculoskeletal:         General: No deformity. Skin:     General: Skin is warm and dry.   Neurological:      Mental Status: Alert and oriented to person, place, and time.         Procedures  Device check:  Normal functioning single-chamber ICD.  Less than 1% ventricularly paced.  Battery voltage 10.5 years.  Underlying rhythm is sinus.  81 high ventricular rates.  EGM shows sinus.        Assessment:   Assessment & Plan      {Assess/Plan SmartLinks (Optional):81458}  ***       Noy GANT             Dictated utilizing Dragon dictation    Noy Argueta APRN  11/08/24 1029  Sign when Signing Visit  Subjective:     Encounter Date:11/08/2024    Primary Care Physician: Stas Rust MD      Patient ID: Matt Bueno is a 54 y.o. male.    Chief Complaint:Atrial fibrillation, persistent      PROBLEM LIST:  Nonischemic cardiomyopathy:  Echocardiogram, 10/31/2018: EF 25%. Mild MR. RVSP 57 mmHg.  LHC, 11/16/2018: Normal coronary arteries. Dilated cardiomyopathy with EF 15%. Significantly elevated LVEDP.  Echocardiogram, 02/20/2019: EF 25%. " Mild-to-moderate MR.  ICD placement, 04/01/2019, Dr. Day: [employer] VVI ICD, model Dynagen EL ICD VR, serial #[id number].  Echocardiogram, 02/03/20: EF 30%. Mild MR.  Echo, 09/29/2023: EF 36-40%. Left ventricular diastolic function is consistent with (grade II w/high LAP) pseudonormalization. The left atrial cavity is moderate to severely dilated. Left atrial volume is mildly increased. Negative saline test. Normal RVSP.  Carotid duplex, 09/29/2023: Mild plaque within the left carotid bulb causing 50% stenosis or less. No plaque identified in the right carotid bulb, no stenosis seen.  Morbid obesity:  Peak weight 500 lbs.  CVA  9/23/2023, left MCA territory.  Presumed thromboembolic, placed on warfarin  Paroxysmal atrial fibrillation   Asymptomatic, noted on device check.  sleep apnea on CPAP  Incomplete LBBB  Surgeries:  Gastric sleeve  Left knee arthroscopy  Bilateral shoulder surgery  [place] teeth extracted        No Known Allergies      Current Outpatient Medications:   •  atorvastatin (LIPITOR) 80 MG tablet, Take 1 tablet by mouth Every Night., Disp: 90 tablet, Rfl: 0  •  carvedilol (COREG) 12.5 MG tablet, Take 1 tablet by mouth 2 (Two) Times a Day With Meals., Disp: 180 tablet, Rfl: 3  •  dapagliflozin (FARXIGA) 5 MG tablet tablet, Take 1 tablet by mouth Daily., Disp: 30 tablet, Rfl: 6  •  furosemide (LASIX) 40 MG tablet, Take 1 tablet by mouth Daily., Disp: 30 tablet, Rfl: 0  •  HYDROcodone-acetaminophen (NORCO)  MG per tablet, Take 1 tablet by mouth Every 6 (Six) Hours., Disp: , Rfl:   •  Iron, Ferrous Sulfate, 325 (65 Fe) MG tablet, Take 1 tablet by mouth Daily., Disp: 30 tablet, Rfl: 2  •  omeprazole (priLOSEC) 20 MG capsule, Take 1 capsule by mouth Daily., Disp: , Rfl:   •  sacubitril-valsartan (ENTRESTO)  MG tablet, Take 1 tablet by mouth 2 (Two) Times a Day. Hold if SBP < 100, Disp: 180 tablet, Rfl: 3  •  traMADol (ULTRAM) 50 MG tablet, Take 1 tablet by mouth Every 12 (Twelve)  "Hours., Disp: , Rfl:   •  warfarin (Coumadin) 5 MG tablet, Take one tablet by mouth daily OR as directed by anticoagulation clinic, Disp: 90 tablet, Rfl: 1        History of Present Illness    ***    The following portions of the patient's history were reviewed and updated as appropriate: allergies, current medications, past family history, past medical history, past social history, past surgical history and problem list.      Social History     Tobacco Use   • Smoking status: Never     Passive exposure: Past   • Smokeless tobacco: Never   Vaping Use   • Vaping status: Never Used   Substance Use Topics   • Alcohol use: Not Currently     Alcohol/week: 5.0 standard drinks of alcohol     Types: 2 Cans of beer, 3 Shots of liquor per week     Comment: this is  what i drink in a year   • Drug use: No         ROS       Objective:   /66 (BP Location: Right arm, Patient Position: Sitting, Cuff Size: Adult)   Pulse 76   Ht 165.1 cm (65\")   Wt (!) 173 kg (381 lb 12.8 oz)   SpO2 96%   BMI 63.53 kg/m²         Physical Exam    Procedures  Device check:  ***        Assessment:   Assessment & Plan     {Assess/Plan SmartLinks (Optional):49933}  ***       Noy GANT     Advance Care Planning  {Advance Directive Status:39930}        Dictated utilizing Dragon dictation   "

## 2024-11-09 DIAGNOSIS — R60.0 BILATERAL LOWER EXTREMITY EDEMA: ICD-10-CM

## 2024-11-09 DIAGNOSIS — I42.0 DILATED CARDIOMYOPATHY: ICD-10-CM

## 2024-11-13 NOTE — TELEPHONE ENCOUNTER
I sent a short course in January. Long term use of lasix would need to be managed by his cardiologist or PCP

## 2024-11-15 RX ORDER — FUROSEMIDE 40 MG/1
40 TABLET ORAL DAILY
Qty: 30 TABLET | Refills: 0 | OUTPATIENT
Start: 2024-11-15

## 2025-01-07 ENCOUNTER — TELEPHONE (OUTPATIENT)
Dept: PHARMACY | Facility: HOSPITAL | Age: 55
End: 2025-01-07

## 2025-01-07 NOTE — TELEPHONE ENCOUNTER
Called patient and informed him he is overdue for an INR check.     Maribell Bond CPhT   11:28 EST 1/7/2025

## 2025-01-16 ENCOUNTER — HOSPITAL ENCOUNTER (OUTPATIENT)
Dept: PULMONOLOGY | Facility: HOSPITAL | Age: 55
Discharge: HOME | End: 2025-01-16
Admitting: FAMILY MEDICINE
Payer: MEDICARE

## 2025-01-16 DIAGNOSIS — R06.09 DYSPNEA ON EXERTION: ICD-10-CM

## 2025-01-16 PROCEDURE — 94729 DIFFUSING CAPACITY: CPT

## 2025-01-16 PROCEDURE — 94060 EVALUATION OF WHEEZING: CPT

## 2025-01-16 PROCEDURE — 94726 PLETHYSMOGRAPHY LUNG VOLUMES: CPT

## 2025-01-16 RX ORDER — ALBUTEROL SULFATE 0.83 MG/ML
2.5 SOLUTION RESPIRATORY (INHALATION) ONCE
Status: COMPLETED | OUTPATIENT
Start: 2025-01-16 | End: 2025-01-16

## 2025-01-16 RX ADMIN — ALBUTEROL SULFATE 2.5 MG: 2.5 SOLUTION RESPIRATORY (INHALATION) at 14:22

## 2025-01-24 ENCOUNTER — TELEPHONE (OUTPATIENT)
Dept: PHARMACY | Facility: HOSPITAL | Age: 55
End: 2025-01-24

## 2025-01-24 NOTE — TELEPHONE ENCOUNTER
Called patient's wife's number, informing patient he is overdue for an INR check.     Maribell Bond CPhT   11:52 EST 1/24/2025

## 2025-02-04 ENCOUNTER — HOSPITAL ENCOUNTER (OUTPATIENT)
Dept: CARDIOLOGY | Facility: HOSPITAL | Age: 55
Discharge: HOME | End: 2025-02-04
Admitting: NURSE PRACTITIONER
Payer: MEDICARE

## 2025-02-04 VITALS — OXYGEN SATURATION: 96 % | SYSTOLIC BLOOD PRESSURE: 117 MMHG | HEART RATE: 92 BPM | DIASTOLIC BLOOD PRESSURE: 67 MMHG

## 2025-02-04 DIAGNOSIS — I50.23 ACUTE ON CHRONIC HFREF (HEART FAILURE WITH REDUCED EJECTION FRACTION): ICD-10-CM

## 2025-02-04 PROCEDURE — 93017 CV STRESS TEST TRACING ONLY: CPT

## 2025-02-04 PROCEDURE — 34310000005 RUBIDIUM CHLORIDE: Performed by: NURSE PRACTITIONER

## 2025-02-04 PROCEDURE — A9555 RB82 RUBIDIUM: HCPCS | Performed by: NURSE PRACTITIONER

## 2025-02-04 PROCEDURE — 78431 MYOCRD IMG PET RST&STRS CT: CPT

## 2025-02-04 PROCEDURE — 25010000002 REGADENOSON 0.4 MG/5ML SOLUTION: Performed by: NURSE PRACTITIONER

## 2025-02-04 RX ORDER — REGADENOSON 0.08 MG/ML
0.4 INJECTION, SOLUTION INTRAVENOUS ONCE
Status: COMPLETED | OUTPATIENT
Start: 2025-02-04 | End: 2025-02-04

## 2025-02-04 RX ADMIN — RUBIDIUM CHLORIDE RB-82 1 DOSE: 150 INJECTION, SOLUTION INTRAVENOUS at 10:30

## 2025-02-04 RX ADMIN — REGADENOSON 0.4 MG: 0.08 INJECTION, SOLUTION INTRAVENOUS at 10:31

## 2025-02-04 RX ADMIN — RUBIDIUM CHLORIDE RB-82 1 DOSE: 150 INJECTION, SOLUTION INTRAVENOUS at 10:19

## 2025-02-06 LAB
BH CV REST NUCLEAR ISOTOPE DOSE: 35 MCI
BH CV STRESS BP STAGE 2: NORMAL
BH CV STRESS BP STAGE 4: NORMAL
BH CV STRESS COMMENTS STAGE 1: NORMAL
BH CV STRESS DOSE REGADENOSON STAGE 1: 0.4
BH CV STRESS DURATION MIN STAGE 1: 1
BH CV STRESS DURATION MIN STAGE 2: 1
BH CV STRESS DURATION MIN STAGE 3: 1
BH CV STRESS DURATION MIN STAGE 4: 1
BH CV STRESS DURATION SEC STAGE 1: 0
BH CV STRESS DURATION SEC STAGE 2: 0
BH CV STRESS DURATION SEC STAGE 3: 0
BH CV STRESS DURATION SEC STAGE 4: 0
BH CV STRESS HR STAGE 1: 100
BH CV STRESS HR STAGE 2: 88
BH CV STRESS HR STAGE 3: 89
BH CV STRESS HR STAGE 4: 88
BH CV STRESS NUCLEAR ISOTOPE DOSE: 35 MCI
BH CV STRESS O2 STAGE 1: 95
BH CV STRESS O2 STAGE 2: 98
BH CV STRESS O2 STAGE 3: 97
BH CV STRESS O2 STAGE 4: 95
BH CV STRESS PROTOCOL 1: NORMAL
BH CV STRESS RECOVERY BP: NORMAL MMHG
BH CV STRESS RECOVERY HR: 83 BPM
BH CV STRESS RECOVERY O2: 95 %
BH CV STRESS STAGE 1: 1
BH CV STRESS STAGE 2: 2
BH CV STRESS STAGE 3: 3
BH CV STRESS STAGE 4: 4
MAXIMAL PREDICTED HEART RATE: 164 BPM
PERCENT MAX PREDICTED HR: 60.98 %
STRESS BASELINE BP: NORMAL MMHG
STRESS BASELINE HR: 92 BPM
STRESS O2 SAT REST: 96 %
STRESS PERCENT HR: 72 %
STRESS POST ESTIMATED WORKLOAD: 1 METS
STRESS POST EXERCISE DUR MIN: 4 MIN
STRESS POST EXERCISE DUR SEC: 0 SEC
STRESS POST O2 SAT PEAK: 95 %
STRESS POST PEAK BP: NORMAL MMHG
STRESS POST PEAK HR: 100 BPM
STRESS TARGET HR: 139 BPM

## 2025-02-06 RX ORDER — WARFARIN SODIUM 5 MG/1
TABLET ORAL
Qty: 90 TABLET | Refills: 0 | Status: SHIPPED | OUTPATIENT
Start: 2025-02-06

## 2025-02-10 ENCOUNTER — TELEPHONE (OUTPATIENT)
Dept: FAMILY MEDICINE CLINIC | Facility: CLINIC | Age: 55
End: 2025-02-10
Payer: MEDICARE

## 2025-02-10 NOTE — TELEPHONE ENCOUNTER
Caller: Matt Bueno    Relationship: Self    Best call back number:   Telephone Information:   Mobile [phone]     Caller requesting test results: PATIENT    What test was performed: STRESS TEST    When was the test performed: UNKNOWN    Where was the test performed:     Additional notes: PATIENT CALLED IN REQUESTING A CALLBACK PER PCP TO DISCUSS RESULTS OF HIS STRESS TEST

## 2025-02-12 ENCOUNTER — TELEPHONE (OUTPATIENT)
Dept: FAMILY MEDICINE CLINIC | Facility: CLINIC | Age: 55
End: 2025-02-12
Payer: MEDICARE

## 2025-02-12 DIAGNOSIS — J43.1 PANLOBULAR EMPHYSEMA: Primary | ICD-10-CM

## 2025-02-12 NOTE — TELEPHONE ENCOUNTER
Caller: Lidakimberleydaniela Matt    Relationship: Self    Best call back number: [phone]     Caller requesting test results: SELF    What test was performed: CARDIOVASCULAR BREATHING TEST- HE WAS PUT IN A MACHINE AND HAD TO BREATH AND HOLD HIS BREATH WHEN INSTRUCTED.    When was the test performed: 2 WEEKS AGO    Where was the test performed: [facility] ON [address]    Additional notes: PLEASE CALL WITH THE RESULTS.

## 2025-02-14 ENCOUNTER — TELEPHONE (OUTPATIENT)
Dept: FAMILY MEDICINE CLINIC | Facility: CLINIC | Age: 55
End: 2025-02-14
Payer: MEDICARE

## 2025-02-14 DIAGNOSIS — J43.1 PANLOBULAR EMPHYSEMA: Primary | ICD-10-CM

## 2025-02-14 RX ORDER — UMECLIDINIUM BROMIDE AND VILANTEROL TRIFENATATE 62.5; 25 UG/1; UG/1
1 POWDER RESPIRATORY (INHALATION)
Qty: 1 EACH | Refills: 3 | Status: SHIPPED | OUTPATIENT
Start: 2025-02-14 | End: 2025-02-17 | Stop reason: CLARIF

## 2025-02-14 NOTE — TELEPHONE ENCOUNTER
Provider: DR BEATTY    Caller: Matt Bueno    Relationship to Patient: Self    Phone Number: [phone]     Reason for Call: PATIENT STATED THAT THE NEW INHALER IS GOING TO COST HIM OVER 500.00. AND HE WOULD LIKE TO KNOW IF THERE IS ANY PROGRAM THAT HE CAN BE PLACED ON OR IF ANYTHING CAN DONE.

## 2025-02-14 NOTE — TELEPHONE ENCOUNTER
Moderate obstructive lung disease.  No sig change with bronchodilator.    Let's try anoro, a once a day inhaler to see if this helps!  Med sent in

## 2025-02-15 ENCOUNTER — PATIENT MESSAGE (OUTPATIENT)
Dept: FAMILY MEDICINE CLINIC | Facility: CLINIC | Age: 55
End: 2025-02-15
Payer: MEDICARE

## 2025-02-17 ENCOUNTER — TELEPHONE (OUTPATIENT)
Dept: PHARMACY | Facility: HOSPITAL | Age: 55
End: 2025-02-17

## 2025-02-17 NOTE — TELEPHONE ENCOUNTER
Overdue INR Reminder Documentation    Attempted to call the patient for an INR reminder.     This is the Second attempt. . Will Call again in 3 days. Letter tracking number (if applicable): N/A       Maribell Bond CPhT   14:49 EST 2/17/2025

## 2025-02-17 NOTE — TELEPHONE ENCOUNTER
In know inhalers can be expensive, I am not sure how much each one is with various insurances.    I will send something else in to try to find affordable option!

## 2025-02-20 NOTE — TELEPHONE ENCOUNTER
Pt failed to mention, he doesn't have prescription coverage. Asking for the least inexpensive inhaler.     Pharmacy is using discount cards but out of pocket is still very expensive. Spiriva is $550.

## 2025-02-21 NOTE — TELEPHONE ENCOUNTER
Oh, that is a problem. He should ask pharmacist what the cheapest inhaler is, but I think they are all over a couple hundred dollars!  Let me know what pharmacist recommends.

## 2025-02-25 ENCOUNTER — TELEPHONE (OUTPATIENT)
Dept: PHARMACY | Facility: HOSPITAL | Age: 55
End: 2025-02-25

## 2025-02-25 NOTE — TELEPHONE ENCOUNTER
Overdue INR Reminder Documentation    Attempted to call the patient for an INR reminder.     This is the Second attempt. . Will Call again in 3 days. Letter tracking number (if applicable): N/A      Maribell Bond CPhT   15:42 EST 2/25/2025

## 2025-03-18 ENCOUNTER — TELEPHONE (OUTPATIENT)
Dept: CARDIOLOGY | Facility: CLINIC | Age: 55
End: 2025-03-18

## 2025-03-18 NOTE — TELEPHONE ENCOUNTER
Name: Matt Bueno    Relationship: Self    Best Callback Number: [phone]     Incoming call to the Hub, requesting to  Reschedule their Other: [facility] 6 MO FU  appointment on 05/05/25.     Per Hub workflow, further review is needed before the task can be completed.    Result of Call: Please reach out to the patient to reschedule

## 2025-03-18 NOTE — TELEPHONE ENCOUNTER
Caller: Matt Bueno    Relationship: Self    Best call back number: [phone]     What is the best time to reach you: ANYTIME    Who are you requesting to speak with (clinical staff, provider,  specific staff member): ANYONE    What was the call regarding:  PT IS REQUESTING FOR  TO SEND THE COMPANY [employer] A SIGNED COPY OF THE FINANCIAL FORM FOR THE MEDICATION ENTRESTO.     Is it okay if the provider responds through [employer]:  YES

## 2025-03-21 ENCOUNTER — TELEPHONE (OUTPATIENT)
Dept: PHARMACY | Facility: HOSPITAL | Age: 55
End: 2025-03-21

## 2025-03-21 RX ORDER — SACUBITRIL AND VALSARTAN 97; 103 MG/1; MG/1
1 TABLET, FILM COATED ORAL 2 TIMES DAILY
Qty: 180 TABLET | Refills: 3 | Status: SHIPPED | OUTPATIENT
Start: 2025-03-21

## 2025-03-21 NOTE — TELEPHONE ENCOUNTER
Called patient and discussed that he is very overdue for an INR check. Patient confirms he is still taking warfarin, and says he will go to [facility] on Monday or Tuesday to have it checked. Will follow up to ensure patient gets tested.    Maribell Bond CPhT   10:55 EDT   3/21/2025

## 2025-03-25 ENCOUNTER — TELEPHONE (OUTPATIENT)
Dept: PHARMACY | Facility: HOSPITAL | Age: 55
End: 2025-03-25

## 2025-03-25 NOTE — TELEPHONE ENCOUNTER
Overdue INR Reminder Documentation    Attempted to call the patient for an INR reminder.     This is the First attempt. . Will call patient again in 3 days.     Maribell Bond CPhT, [facility]   16:10 EDT   3/25/2025

## 2025-03-28 ENCOUNTER — TELEPHONE (OUTPATIENT)
Dept: PHARMACY | Facility: HOSPITAL | Age: 55
End: 2025-03-28

## 2025-03-28 NOTE — TELEPHONE ENCOUNTER
Overdue INR Reminder Documentation    Attempted to call the patient for an INR reminder.     This is the Second Attempt. Will call again in 3 days.     Maribell Bond CPhT, [facility]   12:06 EDT   3/28/2025

## 2025-04-02 ENCOUNTER — TELEPHONE (OUTPATIENT)
Dept: PHARMACY | Facility: HOSPITAL | Age: 55
End: 2025-04-02

## 2025-04-02 NOTE — TELEPHONE ENCOUNTER
Overdue INR Reminder Documentation    Attempted to call the patient for an INR reminder.     This is the 3rd attempt. Sending 1st Overdue Letter.     Maribell Bond CPhT, [facility]   10:59 EDT   4/2/2025

## 2025-04-16 ENCOUNTER — TELEPHONE (OUTPATIENT)
Dept: PHARMACY | Facility: HOSPITAL | Age: 55
End: 2025-04-16

## 2025-04-16 NOTE — TELEPHONE ENCOUNTER
Discussed with patient he is overdue for an INR check. Patient says he plans on going to have it checked this week.    Maribell Bond CPhT, [facility]   16:08 EDT   4/16/2025

## 2025-04-30 ENCOUNTER — OFFICE VISIT (OUTPATIENT)
Dept: FAMILY MEDICINE CLINIC | Facility: CLINIC | Age: 55
End: 2025-04-30
Payer: MEDICARE

## 2025-04-30 VITALS
DIASTOLIC BLOOD PRESSURE: 82 MMHG | RESPIRATION RATE: 18 BRPM | OXYGEN SATURATION: 97 % | BODY MASS INDEX: 52.48 KG/M2 | HEART RATE: 94 BPM | TEMPERATURE: 97.4 F | SYSTOLIC BLOOD PRESSURE: 128 MMHG | WEIGHT: 315 LBS | HEIGHT: 65 IN

## 2025-04-30 DIAGNOSIS — J43.1 PANLOBULAR EMPHYSEMA: Primary | ICD-10-CM

## 2025-04-30 DIAGNOSIS — Z79.01 ANTICOAGULATED ON COUMADIN: ICD-10-CM

## 2025-04-30 DIAGNOSIS — I42.0 DILATED CARDIOMYOPATHY: ICD-10-CM

## 2025-04-30 DIAGNOSIS — R60.0 BILATERAL LOWER EXTREMITY EDEMA: ICD-10-CM

## 2025-04-30 DIAGNOSIS — E78.00 ELEVATED CHOLESTEROL: ICD-10-CM

## 2025-04-30 DIAGNOSIS — I50.22 CHRONIC HFREF (HEART FAILURE WITH REDUCED EJECTION FRACTION): ICD-10-CM

## 2025-04-30 DIAGNOSIS — E66.01 MORBID OBESITY: ICD-10-CM

## 2025-04-30 DIAGNOSIS — R06.09 DYSPNEA ON EXERTION: ICD-10-CM

## 2025-04-30 DIAGNOSIS — R19.00 ABDOMINAL SWELLING: ICD-10-CM

## 2025-04-30 DIAGNOSIS — G47.33 OSA ON CPAP: ICD-10-CM

## 2025-04-30 DIAGNOSIS — Z74.09 IMPAIRED MOBILITY AND ACTIVITIES OF DAILY LIVING: ICD-10-CM

## 2025-04-30 DIAGNOSIS — Z78.9 IMPAIRED MOBILITY AND ACTIVITIES OF DAILY LIVING: ICD-10-CM

## 2025-04-30 RX ORDER — IPRATROPIUM BROMIDE AND ALBUTEROL SULFATE 2.5; .5 MG/3ML; MG/3ML
3 SOLUTION RESPIRATORY (INHALATION) EVERY 4 HOURS PRN
Qty: 360 ML | Refills: 1 | Status: SHIPPED | OUTPATIENT
Start: 2025-04-30

## 2025-04-30 RX ORDER — SEMAGLUTIDE 0.25 MG/.5ML
0.25 INJECTION, SOLUTION SUBCUTANEOUS WEEKLY
Qty: 2 ML | Refills: 1 | Status: SHIPPED | OUTPATIENT
Start: 2025-04-30 | End: 2025-05-05

## 2025-04-30 RX ORDER — FUROSEMIDE 40 MG/1
40 TABLET ORAL DAILY
Qty: 90 TABLET | Refills: 1 | Status: SHIPPED | OUTPATIENT
Start: 2025-04-30

## 2025-04-30 NOTE — PROGRESS NOTES
Subjective   Matt Bueno is a 54 y.o. male.     History of Present Illness     His breathing has been worse '  he gets OOB very easily  He cannot afford the tiotropium  Cannot walk hardly at all due to his SOA  Cost of anoro was 500 but he thinks [facility] would be 70        HE NOTES HIS ABDOMNE HAS BEEN SWELLING MORE AND MORE OFTEN  THERE ARE HARD FIRM AREAS along where the swellin is  This is over the last 30 days      He struggles to be mobile within the house  He asks if a scooter could help with transport  Hard to get around in stores as well      The following portions of the patient's history were reviewed and updated as appropriate: allergies, current medications, past family history, past medical history, past social history, past surgical history, and problem list.    Review of Systems   Constitutional: Negative.    Respiratory:  Positive for shortness of breath.    Psychiatric/Behavioral: Negative.         Objective   Physical Exam  Vitals and nursing note reviewed.   Constitutional:       General: He is not in acute distress.     Appearance: Normal appearance. He is well-developed. He is obese.   Cardiovascular:      Rate and Rhythm: Normal rate and regular rhythm.      Heart sounds: Normal heart sounds.   Pulmonary:      Effort: Pulmonary effort is normal.      Breath sounds: Normal breath sounds.   Musculoskeletal:      Comments: Using rollator walker to slowly ambulate   Neurological:      Mental Status: He is alert and oriented to person, place, and time.   Psychiatric:         Mood and Affect: Mood normal.         Behavior: Behavior normal.         Thought Content: Thought content normal.         Judgment: Judgment normal.         Assessment & Plan   Diagnoses and all orders for this visit:    1. Panlobular emphysema (Primary)  -     tiotropium bromide monohydrate (SPIRIVA RESPIMAT) 2.5 MCG/ACT aerosol solution inhaler; Inhale 2 puffs Daily.  Dispense: 1 each; Refill: 5  -      ipratropium-albuterol (DUO-NEB) 0.5-2.5 mg/3 ml nebulizer; Take 3 mL by nebulization Every 4 (Four) Hours As Needed for Wheezing.  Dispense: 360 mL; Refill: 1    2. Dilated cardiomyopathy  -     furosemide (LASIX) 40 MG tablet; Take 1 tablet by mouth Daily.  Dispense: 90 tablet; Refill: 1  -     CBC & Differential  -     Lipid Panel    3. Bilateral lower extremity edema  -     furosemide (LASIX) 40 MG tablet; Take 1 tablet by mouth Daily.  Dispense: 90 tablet; Refill: 1  -     Comprehensive Metabolic Panel    4. Abdominal swelling  -     US Abdomen Complete; Future    5. POOJA on CPAP  -     Semaglutide-Weight Management (Wegovy) 0.25 MG/0.5ML solution auto-injector; Inject 0.5 mL under the skin into the appropriate area as directed 1 (One) Time Per Week.  Dispense: 2 mL; Refill: 1    6. Chronic HFrEF (heart failure with reduced ejection fraction)  -     proBNP  -     Semaglutide-Weight Management (Wegovy) 0.25 MG/0.5ML solution auto-injector; Inject 0.5 mL under the skin into the appropriate area as directed 1 (One) Time Per Week.  Dispense: 2 mL; Refill: 1    7. Dyspnea on exertion  -     CBC & Differential    8. Anticoagulated on Coumadin  -     Protime-INR    9. Elevated cholesterol  -     Lipid Panel    10. Impaired mobility and activities of daily living    11. Morbid obesity  -     Semaglutide-Weight Management (Wegovy) 0.25 MG/0.5ML solution auto-injector; Inject 0.5 mL under the skin into the appropriate area as directed 1 (One) Time Per Week.  Dispense: 2 mL; Refill: 1    Will work on anoro as this is cheaper at [place].  Will use duoneb QID as well  I do believe his BMI of 70 plays a major role in his health issues.  Will see if wegovyu can be covered  His mobility continues to be lmited.  Will send order for eval for motorized scooter  Abdominal swelling, will check US to evaluate this and continue PRN lasix.  Recheck labs and f/u pending results

## 2025-05-01 ENCOUNTER — ANTICOAGULATION VISIT (OUTPATIENT)
Dept: PHARMACY | Facility: HOSPITAL | Age: 55
End: 2025-05-01
Payer: MEDICARE

## 2025-05-01 DIAGNOSIS — Z79.01 WARFARIN ANTICOAGULATION: Primary | ICD-10-CM

## 2025-05-01 LAB
ALBUMIN SERPL-MCNC: 3.9 G/DL (ref 3.8–4.9)
ALP SERPL-CCNC: 91 IU/L (ref 44–121)
ALT SERPL-CCNC: 15 IU/L (ref 0–44)
AST SERPL-CCNC: 13 IU/L (ref 0–40)
BASOPHILS # BLD AUTO: 0.1 X10E3/UL (ref 0–0.2)
BASOPHILS NFR BLD AUTO: 1 %
BILIRUB SERPL-MCNC: 0.8 MG/DL (ref 0–1.2)
BUN SERPL-MCNC: 19 MG/DL (ref 6–24)
BUN/CREAT SERPL: 19 (ref 9–20)
CALCIUM SERPL-MCNC: 9 MG/DL (ref 8.7–10.2)
CHLORIDE SERPL-SCNC: 105 MMOL/L (ref 96–106)
CHOLEST SERPL-MCNC: 120 MG/DL (ref 100–199)
CO2 SERPL-SCNC: 24 MMOL/L (ref 20–29)
CREAT SERPL-MCNC: 1.01 MG/DL (ref 0.76–1.27)
EGFRCR SERPLBLD CKD-EPI 2021: 87 ML/MIN/1.73
EOSINOPHIL # BLD AUTO: 0.4 X10E3/UL (ref 0–0.4)
EOSINOPHIL NFR BLD AUTO: 4 %
ERYTHROCYTE [DISTWIDTH] IN BLOOD BY AUTOMATED COUNT: 15.7 % (ref 11.6–15.4)
GLOBULIN SER CALC-MCNC: 2.6 G/DL (ref 1.5–4.5)
GLUCOSE SERPL-MCNC: 128 MG/DL (ref 70–99)
HCT VFR BLD AUTO: 35.8 % (ref 37.5–51)
HDLC SERPL-MCNC: 30 MG/DL
HGB BLD-MCNC: 10.3 G/DL (ref 13–17.7)
IMM GRANULOCYTES # BLD AUTO: 0 X10E3/UL (ref 0–0.1)
IMM GRANULOCYTES NFR BLD AUTO: 0 %
INR PPP: 1.8 (ref 0.9–1.2)
LDLC SERPL CALC-MCNC: 75 MG/DL (ref 0–99)
LYMPHOCYTES # BLD AUTO: 1.2 X10E3/UL (ref 0.7–3.1)
LYMPHOCYTES NFR BLD AUTO: 14 %
MCH RBC QN AUTO: 22.4 PG (ref 26.6–33)
MCHC RBC AUTO-ENTMCNC: 28.8 G/DL (ref 31.5–35.7)
MCV RBC AUTO: 78 FL (ref 79–97)
MONOCYTES # BLD AUTO: 1.1 X10E3/UL (ref 0.1–0.9)
MONOCYTES NFR BLD AUTO: 12 %
NEUTROPHILS # BLD AUTO: 6.3 X10E3/UL (ref 1.4–7)
NEUTROPHILS NFR BLD AUTO: 69 %
NT-PROBNP SERPL-MCNC: 2254 PG/ML (ref 0–210)
PLATELET # BLD AUTO: 357 X10E3/UL (ref 150–450)
POTASSIUM SERPL-SCNC: 4.9 MMOL/L (ref 3.5–5.2)
PROT SERPL-MCNC: 6.5 G/DL (ref 6–8.5)
PROTHROMBIN TIME: 19 SEC (ref 9.1–12)
RBC # BLD AUTO: 4.59 X10E6/UL (ref 4.14–5.8)
SODIUM SERPL-SCNC: 144 MMOL/L (ref 134–144)
TRIGL SERPL-MCNC: 75 MG/DL (ref 0–149)
VLDLC SERPL CALC-MCNC: 15 MG/DL (ref 5–40)
WBC # BLD AUTO: 9.1 X10E3/UL (ref 3.4–10.8)

## 2025-05-01 NOTE — PROGRESS NOTES
Anticoagulation Clinic Progress Note  Indication: Atrial Fibrillation, Hx of CVA (09/2023)  Referring Provider: Matt Hernandez MD  Initial Warfarin Start Date: 09/2023  Planned Duration of Therapy: Lifelong   Goal INR: 2-3  Current Drug Interactions: Norco, tramadol, furosemide   [id number]: 4 (CHF, HTN, hx of CVA)  Bleed Risk: 2--No history of bleeding     Diet: Normally avoids (1/31/24), eats more GLV in the summer months about 2-3 weekly  Alcohol: None   Tobacco: No   OTC Pain Medication: Acetaminophen PRN     Anticoagulation Clinic INR History:  Date 1/31 2/2 2/6 2/9  2/14 2/20 2/26 3/6 3/26 4/29 5/8 5/15 5/31 7/16 10/25    Total Weekly Dose 70 mg 50 mg 10 mg 15 mg 35 mg 35 mg 30 mg 35 mg  35 mg 35 mg 35 mg 35 mg 35 mg 35 mg 35 mg NON-  COMPLIANT   INR 8.0 5.36   > 8 POCT 1.56 1.9 2.26 2.40 2.22 2.55 2.98 3.1 1.91 3.31 2.63 2.2 2.4    Notes  Hold x2 Hold x6 missx1 zpak  Miss x1 Unable to reach until 3/11   Rec'd 5/9 No GLV   Rec'd 10/28 unable to reach       Date 4/30                  Total Weekly Dose 25 mg                   INR 1.8                  Notes                       Clinic Interview:  Tablet Strength: 10 mg and 5 mg tablets  Estimated OOP cost: [please send to registration if not already done]  Verbal Release Authorization signed on 2/2/24 -- may speak with wife Julienne Bueno  Contact information: [phone] (mobile)      Patient Findings:  Positives: Missed doses, Change in medications   Negatives: Signs/symptoms of thrombosis, Signs/symptoms of bleeding, Laboratory test error suspected, Change in health, Change in alcohol use, Change in activity, Upcoming invasive procedure, Emergency department visit, Upcoming dental procedure, Extra doses, Change in diet/appetite, Hospital admission, Bruising, Other complaints   Comments: Missed x 2 doses last week. Reviewed med list with patient, no new medications but has discontinued a couple of medications due to cost.  All other findings negative.        Plan:  1. INR was subtherapeutic 4/30 at 1.8 (goal INR 2-3). Instructed Mr. Bueno to continue warfarin 5mg oral daily until recheck. Clinic was unable to reach patient last week for assessment, he has taken his usual dose of warfarin 5 mg daily x 1 week. He had missed 2 doses the week prior to last INR check, INR has likely self-resolved.   2. Recheck INR in 2 weeks 5/15/25  3. Verbal information provided over the phone. Matt Bueno expresses understanding by teach back and has no further questions at this time.    Demetrice Weir, PharmD   5/5/2025  10:05 EDT

## 2025-05-02 PROBLEM — I10 ESSENTIAL HYPERTENSION: Status: ACTIVE | Noted: 2025-05-02

## 2025-05-02 PROBLEM — I50.23 ACUTE ON CHRONIC HFREF (HEART FAILURE WITH REDUCED EJECTION FRACTION): Status: ACTIVE | Noted: 2023-09-30

## 2025-05-02 PROBLEM — I48.19 ATRIAL FIBRILLATION, PERSISTENT: Status: ACTIVE | Noted: 2025-05-02

## 2025-05-08 ENCOUNTER — TELEPHONE (OUTPATIENT)
Dept: FAMILY MEDICINE CLINIC | Facility: CLINIC | Age: 55
End: 2025-05-08
Payer: MEDICARE

## 2025-05-08 NOTE — TELEPHONE ENCOUNTER
Caller: Matt Bueno     Relationship: PATIENT    Best call back number: [phone]     What is your medical concern? MORE ABDOMEN SWELLING AND PAIN.  CAN'T GET IN UNTIL 7/10/25 AT [facility].  PLEASE DO NEW ORDER FOR AN UTLRA SOUND TO BE DONE IN [place] BY YOUR OFFICE WHERE HE'S HAD X-RAYS BEFORE.    THANK YOU.

## 2025-05-09 ENCOUNTER — OFFICE VISIT (OUTPATIENT)
Dept: FAMILY MEDICINE CLINIC | Facility: CLINIC | Age: 55
End: 2025-05-09
Payer: MEDICARE

## 2025-05-09 VITALS
HEART RATE: 56 BPM | RESPIRATION RATE: 18 BRPM | BODY MASS INDEX: 52.48 KG/M2 | HEIGHT: 65 IN | OXYGEN SATURATION: 98 % | WEIGHT: 315 LBS | TEMPERATURE: 97.4 F | SYSTOLIC BLOOD PRESSURE: 108 MMHG | DIASTOLIC BLOOD PRESSURE: 76 MMHG

## 2025-05-09 DIAGNOSIS — K08.89 DENTALGIA: Primary | ICD-10-CM

## 2025-05-09 NOTE — PROGRESS NOTES
Subjective   Matt Bueno is a 54 y.o. male.     Dental Pain          He has had pain in his left jaw  This is similar to when he had a bad infection in his R lower jaw in the past  Started about 1 week ago  Has dental appointment but not for a bit of time!    The following portions of the patient's history were reviewed and updated as appropriate: allergies, current medications, past family history, past medical history, past social history, past surgical history, and problem list.    Review of Systems    Objective   Physical Exam  Vitals and nursing note reviewed.   Constitutional:       General: He is not in acute distress.     Appearance: Normal appearance. He is well-developed.   HENT:      Mouth/Throat:     Cardiovascular:      Rate and Rhythm: Normal rate and regular rhythm.      Heart sounds: Normal heart sounds.   Pulmonary:      Effort: Pulmonary effort is normal.      Breath sounds: Normal breath sounds.   Neurological:      Mental Status: He is alert and oriented to person, place, and time.   Psychiatric:         Mood and Affect: Mood normal.         Behavior: Behavior normal.         Thought Content: Thought content normal.         Judgment: Judgment normal.         Assessment & Plan   Diagnoses and all orders for this visit:    1. Dentalgia (Primary)  -     amoxicillin-clavulanate (AUGMENTIN) 875-125 MG per tablet; Take 1 tablet by mouth 2 (Two) Times a Day.  Dispense: 20 tablet; Refill: 0      Will get antibitoics started but pt needs to see dentist!  He is workingon appointment  Augmentin BID 10 days, salt water gargles and f/u with dentist

## 2025-05-12 ENCOUNTER — TELEPHONE (OUTPATIENT)
Dept: FAMILY MEDICINE CLINIC | Facility: CLINIC | Age: 55
End: 2025-05-12
Payer: MEDICARE

## 2025-05-12 DIAGNOSIS — R19.00 ABDOMINAL SWELLING: Primary | ICD-10-CM

## 2025-05-12 NOTE — TELEPHONE ENCOUNTER
Caller: Matt Bueno    Relationship: Self    Best call back number: [phone]    What is the best time to reach you: ANYTIME     Who are you requesting to speak with (clinical staff, provider,  specific staff member): CLINICAL STAFF     PATIENT STATES THAT HIS ORDER FOR A ABDOMINAL ULTRASOUND CAN'T BE SCHEDULED UNTIL AUGUST. PATIENT IS WANTING TO KNOW WHAT HE CAN DO TO GET IT DONE THIS WEEK OR ASAP. PLEASE CALL TO DISCUSS. PATIENT STATES ITS HARD FOR HIM TO BREATH PUT PANTS FROM THE SWELLING.

## 2025-05-13 ENCOUNTER — HOSPITAL ENCOUNTER (OUTPATIENT)
Dept: ULTRASOUND IMAGING | Facility: HOSPITAL | Age: 55
Discharge: HOME | End: 2025-05-13
Admitting: FAMILY MEDICINE
Payer: MEDICARE

## 2025-05-13 DIAGNOSIS — R19.00 ABDOMINAL SWELLING: ICD-10-CM

## 2025-05-13 PROCEDURE — 76700 US EXAM ABDOM COMPLETE: CPT

## 2025-05-15 ENCOUNTER — TELEPHONE (OUTPATIENT)
Dept: FAMILY MEDICINE CLINIC | Facility: CLINIC | Age: 55
End: 2025-05-15
Payer: MEDICARE

## 2025-05-15 NOTE — TELEPHONE ENCOUNTER
PATIENT HAS CALLED REQUESTING A CALL BACK WITH HIS ULTRASOUND RESULTS.    CALL BACK NUMBER [phone]

## 2025-05-16 NOTE — TELEPHONE ENCOUNTER
PATIENT IS CALLING TO FOLLOW UP ON THIS REQUEST AS WELL AS SOMETHING TO RELIEVE THE PRESSURE.    PATIENT ALSO HAD TO STOP TAKING THE ANTIBIOTIC BECAUSE HE WAS BLEEDING.

## 2025-06-05 ENCOUNTER — TELEPHONE (OUTPATIENT)
Dept: FAMILY MEDICINE CLINIC | Facility: CLINIC | Age: 55
End: 2025-06-05
Payer: MEDICARE

## 2025-06-05 NOTE — TELEPHONE ENCOUNTER
Caller: [facility]    Relationship: Other    Best call back number: [phone]     What form or medical record are you requesting: 3 MOST RECENT CHART NOTES AND A DEMOGRAPHIC SHEET    Who is requesting this form or medical record from you: [facility]     How would you like to receive the form or medical records (pick-up, mail, fax): FAX  If fax, what is the fax number: [phone]    Timeframe paperwork needed: AS SOON AS POSSIBLE     Additional notes: THIS IS FOR THE ORDER THAT THEY RECEIVED FOR A WHEEL CHAIR

## 2025-06-09 ENCOUNTER — TELEPHONE (OUTPATIENT)
Dept: FAMILY MEDICINE CLINIC | Facility: CLINIC | Age: 55
End: 2025-06-09

## 2025-06-09 NOTE — TELEPHONE ENCOUNTER
Blanche camarenag:      i have not heard from the c.t people for an appointment our the [employer] [facility] wont be back until the 17th need to know why i am swelling up and hardening cant walk very week dizzy out of breath

## 2025-06-10 NOTE — TELEPHONE ENCOUNTER
PATIENT IS OUT OF TOWN AND UNABLE TO COME INTO OFFICE UNTIL THE 17TH, SCHEDULED HIM AND ADVISED IF HIS SYMPTOMS WORSEN TO SEEK MEDICAL ATTENTION AT ER WHERE HE IS LOCATED

## 2025-06-16 RX ORDER — CARVEDILOL 12.5 MG/1
12.5 TABLET ORAL 2 TIMES DAILY WITH MEALS
Qty: 180 TABLET | Refills: 3 | Status: SHIPPED | OUTPATIENT
Start: 2025-06-16

## 2025-06-17 ENCOUNTER — OFFICE VISIT (OUTPATIENT)
Dept: FAMILY MEDICINE CLINIC | Facility: CLINIC | Age: 55
End: 2025-06-17
Payer: MEDICARE

## 2025-06-17 VITALS
SYSTOLIC BLOOD PRESSURE: 104 MMHG | TEMPERATURE: 97.8 F | DIASTOLIC BLOOD PRESSURE: 72 MMHG | HEIGHT: 65 IN | RESPIRATION RATE: 20 BRPM | BODY MASS INDEX: 52.48 KG/M2 | HEART RATE: 94 BPM | WEIGHT: 315 LBS

## 2025-06-17 DIAGNOSIS — I50.22 CHRONIC HFREF (HEART FAILURE WITH REDUCED EJECTION FRACTION): ICD-10-CM

## 2025-06-17 DIAGNOSIS — I42.0 DILATED CARDIOMYOPATHY: ICD-10-CM

## 2025-06-17 DIAGNOSIS — R19.00 ABDOMINAL SWELLING: ICD-10-CM

## 2025-06-17 DIAGNOSIS — E66.813 CLASS 3 SEVERE OBESITY DUE TO EXCESS CALORIES WITH SERIOUS COMORBIDITY AND BODY MASS INDEX (BMI) GREATER THAN OR EQUAL TO 70 IN ADULT: ICD-10-CM

## 2025-06-17 DIAGNOSIS — E66.01 CLASS 3 SEVERE OBESITY DUE TO EXCESS CALORIES WITH SERIOUS COMORBIDITY AND BODY MASS INDEX (BMI) GREATER THAN OR EQUAL TO 70 IN ADULT: ICD-10-CM

## 2025-06-17 DIAGNOSIS — R60.1 ANASARCA: Primary | ICD-10-CM

## 2025-06-17 NOTE — PROGRESS NOTES
Subjective   Matt Bueno is a 54 y.o. male.     History of Present Illness     He continues to have swelling in his abdominal area  In his legs as well  He has NOT been using lasix    He is on entresto and coreg, last ProBNP was elevated at 2400 but consistent with past levels  US revealed mild ascites but his fluid build up is legs, abdomen, chest now    He has not been on trelegy as insurance does not cover medications for him,  Slight  cough and increase in WOB without acute SOA    The following portions of the patient's history were reviewed and updated as appropriate: allergies, current medications, past family history, past medical history, past social history, past surgical history, and problem list.    Review of Systems    Objective   Physical Exam  Vitals and nursing note reviewed.   Constitutional:       General: He is not in acute distress.     Appearance: Normal appearance. He is well-developed. He is obese.   Cardiovascular:      Rate and Rhythm: Normal rate and regular rhythm.      Heart sounds: Normal heart sounds.   Pulmonary:      Effort: Pulmonary effort is normal.      Breath sounds: Normal breath sounds.   Musculoskeletal:      Right lower leg: Edema (chronic edema with swollen legs B) present.      Left lower leg: Edema (chronic edema with swollen legs B) present.   Neurological:      Mental Status: He is alert and oriented to person, place, and time.   Psychiatric:         Mood and Affect: Mood normal.         Behavior: Behavior normal.         Thought Content: Thought content normal.         Judgment: Judgment normal.         Assessment & Plan   Diagnoses and all orders for this visit:    1. Anasarca (Primary)  -     Ambulatory Referral to Cardiology for Heart Failure  -     CT Abdomen Pelvis Without Contrast; Future  -     Adult Transesophageal Echo (LESLIE) W/ Cont if Necessary Per Protocol; Future    2. Class 3 severe obesity due to excess calories with serious comorbidity and body mass  index (BMI) greater than or equal to 70 in adult  -     Adult Transesophageal Echo (LESLIE) W/ Cont if Necessary Per Protocol; Future    3. Dilated cardiomyopathy  -     Ambulatory Referral to Cardiology for Heart Failure  -     Adult Transesophageal Echo (LESLIE) W/ Cont if Necessary Per Protocol; Future    4. Chronic HFrEF (heart failure with reduced ejection fraction)  -     Ambulatory Referral to Cardiology for Heart Failure  -     Adult Transesophageal Echo (LESLIE) W/ Cont if Necessary Per Protocol; Future    5. Abdominal swelling  -     CT Abdomen Pelvis Without Contrast; Future    He just has so much fluid build up that the tissue in his abdomen, legs, etc. The tissue feels more dense than just fat  He has not been taking lasix but needs to  Will check CT to evaluate for other pathology of the swelling in his abdomen  Will work on seeing cardiology again to help with fluid overload.  This could be more of an end stage disease related to morbid obesity, pickwickian syndrome, and heart failure.  Will check ECHO but will need to be transesophageal due to body habitus

## 2025-06-22 DIAGNOSIS — R60.0 BILATERAL LOWER EXTREMITY EDEMA: ICD-10-CM

## 2025-06-22 DIAGNOSIS — I42.0 DILATED CARDIOMYOPATHY: ICD-10-CM

## 2025-06-23 RX ORDER — WARFARIN SODIUM 5 MG/1
TABLET ORAL
Qty: 14 TABLET | Refills: 0 | Status: SHIPPED | OUTPATIENT
Start: 2025-06-23

## 2025-06-23 RX ORDER — FUROSEMIDE 40 MG/1
40 TABLET ORAL DAILY
Qty: 90 TABLET | Refills: 1 | Status: SHIPPED | OUTPATIENT
Start: 2025-06-23

## 2025-06-23 NOTE — TELEPHONE ENCOUNTER
PATIENT OVERDUE FOR INR CHECK, SENT IN 2 WEEK SUPPLY OF WARFARIN       Demetrice Weir, PharmD   6/23/2025  10:13 EDT

## 2025-06-30 ENCOUNTER — HOSPITAL ENCOUNTER (OUTPATIENT)
Dept: CT IMAGING | Facility: HOSPITAL | Age: 55
Discharge: HOME | End: 2025-06-30
Admitting: FAMILY MEDICINE
Payer: MEDICARE

## 2025-06-30 DIAGNOSIS — R19.00 ABDOMINAL SWELLING: ICD-10-CM

## 2025-06-30 DIAGNOSIS — R60.1 ANASARCA: ICD-10-CM

## 2025-06-30 PROCEDURE — 74176 CT ABD & PELVIS W/O CONTRAST: CPT

## 2025-07-01 PROBLEM — E66.9 ADIPOSITY: Status: RESOLVED | Noted: 2018-10-03 | Resolved: 2025-07-01

## 2025-07-01 PROBLEM — I50.22 CHRONIC SYSTOLIC CHF (CONGESTIVE HEART FAILURE): Status: RESOLVED | Noted: 2023-09-29 | Resolved: 2025-07-01

## 2025-07-01 PROBLEM — M54.50 LOW BACK PAIN: Status: RESOLVED | Noted: 2018-10-03 | Resolved: 2025-07-01

## 2025-07-01 PROBLEM — Z12.11 SCREEN FOR COLON CANCER: Status: RESOLVED | Noted: 2018-10-19 | Resolved: 2025-07-01

## 2025-07-08 RX ORDER — WARFARIN SODIUM 5 MG/1
TABLET ORAL
Qty: 14 TABLET | Refills: 0 | Status: SHIPPED | OUTPATIENT
Start: 2025-07-08

## 2025-07-08 RX ORDER — WARFARIN SODIUM 5 MG/1
TABLET ORAL
Qty: 14 TABLET | Refills: 0 | Status: SHIPPED | OUTPATIENT
Start: 2025-07-08 | End: 2025-07-08 | Stop reason: SDUPTHER

## 2025-07-08 NOTE — TELEPHONE ENCOUNTER
Refill request sent in for patient from [facility] in [place]. Patient has not checked INR since 4/30/25, no INR check prior since 10/25/24.    Have called in refill for warfarin 5 mg tablets, qty: 14 and no refills to [facility] in [place] with directions to check INR for any further refills. Electronic Rx unable to be sent for unclear reasons.    Meet Juarez, DarciD, BCPS  7/8/2025  16:26 EDT

## 2025-07-18 ENCOUNTER — TELEPHONE (OUTPATIENT)
Dept: FAMILY MEDICINE CLINIC | Facility: CLINIC | Age: 55
End: 2025-07-18
Payer: MEDICARE

## 2025-07-18 NOTE — TELEPHONE ENCOUNTER
PT & WIFE SAID THAT THEY HAVE NOT HEARD ANYTHING AFTER THE ECHO ABOUT AN EKG. SHE IS WONDERING WHAT THE NEXT STEPS ARE?     PT STATES DIFFICULTY BREATHING AND BEING ABLE TO MOVE AROUND.       [facility] CALLED ABOUT POWER WHEELCHAIR. THEY HAVE NOT BEEN ABLE TO GET A HOLD OF DAIN. I CALLED YONATAN'S NUMBER AND GAVE THEM THE NUMBER FOR CHARLEE. THEY ARE SUPPOSED TO CALL HIM BACK ABOUT THE WHEELCHAIR.     PLEASE GIVE THEM A CALL BACK ABOUT WHAT IS NEXT FOR THE HARDENING OF THE HEART.     CALLBACK #S: [phone] (DAIN)   [phone] (YONATAN)

## 2025-07-19 DIAGNOSIS — R60.0 BILATERAL LOWER EXTREMITY EDEMA: ICD-10-CM

## 2025-07-19 DIAGNOSIS — I42.0 DILATED CARDIOMYOPATHY: ICD-10-CM

## 2025-07-21 RX ORDER — FUROSEMIDE 40 MG/1
40 TABLET ORAL DAILY
Qty: 90 TABLET | Refills: 1 | Status: SHIPPED | OUTPATIENT
Start: 2025-07-21

## 2025-07-22 ENCOUNTER — TELEPHONE (OUTPATIENT)
Dept: FAMILY MEDICINE CLINIC | Facility: CLINIC | Age: 55
End: 2025-07-22
Payer: MEDICARE

## 2025-07-22 NOTE — TELEPHONE ENCOUNTER
Blanche camarenag:      my last visit with dr fierro he was setting me up with a test i think it was e k g but i have not heard from scheduling i still do not know what is wrong every day it gets a little worse

## 2025-07-28 NOTE — TELEPHONE ENCOUNTER
We are trying to set him up for LESLIE (transesophageal ECHO) as well as cardiology referral.  Orders still in place for LESLIE and cardiology appointment is 9/22/25

## 2025-07-31 ENCOUNTER — TELEPHONE (OUTPATIENT)
Dept: CARDIOLOGY | Facility: CLINIC | Age: 55
End: 2025-07-31
Payer: MEDICARE

## 2025-07-31 NOTE — TELEPHONE ENCOUNTER
Appointment w/ALFREDA Richardson, scheduled for Monday, 8/4/2025 @ 1 PM. I spoke w/pt who states he is aware of the time/date/location of the appointment.     LESLIE scheduling is underway as well.

## 2025-07-31 NOTE — TELEPHONE ENCOUNTER
Per chart review pt has been experiencing heart failure symptoms & seeking care from his PCP. LESLIE ordered 6/27/2025 & not scheduled yet. I've contacted the diagnostic  for Dr. Hernandez for assistance scheduling LESLIE. Hx of NICM & morbid obesity    Pt has a [employer] single chamber ICD, model D150, which does not provide heart failure diagnostics.     I spoke w/pt who reports swelling to his feet/legs/stomach/chest. Pt states he can only walk 10 feet before he has to stop secondary to shortness of breath. Pt reports compliance taking lasix daily. Pt wears C-PAP at night & denies orthopnea/PND.     Appointment with Dr. Hernandez on 9/22/2025.   _____________________________________    Dr. Hernandez, would you like pt to be seen & treated by the Heart & Valve department? Pt is amenable to this.

## 2025-08-01 LAB
MC_CV_MDC_IDC_RATE_1: 170
MC_CV_MDC_IDC_RATE_1: 200
MC_CV_MDC_IDC_RATE_1: 250
MC_CV_MDC_IDC_SHOCK_MEASURED_IMPEDANCE: 40
MC_CV_MDC_IDC_THERAPIES: NORMAL
MC_CV_MDC_IDC_THERAPIES: NORMAL
MC_CV_MDC_IDC_ZONE_ID: 1
MC_CV_MDC_IDC_ZONE_ID: 2
MC_CV_MDC_IDC_ZONE_ID: 3
MDC_IDC_MSMT_BATTERY_REMAINING_LONGEVITY: 120 MO
MDC_IDC_MSMT_BATTERY_REMAINING_PERCENTAGE: 100 %
MDC_IDC_MSMT_BATTERY_STATUS: NORMAL
MDC_IDC_MSMT_CAP_CHARGE_TIME: 10.2
MDC_IDC_MSMT_LEADCHNL_RV_IMPEDANCE_VALUE: 372
MDC_IDC_MSMT_LEADCHNL_RV_PACING_THRESHOLD_POLARITY: NORMAL
MDC_IDC_MSMT_LEADCHNL_RV_SENSING_INTR_AMPL: 15.5
MDC_IDC_PG_IMPLANT_DTM: NORMAL
MDC_IDC_PG_MFG: NORMAL
MDC_IDC_PG_MODEL: NORMAL
MDC_IDC_PG_SERIAL: NORMAL
MDC_IDC_PG_TYPE: NORMAL
MDC_IDC_SESS_DTM: NORMAL
MDC_IDC_SESS_TYPE: NORMAL
MDC_IDC_SET_BRADY_LOWRATE: 40
MDC_IDC_SET_BRADY_MODE: NORMAL
MDC_IDC_SET_LEADCHNL_RV_PACING_AMPLITUDE: 2
MDC_IDC_SET_LEADCHNL_RV_PACING_POLARITY: NORMAL
MDC_IDC_SET_LEADCHNL_RV_PACING_PULSEWIDTH: 0.5
MDC_IDC_SET_LEADCHNL_RV_SENSING_POLARITY: NORMAL
MDC_IDC_SET_LEADCHNL_RV_SENSING_SENSITIVITY: 0.6
MDC_IDC_SET_ZONE_STATUS: NORMAL
MDC_IDC_SET_ZONE_TYPE: NORMAL
MDC_IDC_STAT_BRADY_RV_PERCENT_PACED: 0
MDC_IDC_STAT_TACHYTHERAPY_ATP_DELIVERED_RECENT: 0
MDC_IDC_STAT_TACHYTHERAPY_SHOCKS_ABORTED_RECENT: 0
MDC_IDC_STAT_TACHYTHERAPY_SHOCKS_DELIVERED_RECENT: 0

## 2025-08-04 ENCOUNTER — PATIENT ROUNDING (BHMG ONLY) (OUTPATIENT)
Dept: CARDIOLOGY | Facility: HOSPITAL | Age: 55
End: 2025-08-04
Payer: MEDICARE

## 2025-08-04 ENCOUNTER — OFFICE VISIT (OUTPATIENT)
Dept: CARDIOLOGY | Facility: HOSPITAL | Age: 55
End: 2025-08-04
Payer: MEDICARE

## 2025-08-04 VITALS
RESPIRATION RATE: 18 BRPM | SYSTOLIC BLOOD PRESSURE: 115 MMHG | HEIGHT: 65 IN | DIASTOLIC BLOOD PRESSURE: 60 MMHG | WEIGHT: 315 LBS | HEART RATE: 86 BPM | BODY MASS INDEX: 52.48 KG/M2 | OXYGEN SATURATION: 95 %

## 2025-08-04 DIAGNOSIS — I50.23 ACUTE ON CHRONIC HFREF (HEART FAILURE WITH REDUCED EJECTION FRACTION): Primary | ICD-10-CM

## 2025-08-04 DIAGNOSIS — I10 ESSENTIAL HYPERTENSION: ICD-10-CM

## 2025-08-04 DIAGNOSIS — I48.21 PERMANENT ATRIAL FIBRILLATION: ICD-10-CM

## 2025-08-04 LAB
ALBUMIN SERPL-MCNC: 3.6 G/DL (ref 3.5–5.2)
ALBUMIN/GLOB SERPL: 1.1 G/DL
ALP SERPL-CCNC: 138 U/L (ref 39–117)
ALT SERPL W P-5'-P-CCNC: 6 U/L (ref 1–41)
ANION GAP SERPL CALCULATED.3IONS-SCNC: 10 MMOL/L (ref 5–15)
AST SERPL-CCNC: 15 U/L (ref 1–40)
BASOPHILS # BLD AUTO: 0.07 10*3/MM3 (ref 0–0.2)
BASOPHILS NFR BLD AUTO: 1 % (ref 0–1.5)
BILIRUB SERPL-MCNC: 1 MG/DL (ref 0–1.2)
BUN SERPL-MCNC: 15 MG/DL (ref 6–20)
BUN/CREAT SERPL: 19.5 (ref 7–25)
CALCIUM SPEC-SCNC: 8.7 MG/DL (ref 8.6–10.5)
CHLORIDE SERPL-SCNC: 99 MMOL/L (ref 98–107)
CO2 SERPL-SCNC: 28 MMOL/L (ref 22–29)
CREAT SERPL-MCNC: 0.77 MG/DL (ref 0.76–1.27)
DEPRECATED RDW RBC AUTO: 47 FL (ref 37–54)
EGFRCR SERPLBLD CKD-EPI 2021: 105.1 ML/MIN/1.73
EOSINOPHIL # BLD AUTO: 0.3 10*3/MM3 (ref 0–0.4)
EOSINOPHIL NFR BLD AUTO: 4.1 % (ref 0.3–6.2)
ERYTHROCYTE [DISTWIDTH] IN BLOOD BY AUTOMATED COUNT: 16.5 % (ref 12.3–15.4)
GLOBULIN UR ELPH-MCNC: 3.4 GM/DL
GLUCOSE SERPL-MCNC: 118 MG/DL (ref 65–99)
HCT VFR BLD AUTO: 33.3 % (ref 37.5–51)
HGB BLD-MCNC: 9.6 G/DL (ref 13–17.7)
IMM GRANULOCYTES # BLD AUTO: 0.02 10*3/MM3 (ref 0–0.05)
IMM GRANULOCYTES NFR BLD AUTO: 0.3 % (ref 0–0.5)
IRON 24H UR-MRATE: 25 MCG/DL (ref 59–158)
IRON SATN MFR SERPL: 6 % (ref 20–50)
LYMPHOCYTES # BLD AUTO: 0.97 10*3/MM3 (ref 0.7–3.1)
LYMPHOCYTES NFR BLD AUTO: 13.4 % (ref 19.6–45.3)
MAGNESIUM SERPL-MCNC: 2.3 MG/DL (ref 1.6–2.6)
MCH RBC QN AUTO: 22.9 PG (ref 26.6–33)
MCHC RBC AUTO-ENTMCNC: 28.8 G/DL (ref 31.5–35.7)
MCV RBC AUTO: 79.3 FL (ref 79–97)
MONOCYTES # BLD AUTO: 0.93 10*3/MM3 (ref 0.1–0.9)
MONOCYTES NFR BLD AUTO: 12.8 % (ref 5–12)
NEUTROPHILS NFR BLD AUTO: 4.95 10*3/MM3 (ref 1.7–7)
NEUTROPHILS NFR BLD AUTO: 68.4 % (ref 42.7–76)
NRBC BLD AUTO-RTO: 0 /100 WBC (ref 0–0.2)
NT-PROBNP SERPL-MCNC: 1973 PG/ML (ref 0–900)
PLATELET # BLD AUTO: 305 10*3/MM3 (ref 140–450)
PMV BLD AUTO: 10.2 FL (ref 6–12)
POTASSIUM SERPL-SCNC: 4.2 MMOL/L (ref 3.5–5.2)
PROT SERPL-MCNC: 7 G/DL (ref 6–8.5)
RBC # BLD AUTO: 4.2 10*6/MM3 (ref 4.14–5.8)
SODIUM SERPL-SCNC: 137 MMOL/L (ref 136–145)
TIBC SERPL-MCNC: 435 MCG/DL (ref 298–536)
TRANSFERRIN SERPL-MCNC: 292 MG/DL (ref 200–360)
WBC NRBC COR # BLD AUTO: 7.24 10*3/MM3 (ref 3.4–10.8)

## 2025-08-04 PROCEDURE — 3074F SYST BP LT 130 MM HG: CPT | Performed by: NURSE PRACTITIONER

## 2025-08-04 PROCEDURE — 85025 COMPLETE CBC W/AUTO DIFF WBC: CPT | Performed by: NURSE PRACTITIONER

## 2025-08-04 PROCEDURE — 1160F RVW MEDS BY RX/DR IN RCRD: CPT | Performed by: NURSE PRACTITIONER

## 2025-08-04 PROCEDURE — 83540 ASSAY OF IRON: CPT | Performed by: NURSE PRACTITIONER

## 2025-08-04 PROCEDURE — 99214 OFFICE O/P EST MOD 30 MIN: CPT | Performed by: NURSE PRACTITIONER

## 2025-08-04 PROCEDURE — 83735 ASSAY OF MAGNESIUM: CPT | Performed by: NURSE PRACTITIONER

## 2025-08-04 PROCEDURE — 80053 COMPREHEN METABOLIC PANEL: CPT | Performed by: NURSE PRACTITIONER

## 2025-08-04 PROCEDURE — 1159F MED LIST DOCD IN RCRD: CPT | Performed by: NURSE PRACTITIONER

## 2025-08-04 PROCEDURE — 3078F DIAST BP <80 MM HG: CPT | Performed by: NURSE PRACTITIONER

## 2025-08-04 PROCEDURE — G2211 COMPLEX E/M VISIT ADD ON: HCPCS | Performed by: NURSE PRACTITIONER

## 2025-08-04 PROCEDURE — 83880 ASSAY OF NATRIURETIC PEPTIDE: CPT | Performed by: NURSE PRACTITIONER

## 2025-08-04 PROCEDURE — 84466 ASSAY OF TRANSFERRIN: CPT | Performed by: NURSE PRACTITIONER

## 2025-08-04 RX ORDER — WARFARIN SODIUM 5 MG/1
TABLET ORAL
Qty: 14 TABLET | Refills: 0 | Status: SHIPPED | OUTPATIENT
Start: 2025-08-04

## 2025-08-04 RX ORDER — WARFARIN SODIUM 5 MG/1
TABLET ORAL
Qty: 14 TABLET | Refills: 0 | Status: SHIPPED | OUTPATIENT
Start: 2025-08-04 | End: 2025-08-04

## 2025-08-04 RX ORDER — EPLERENONE 50 MG/1
50 TABLET ORAL DAILY
Qty: 30 TABLET | Refills: 3 | Status: SHIPPED | OUTPATIENT
Start: 2025-08-04

## 2025-08-04 RX ORDER — FUROSEMIDE 10 MG/ML
80 INJECTION INTRAMUSCULAR; INTRAVENOUS ONCE
Status: DISCONTINUED | OUTPATIENT
Start: 2025-08-04 | End: 2025-08-11

## 2025-08-05 ENCOUNTER — RESULTS FOLLOW-UP (OUTPATIENT)
Dept: CARDIOLOGY | Facility: HOSPITAL | Age: 55
End: 2025-08-05
Payer: MEDICARE

## 2025-08-05 PROBLEM — D50.9 IRON DEFICIENCY ANEMIA: Status: ACTIVE | Noted: 2025-08-05

## 2025-08-05 RX ORDER — FAMOTIDINE 10 MG/ML
20 INJECTION, SOLUTION INTRAVENOUS AS NEEDED
OUTPATIENT
Start: 2025-08-05

## 2025-08-05 RX ORDER — HYDROCORTISONE SODIUM SUCCINATE 100 MG/2ML
100 INJECTION INTRAMUSCULAR; INTRAVENOUS AS NEEDED
OUTPATIENT
Start: 2025-08-05

## 2025-08-05 RX ORDER — SODIUM CHLORIDE 9 MG/ML
20 INJECTION, SOLUTION INTRAVENOUS ONCE
OUTPATIENT
Start: 2025-08-05

## 2025-08-05 RX ORDER — DIPHENHYDRAMINE HYDROCHLORIDE 50 MG/ML
50 INJECTION, SOLUTION INTRAMUSCULAR; INTRAVENOUS AS NEEDED
OUTPATIENT
Start: 2025-08-05

## 2025-08-06 ENCOUNTER — TELEPHONE (OUTPATIENT)
Dept: CARDIOLOGY | Facility: HOSPITAL | Age: 55
End: 2025-08-06
Payer: MEDICARE

## 2025-08-07 ENCOUNTER — TELEPHONE (OUTPATIENT)
Dept: CARDIOLOGY | Facility: CLINIC | Age: 55
End: 2025-08-07
Payer: MEDICARE

## 2025-08-11 ENCOUNTER — OFFICE VISIT (OUTPATIENT)
Dept: CARDIOLOGY | Facility: HOSPITAL | Age: 55
End: 2025-08-11
Payer: MEDICARE

## 2025-08-11 ENCOUNTER — RESULTS FOLLOW-UP (OUTPATIENT)
Dept: CARDIOLOGY | Facility: HOSPITAL | Age: 55
End: 2025-08-11

## 2025-08-11 VITALS
HEART RATE: 95 BPM | HEIGHT: 65 IN | DIASTOLIC BLOOD PRESSURE: 65 MMHG | SYSTOLIC BLOOD PRESSURE: 125 MMHG | OXYGEN SATURATION: 95 % | WEIGHT: 315 LBS | BODY MASS INDEX: 52.48 KG/M2

## 2025-08-11 DIAGNOSIS — I50.23 ACUTE ON CHRONIC HFREF (HEART FAILURE WITH REDUCED EJECTION FRACTION): Primary | ICD-10-CM

## 2025-08-11 DIAGNOSIS — R60.0 BILATERAL LOWER EXTREMITY EDEMA: ICD-10-CM

## 2025-08-11 DIAGNOSIS — I42.0 DILATED CARDIOMYOPATHY: ICD-10-CM

## 2025-08-11 LAB
ANION GAP SERPL CALCULATED.3IONS-SCNC: 10 MMOL/L (ref 5–15)
BUN SERPL-MCNC: 22 MG/DL (ref 6–20)
BUN/CREAT SERPL: 22.4 (ref 7–25)
CALCIUM SPEC-SCNC: 8.7 MG/DL (ref 8.6–10.5)
CHLORIDE SERPL-SCNC: 101 MMOL/L (ref 98–107)
CO2 SERPL-SCNC: 25 MMOL/L (ref 22–29)
CREAT SERPL-MCNC: 0.98 MG/DL (ref 0.76–1.27)
EGFRCR SERPLBLD CKD-EPI 2021: 90.5 ML/MIN/1.73
GLUCOSE SERPL-MCNC: 113 MG/DL (ref 65–99)
NT-PROBNP SERPL-MCNC: 4123 PG/ML (ref 0–900)
POTASSIUM SERPL-SCNC: 4.2 MMOL/L (ref 3.5–5.2)
SODIUM SERPL-SCNC: 136 MMOL/L (ref 136–145)

## 2025-08-11 PROCEDURE — 80048 BASIC METABOLIC PNL TOTAL CA: CPT | Performed by: NURSE PRACTITIONER

## 2025-08-11 PROCEDURE — 83880 ASSAY OF NATRIURETIC PEPTIDE: CPT | Performed by: NURSE PRACTITIONER

## 2025-08-11 RX ORDER — FUROSEMIDE 40 MG/1
40 TABLET ORAL 2 TIMES DAILY
Start: 2025-08-11

## 2025-08-11 RX ORDER — FUROSEMIDE 10 MG/ML
80 INJECTION INTRAMUSCULAR; INTRAVENOUS ONCE
Status: SHIPPED | OUTPATIENT
Start: 2025-08-11

## 2025-08-12 ENCOUNTER — TELEPHONE (OUTPATIENT)
Dept: CARDIOLOGY | Facility: HOSPITAL | Age: 55
End: 2025-08-12
Payer: MEDICARE

## (undated) DEVICE — DRSNG SURG AQUACEL AG 9X15CM

## (undated) DEVICE — CATH DIAG EXPO .056 FL3.5 6F 100CM

## (undated) DEVICE — Device

## (undated) DEVICE — MODEL BT2000 P/N 700287-012KIT CONTENTS: MANIFOLD WITH SALINE AND CONTRAST PORTS, SALINE TUBING WITH SPIKE AND HAND SYRINGE, TRANSDUCER: Brand: BT2000 AUTOMATED MANIFOLD KIT

## (undated) DEVICE — DECANT BG O JET

## (undated) DEVICE — TUBING, SUCTION, 1/4" X 10', STRAIGHT: Brand: MEDLINE

## (undated) DEVICE — PK EP 10

## (undated) DEVICE — SOL NACL 0.9PCT 1000ML

## (undated) DEVICE — IRRIGATOR BULB ASEPTO 60CC STRL

## (undated) DEVICE — MAGNETIC DRAPE: Brand: DEVON

## (undated) DEVICE — GLIDESHEATH BASIC HYDROPHILIC COATED INTRODUCER SHEATH: Brand: GLIDESHEATH

## (undated) DEVICE — MEDI-VAC YANKAUER SUCTION HANDLE W/BULBOUS TIP: Brand: CARDINAL HEALTH

## (undated) DEVICE — ST INF PRI SMRTSTE 20DRP 2VLV 24ML 117

## (undated) DEVICE — CATH DIAG EXPO M/ PK 6FR FL4/FR4 PIG 3PK

## (undated) DEVICE — PK CATH CARD 10

## (undated) DEVICE — INTRO SHEATH ENGAGE W/50 GW .038 8F12

## (undated) DEVICE — SET PRIMARY GRVTY 10DP MALE LL 104IN

## (undated) DEVICE — DEV COMP RAD PRELUDESYNC 29CM

## (undated) DEVICE — DECANTER: Brand: UNBRANDED

## (undated) DEVICE — LEX ELECTRO PHYSIOLOGY: Brand: MEDLINE INDUSTRIES, INC.

## (undated) DEVICE — PENCL E/S HNDSWCH ROCKRBTN HOLSTR 10FT

## (undated) DEVICE — CANN NASL CO2 DIVIDED A/

## (undated) DEVICE — SOL NS 500ML

## (undated) DEVICE — ADULT, W/LG. BACK PAD, RADIOTRANSPARENT ELEMENT AND LEAD WIRE: Brand: DEFIBRILLATION ELECTRODES

## (undated) DEVICE — CAUTERY TIP POLISHER: Brand: DEVON

## (undated) DEVICE — ST EXT IV SMARTSITE 2VLV SP M LL 5ML IV1

## (undated) DEVICE — INTRO TEAR AWAY/LVD W/SD PRT 8F 13CM

## (undated) DEVICE — PROXIMATE RH ROTATING HEAD SKIN STAPLERS (35 WIDE) CONTAINS 35 STAINLESS STEEL STAPLES: Brand: PROXIMATE

## (undated) DEVICE — MODEL AT P65, P/N 701554-001KIT CONTENTS: HAND CONTROLLER, 3-WAY HIGH-PRESSURE STOPCOCK WITH ROTATING END AND PREMIUM HIGH-PRESSURE TUBING: Brand: ANGIOTOUCH® KIT

## (undated) DEVICE — LIMB HOLDER, WRIST/ANKLE: Brand: DEROYAL

## (undated) DEVICE — LN SMPL 02 SMRT/CAPNOLINE PLS A/INTERMD